# Patient Record
Sex: MALE | Race: WHITE | Employment: OTHER | URBAN - METROPOLITAN AREA
[De-identification: names, ages, dates, MRNs, and addresses within clinical notes are randomized per-mention and may not be internally consistent; named-entity substitution may affect disease eponyms.]

---

## 2017-05-12 ENCOUNTER — HOSPITAL ENCOUNTER (OUTPATIENT)
Age: 72
Setting detail: OUTPATIENT SURGERY
Discharge: HOME | End: 2017-05-12
Attending: INTERNAL MEDICINE | Admitting: INTERNAL MEDICINE
Payer: MEDICARE

## 2017-05-12 ENCOUNTER — ANESTHESIA EVENT (OUTPATIENT)
Dept: ENDOSCOPY | Age: 72
End: 2017-05-12
Payer: MEDICARE

## 2017-05-12 ENCOUNTER — ANESTHESIA (OUTPATIENT)
Dept: ENDOSCOPY | Age: 72
End: 2017-05-12
Payer: MEDICARE

## 2017-05-12 ENCOUNTER — OFFICE VISIT (OUTPATIENT)
Dept: GASTROENTEROLOGY | Age: 72
End: 2017-05-12

## 2017-05-12 VITALS
BODY MASS INDEX: 26.05 KG/M2 | RESPIRATION RATE: 16 BRPM | SYSTOLIC BLOOD PRESSURE: 119 MMHG | WEIGHT: 182 LBS | DIASTOLIC BLOOD PRESSURE: 59 MMHG | TEMPERATURE: 97.9 F | OXYGEN SATURATION: 95 % | HEIGHT: 70 IN | HEART RATE: 79 BPM

## 2017-05-12 VITALS
SYSTOLIC BLOOD PRESSURE: 147 MMHG | OXYGEN SATURATION: 85 % | DIASTOLIC BLOOD PRESSURE: 74 MMHG | RESPIRATION RATE: 27 BRPM

## 2017-05-12 DIAGNOSIS — Z79.899 ENCOUNTER FOR LONG-TERM (CURRENT) USE OF MEDICATIONS: ICD-10-CM

## 2017-05-12 DIAGNOSIS — Z86.010 PERSONAL HISTORY OF COLONIC POLYPS: Primary | ICD-10-CM

## 2017-05-12 DIAGNOSIS — Z79.82 ENCOUNTER FOR LONG-TERM (CURRENT) USE OF ASPIRIN: ICD-10-CM

## 2017-05-12 PROCEDURE — 1036F TOBACCO NON-USER: CPT | Performed by: INTERNAL MEDICINE

## 2017-05-12 PROCEDURE — PREOPEXAM PRE-OP EXAM: Performed by: INTERNAL MEDICINE

## 2017-05-12 PROCEDURE — 1123F ACP DISCUSS/DSCN MKR DOCD: CPT | Performed by: INTERNAL MEDICINE

## 2017-05-12 PROCEDURE — G8420 CALC BMI NORM PARAMETERS: HCPCS | Performed by: INTERNAL MEDICINE

## 2017-05-12 PROCEDURE — 2580000003 HC RX 258: Performed by: STUDENT IN AN ORGANIZED HEALTH CARE EDUCATION/TRAINING PROGRAM

## 2017-05-12 PROCEDURE — 3017F COLORECTAL CA SCREEN DOC REV: CPT | Performed by: INTERNAL MEDICINE

## 2017-05-12 PROCEDURE — 6360000002 HC RX W HCPCS: Performed by: NURSE ANESTHETIST, CERTIFIED REGISTERED

## 2017-05-12 PROCEDURE — 3700000001 HC ADD 15 MINUTES (ANESTHESIA): Performed by: INTERNAL MEDICINE

## 2017-05-12 PROCEDURE — G8428 CUR MEDS NOT DOCUMENT: HCPCS | Performed by: INTERNAL MEDICINE

## 2017-05-12 PROCEDURE — 3609027000 HC COLONOSCOPY: Performed by: INTERNAL MEDICINE

## 2017-05-12 PROCEDURE — 3700000000 HC ANESTHESIA ATTENDED CARE: Performed by: INTERNAL MEDICINE

## 2017-05-12 PROCEDURE — G8598 ASA/ANTIPLAT THER USED: HCPCS | Performed by: INTERNAL MEDICINE

## 2017-05-12 PROCEDURE — 88305 TISSUE EXAM BY PATHOLOGIST: CPT

## 2017-05-12 PROCEDURE — 2500000003 HC RX 250 WO HCPCS: Performed by: NURSE ANESTHETIST, CERTIFIED REGISTERED

## 2017-05-12 PROCEDURE — 4040F PNEUMOC VAC/ADMIN/RCVD: CPT | Performed by: INTERNAL MEDICINE

## 2017-05-12 PROCEDURE — 7100000010 HC PHASE II RECOVERY - FIRST 15 MIN: Performed by: INTERNAL MEDICINE

## 2017-05-12 RX ORDER — PROPOFOL 10 MG/ML
INJECTION, EMULSION INTRAVENOUS PRN
Status: DISCONTINUED | OUTPATIENT
Start: 2017-05-12 | End: 2017-05-12 | Stop reason: SDUPTHER

## 2017-05-12 RX ORDER — SODIUM CHLORIDE 9 MG/ML
INJECTION, SOLUTION INTRAVENOUS CONTINUOUS
Status: DISCONTINUED | OUTPATIENT
Start: 2017-05-12 | End: 2017-05-12 | Stop reason: HOSPADM

## 2017-05-12 RX ORDER — ONDANSETRON 2 MG/ML
4 INJECTION INTRAMUSCULAR; INTRAVENOUS
Status: DISCONTINUED | OUTPATIENT
Start: 2017-05-12 | End: 2017-05-12 | Stop reason: HOSPADM

## 2017-05-12 RX ORDER — LIDOCAINE HYDROCHLORIDE 20 MG/ML
INJECTION, SOLUTION EPIDURAL; INFILTRATION; INTRACAUDAL; PERINEURAL PRN
Status: DISCONTINUED | OUTPATIENT
Start: 2017-05-12 | End: 2017-05-12 | Stop reason: SDUPTHER

## 2017-05-12 RX ADMIN — SODIUM CHLORIDE: 9 INJECTION, SOLUTION INTRAVENOUS at 09:20

## 2017-05-12 RX ADMIN — PROPOFOL 20 MG: 10 INJECTION, EMULSION INTRAVENOUS at 09:44

## 2017-05-12 RX ADMIN — PROPOFOL 40 MG: 10 INJECTION, EMULSION INTRAVENOUS at 09:41

## 2017-05-12 RX ADMIN — PROPOFOL 20 MG: 10 INJECTION, EMULSION INTRAVENOUS at 09:50

## 2017-05-12 RX ADMIN — LIDOCAINE HYDROCHLORIDE 20 MG: 20 INJECTION, SOLUTION EPIDURAL; INFILTRATION; INTRACAUDAL; PERINEURAL at 09:39

## 2017-05-12 RX ADMIN — PROPOFOL 20 MG: 10 INJECTION, EMULSION INTRAVENOUS at 09:48

## 2017-05-12 RX ADMIN — PROPOFOL 20 MG: 10 INJECTION, EMULSION INTRAVENOUS at 09:43

## 2017-05-12 RX ADMIN — PROPOFOL 20 MG: 10 INJECTION, EMULSION INTRAVENOUS at 09:46

## 2017-05-12 RX ADMIN — PROPOFOL 80 MG: 10 INJECTION, EMULSION INTRAVENOUS at 09:39

## 2017-05-12 ASSESSMENT — PAIN - FUNCTIONAL ASSESSMENT: PAIN_FUNCTIONAL_ASSESSMENT: 0-10

## 2017-05-16 VITALS
WEIGHT: 182 LBS | SYSTOLIC BLOOD PRESSURE: 150 MMHG | HEART RATE: 78 BPM | BODY MASS INDEX: 26.49 KG/M2 | DIASTOLIC BLOOD PRESSURE: 85 MMHG

## 2017-09-27 ENCOUNTER — OFFICE VISIT (OUTPATIENT)
Dept: FAMILY MEDICINE CLINIC | Age: 72
End: 2017-09-27

## 2017-09-27 VITALS
RESPIRATION RATE: 16 BRPM | HEIGHT: 69 IN | SYSTOLIC BLOOD PRESSURE: 136 MMHG | HEART RATE: 79 BPM | DIASTOLIC BLOOD PRESSURE: 84 MMHG | WEIGHT: 189.5 LBS | OXYGEN SATURATION: 98 % | BODY MASS INDEX: 28.07 KG/M2 | TEMPERATURE: 98.2 F

## 2017-09-27 DIAGNOSIS — I10 ESSENTIAL HYPERTENSION: ICD-10-CM

## 2017-09-27 DIAGNOSIS — E78.2 MIXED HYPERLIPIDEMIA: Primary | ICD-10-CM

## 2017-09-27 PROCEDURE — G8427 DOCREV CUR MEDS BY ELIG CLIN: HCPCS | Performed by: FAMILY MEDICINE

## 2017-09-27 PROCEDURE — 4040F PNEUMOC VAC/ADMIN/RCVD: CPT | Performed by: FAMILY MEDICINE

## 2017-09-27 PROCEDURE — 1036F TOBACCO NON-USER: CPT | Performed by: FAMILY MEDICINE

## 2017-09-27 PROCEDURE — 1123F ACP DISCUSS/DSCN MKR DOCD: CPT | Performed by: FAMILY MEDICINE

## 2017-09-27 PROCEDURE — G8419 CALC BMI OUT NRM PARAM NOF/U: HCPCS | Performed by: FAMILY MEDICINE

## 2017-09-27 PROCEDURE — 3017F COLORECTAL CA SCREEN DOC REV: CPT | Performed by: FAMILY MEDICINE

## 2017-09-27 PROCEDURE — 99213 OFFICE O/P EST LOW 20 MIN: CPT | Performed by: FAMILY MEDICINE

## 2017-09-27 PROCEDURE — G8598 ASA/ANTIPLAT THER USED: HCPCS | Performed by: FAMILY MEDICINE

## 2017-09-27 ASSESSMENT — ENCOUNTER SYMPTOMS: SHORTNESS OF BREATH: 0

## 2018-08-29 ENCOUNTER — TELEPHONE (OUTPATIENT)
Dept: ENDOCRINOLOGY | Age: 73
End: 2018-08-29

## 2018-09-07 ENCOUNTER — OFFICE VISIT (OUTPATIENT)
Dept: FAMILY MEDICINE CLINIC | Age: 73
End: 2018-09-07
Payer: MEDICARE

## 2018-09-07 VITALS
HEART RATE: 76 BPM | SYSTOLIC BLOOD PRESSURE: 134 MMHG | OXYGEN SATURATION: 98 % | HEIGHT: 69 IN | TEMPERATURE: 97.2 F | WEIGHT: 200.7 LBS | RESPIRATION RATE: 14 BRPM | BODY MASS INDEX: 29.73 KG/M2 | DIASTOLIC BLOOD PRESSURE: 88 MMHG

## 2018-09-07 DIAGNOSIS — Z00.00 ROUTINE GENERAL MEDICAL EXAMINATION AT A HEALTH CARE FACILITY: Primary | ICD-10-CM

## 2018-09-07 DIAGNOSIS — N52.8 OTHER MALE ERECTILE DYSFUNCTION: ICD-10-CM

## 2018-09-07 DIAGNOSIS — I10 ESSENTIAL HYPERTENSION: ICD-10-CM

## 2018-09-07 DIAGNOSIS — E78.2 MIXED HYPERLIPIDEMIA: ICD-10-CM

## 2018-09-07 PROCEDURE — G8598 ASA/ANTIPLAT THER USED: HCPCS | Performed by: FAMILY MEDICINE

## 2018-09-07 PROCEDURE — 4040F PNEUMOC VAC/ADMIN/RCVD: CPT | Performed by: FAMILY MEDICINE

## 2018-09-07 PROCEDURE — G0438 PPPS, INITIAL VISIT: HCPCS | Performed by: FAMILY MEDICINE

## 2018-09-07 ASSESSMENT — ANXIETY QUESTIONNAIRES: GAD7 TOTAL SCORE: 0

## 2018-09-07 ASSESSMENT — PATIENT HEALTH QUESTIONNAIRE - PHQ9
SUM OF ALL RESPONSES TO PHQ QUESTIONS 1-9: 0
SUM OF ALL RESPONSES TO PHQ QUESTIONS 1-9: 0

## 2018-09-07 ASSESSMENT — LIFESTYLE VARIABLES: HOW OFTEN DO YOU HAVE A DRINK CONTAINING ALCOHOL: 0

## 2018-09-07 NOTE — PROGRESS NOTES
Medicare Annual Wellness Visit  Name: Vilma Landa Date: 2018   MRN: [id number] Sex: Male   Age: 67 y.o. Ethnicity: Non-/Non    : 1945 Race: White      Albert Carr is here for Annual Exam (Medicare Annual Well Visit ) and Discuss Medications (pt is on Diovan HCT through his heart doctor )    Screenings for behavioral, psychosocial and functional/safety risks, and cognitive dysfunction are all negative except as indicated below. These results, as well as other patient data from the [address] form, are documented in Flowsheets linked to this Encounter. No Known Allergies  Prior to Visit Medications    Medication Sig Taking? Authorizing Provider   sildenafil (VIAGRA) 100 MG tablet Take 1 tablet by mouth as needed for Erectile Dysfunction Yes Sathish Hays MD   valsartan-hydrochlorothiazide (DIOVAN-HCT) 320-25 MG per tablet Take 1 tab po QD Yes Historical Provider, MD   atorvastatin (LIPITOR) 40 MG tablet Take 40 mg by mouth daily. Yes Historical Provider, MD   metoprolol (LOPRESSOR) 25 MG tablet Take 25 mg by mouth 2 times daily. Yes Historical Provider, MD   NITROSTAT 0.4 MG SL tablet Place 0.4 mg under the tongue every 5 minutes as needed. Yes Historical Provider, MD   aspirin 81 MG tablet Take 81 mg by mouth daily.  Yes Historical Provider, MD     Past Medical History:   Diagnosis Date    BPH (benign prostatic hypertrophy)     Erectile dysfunction     Heart palpitations     History of heart attack     Hx of blood clots     Hyperlipidemia     Hypertension     PSA elevation     TOTAL-6.6, FREE 1.9, PERCENT FREE 29     Past Surgical History:   Procedure Laterality Date    CARDIAC CATHETERIZATION  2013    stents x2    COLONOSCOPY  , RECTAL BLEED    HIATAL HERNIA,ESOPHAGEAL RING,GASTRITIS    COLONOSCOPY      NORMAL    [place] COLON CA SCRN NOT  [address] IND N/A 2017    COL[facility] at [facility]    TOOTH EXTRACTION      TRANSTHORACIC ECHOCARDIOGRAM  6/7/2013    UPPER GASTROINTESTINAL ENDOSCOPY  2001    hiatal hernia     Family History   Problem Relation Age of Onset    Heart Disease Mother     Heart Disease Sister        CareTeam (Including outside providers/suppliers regularly involved in providing care):   Patient Care Team:  Lindsey Coughlin MD as PCP - General (Family Medicine)  Lindsey Coughlin MD as PCP - [facility] Attributed Provider    Wt Readings from Last 3 Encounters:   09/07/18 200 lb 11.2 oz (91 kg)   09/27/17 189 lb 8 oz (86 kg)   05/12/17 182 lb (82.6 kg)     Vitals:    09/07/18 1120   BP: 134/88   Pulse: 76   Resp: 14   Temp: 97.2 °F (36.2 °C)   SpO2: 98%   Weight: 200 lb 11.2 oz (91 kg)   Height: 5' 9\" (1.753 m)     Body mass index is 29.64 kg/m². General Appearance: well-developed and well nourished, in no acute distress and alert  Skin: warm and dry, no rash or erythema  Head: normocephalic and atraumatic  Eyes: pupils equal, round, and reactive to light, extraocular eye movements intact, conjunctivae normal  ENT: tympanic membrane, external ear and ear canal normal bilaterally, oropharynx clear and moist with normal mucous membranes  Neck: neck supple and non tender without mass, no thyromegaly or thyroid nodules, no cervical lymphadenopathy   Pulmonary/Chest: clear to auscultation bilaterally- no wheezes, rales or rhonchi, normal air movement, no respiratory distress  Cardiovascular: normal rate, normal S1 and S2, no gallops, intact distal pulses and no carotid bruits  Abdomen: soft, non-tender, non-distended, normal bowel sounds, no masses or organomegaly  Extremities: no cyanosis, clubbing or edema  Neurologic: speech normal  Declined anna  Patient's complete Health Risk Assessment and screening values have been reviewed and are found in Flowsheets.  The following problems were reviewed today and where indicated follow up appointments were made and/or referrals ordered. Positive Risk Factor Screenings with Interventions:     Safety:  Safety  Do you have working smoke detectors?: Yes  Have all throw rugs been removed or fastened?: (!) No  Do you have non-slip mats in all bathtubs?: (!) No  Do all of your stairways have a railing or banister?: Yes  Are your doorways, halls and stairs free of clutter?: Yes  Do you always fasten your seatbelt when you are in a car?: Yes  Safety Interventions:  · Patient declines any further evaluation/treatment for this issue    Personalized Preventive Plan   Current Health Maintenance Status  Immunization History   Administered Date(s) Administered    Influenza Whole 10/01/2008    Pneumococcal 13-valent Conjugate ([id number]) 12/08/2016    Pneumococcal Conjugate 7-valent 10/01/2000    Pneumococcal Polysaccharide ([id number]) 10/10/2011    Tetanus 10/04/2006      Health Maintenance   Topic Date Due    AAA screen  1945    Hepatitis C screen  1945    Shingles Vaccine (1 of 2 - 2 Dose Series) 10/13/1995    DTaP/Tdap/Td vaccine (1 - Tdap) 10/05/2006    Potassium monitoring  05/29/2014    Creatinine monitoring  05/29/2014    Flu vaccine (1) 09/01/2018    Lipid screen  11/13/2018    Colon cancer screen colonoscopy  05/12/2022    Pneumococcal low/med risk  Completed     Recommendations for Preventive Services Due: see orders and patient instructions/AVS.  .   Recommended screening schedule for the next 5-10 years is provided to the patient in written form: see Patient Instructions/AVS.          Continue current medications   F/u with specialities as already planned f/u in a year or pr

## 2018-09-07 NOTE — PATIENT INSTRUCTIONS
Personalized Preventive Plan for Yolanda Vale - 9/7/2018  Medicare offers a range of preventive health benefits. Some of the tests and screenings are paid in full while other may be subject to a deductible, co-insurance, and/or copay. Some of these benefits include a comprehensive review of your medical history including lifestyle, illnesses that may run in your family, and various assessments and screenings as appropriate. After reviewing your medical record and screening and assessments performed today your provider may have ordered immunizations, labs, imaging, and/or referrals for you. A list of these orders (if applicable) as well as your Preventive Care list are included within your After Visit Summary for your review. Other Preventive Recommendations:    · A preventive eye exam performed by an eye specialist is recommended every 1-2 years to screen for glaucoma; cataracts, macular degeneration, and other eye disorders. · A preventive dental visit is recommended every 6 months. · Try to get at least 150 minutes of exercise per week or 10,000 steps per day on a pedometer . · Order or download the FREE \"Exercise & Physical Activity: Your Everyday Guide\" from [employer]. Call 9-112.309.2507 or search [employer] online. · You need 9781-6863 mg of calcium and 2790-2788 IU of vitamin D per day. It is possible to meet your calcium requirement with diet alone, but a vitamin D supplement is usually necessary to meet this goal.  · When exposed to the sun, use a sunscreen that protects against both UVA and UVB radiation with an SPF of 30 or greater. Reapply every 2 to 3 hours or after sweating, drying off with a towel, or swimming. · Always wear a seat belt when traveling in a car. Always wear a helmet when riding a bicycle or motorcycle.

## 2019-05-31 ENCOUNTER — OFFICE VISIT (OUTPATIENT)
Dept: FAMILY MEDICINE CLINIC | Age: 74
End: 2019-05-31
Payer: MEDICARE

## 2019-05-31 VITALS
BODY MASS INDEX: 28.32 KG/M2 | SYSTOLIC BLOOD PRESSURE: 132 MMHG | WEIGHT: 197.8 LBS | HEIGHT: 70 IN | DIASTOLIC BLOOD PRESSURE: 64 MMHG | TEMPERATURE: 97.3 F | HEART RATE: 75 BPM | OXYGEN SATURATION: 98 % | RESPIRATION RATE: 10 BRPM

## 2019-05-31 DIAGNOSIS — R73.9 HYPERGLYCEMIA: Primary | ICD-10-CM

## 2019-05-31 DIAGNOSIS — B37.42 CANDIDAL BALANITIS: ICD-10-CM

## 2019-05-31 PROCEDURE — 99213 OFFICE O/P EST LOW 20 MIN: CPT | Performed by: FAMILY MEDICINE

## 2019-05-31 PROCEDURE — 82948 REAGENT STRIP/BLOOD GLUCOSE: CPT | Performed by: FAMILY MEDICINE

## 2019-05-31 PROCEDURE — 3017F COLORECTAL CA SCREEN DOC REV: CPT | Performed by: FAMILY MEDICINE

## 2019-05-31 PROCEDURE — 1123F ACP DISCUSS/DSCN MKR DOCD: CPT | Performed by: FAMILY MEDICINE

## 2019-05-31 PROCEDURE — G8419 CALC BMI OUT NRM PARAM NOF/U: HCPCS | Performed by: FAMILY MEDICINE

## 2019-05-31 PROCEDURE — G8427 DOCREV CUR MEDS BY ELIG CLIN: HCPCS | Performed by: FAMILY MEDICINE

## 2019-05-31 PROCEDURE — 4040F PNEUMOC VAC/ADMIN/RCVD: CPT | Performed by: FAMILY MEDICINE

## 2019-05-31 PROCEDURE — G8598 ASA/ANTIPLAT THER USED: HCPCS | Performed by: FAMILY MEDICINE

## 2019-05-31 PROCEDURE — 1036F TOBACCO NON-USER: CPT | Performed by: FAMILY MEDICINE

## 2019-05-31 RX ORDER — METOPROLOL TARTRATE 50 MG/1
TABLET, FILM COATED ORAL
COMMUNITY
Start: 2019-04-24

## 2019-05-31 RX ORDER — KETOCONAZOLE 20 MG/G
CREAM TOPICAL
Qty: 30 G | Refills: 1 | Status: SHIPPED | OUTPATIENT
Start: 2019-05-31

## 2019-05-31 ASSESSMENT — PATIENT HEALTH QUESTIONNAIRE - PHQ9
SUM OF ALL RESPONSES TO PHQ QUESTIONS 1-9: 0
SUM OF ALL RESPONSES TO PHQ9 QUESTIONS 1 & 2: 0
1. LITTLE INTEREST OR PLEASURE IN DOING THINGS: 0
SUM OF ALL RESPONSES TO PHQ QUESTIONS 1-9: 0
2. FEELING DOWN, DEPRESSED OR HOPELESS: 0

## 2019-05-31 ASSESSMENT — ENCOUNTER SYMPTOMS
COLOR CHANGE: 1
ABDOMINAL PAIN: 0

## 2019-05-31 NOTE — PROGRESS NOTES
Subjective:      Patient ID: Keyana Turner is a 68 y.o. male    HPI  Here with few days of intermittent swelling and cracking of skin along penis. No penile discharge. No fever. No urinary flow changes recently    Review of Systems   Constitutional: Negative for chills. Gastrointestinal: Negative for abdominal pain. Genitourinary: Negative for difficulty urinating. Skin: Positive for color change. Neurological: Negative for weakness.      Reviewed allergy, medical, social, surgical, family and med list changes and updated   Files     Social History     Socioeconomic History    Marital status:      Spouse name: None    Number of children: None    Years of education: None    Highest education level: None   Occupational History    None   Social Needs    Financial resource strain: None    Food insecurity:     Worry: None     Inability: None    Transportation needs:     Medical: None     Non-medical: None   Tobacco Use    Smoking status: Former Smoker     Packs/day: 1.50     Years: 11.00     Pack years: 16.50     Last attempt to quit: 1976     Years since quittin.0    Smokeless tobacco: Never Used   Substance and Sexual Activity    Alcohol use: No    Drug use: No    Sexual activity: Yes     Partners: Female   Lifestyle    Physical activity:     Days per week: None     Minutes per session: None    Stress: None   Relationships    Social connections:     Talks on phone: None     Gets together: None     Attends Congregation service: None     Active member of club or organization: None     Attends meetings of clubs or organizations: None     Relationship status: None    Intimate partner violence:     Fear of current or ex partner: None     Emotionally abused: None     Physically abused: None     Forced sexual activity: None   Other Topics Concern    None   Social History Narrative    None     Current Outpatient Medications   Medication Sig Dispense Refill    metoprolol tartrate (LOPRESSOR) 50 MG tablet       sildenafil (VIAGRA) 100 MG tablet Take 1 tablet by mouth as needed for Erectile Dysfunction 20 tablet 5    valsartan-hydrochlorothiazide (DIOVAN-HCT) 320-25 MG per tablet Take 1 tab po QD      atorvastatin (LIPITOR) 40 MG tablet Take 40 mg by mouth daily.  NITROSTAT 0.4 MG SL tablet Place 0.4 mg under the tongue every 5 minutes as needed.  aspirin 81 MG tablet Take 81 mg by mouth daily. No current facility-administered medications for this visit. Family History   Problem Relation Age of Onset    Heart Disease Mother     Heart Disease Sister      Past Medical History:   Diagnosis Date    BPH (benign prostatic hypertrophy)     Erectile dysfunction     Heart palpitations     History of heart attack     Hx of blood clots     Hyperlipidemia     Hypertension     PSA elevation 2011    TOTAL-6.6, FREE 1.9, PERCENT FREE 29     Objective:   /64   Pulse 75   Temp 97.3 °F (36.3 °C) (Tympanic)   Resp 10   Ht 5' 9.5\" (1.765 m)   Wt 197 lb 12.8 oz (89.7 kg)   SpO2 98%   BMI 28.79 kg/m²     Physical Exam  Genitals:Testicles down bilat, without testicular mass                  No inguinal hernia or inguinal adenopathy                   Foreskin redundant with mild distal foreskin swelling and some fissured areas along foreskin with   Amount of white exudate along glans   Lungs:  Clear and equal breath sounds without wheezes or rales  Heart:    Rate reg. 1/6 syst murmur across precordium   Gen; No acute distress  Assessment:       Diagnosis Orders   1. Candidal balanitis  Wound Culture    POC Glucose Fingerstick   2.  Hyperglycemia           Plan:      Orders Placed This Encounter   Medications    ketoconazole (NIZORAL) 2 % cream     Sig: Apply topically daily x 2 weeks     Dispense:  30 g     Refill:  1    hydrocortisone 2.5 % cream     Sig: Apply topically 2 times daily x 2 weeks     Dispense:  1 Tube     Refill:  0     Orders Placed This Encounter Procedures    Wound Culture     Standing Status:   Future     Standing Expiration Date:   5/31/2020    Lipid Panel     Standing Status:   Future     Standing Expiration Date:   5/31/2020     Order Specific Question:   Is Patient Fasting?/# of Hours     Answer:   9    Comprehensive Metabolic Panel     Standing Status:   Future     Standing Expiration Date:   5/31/2020    CBC Auto Differential     Standing Status:   Future     Standing Expiration Date:   5/31/2020    Hemoglobin A1C     Standing Status:   Future     Standing Expiration Date:   5/31/2020    POC Glucose Fingerstick   f/u after blood work

## 2019-06-02 LAB
GRAM STAIN RESULT: ABNORMAL
ORGANISM: ABNORMAL
ORGANISM: ABNORMAL
WOUND/ABSCESS: ABNORMAL

## 2019-06-04 DIAGNOSIS — R73.9 HYPERGLYCEMIA: ICD-10-CM

## 2019-06-04 LAB
ALBUMIN SERPL-MCNC: 4.2 G/DL (ref 3.5–4.6)
ALP BLD-CCNC: 100 U/L (ref 35–104)
ALT SERPL-CCNC: 16 U/L (ref 0–41)
ANION GAP SERPL CALCULATED.3IONS-SCNC: 16 MEQ/L (ref 9–15)
AST SERPL-CCNC: 22 U/L (ref 0–40)
BASOPHILS ABSOLUTE: 0.1 K/UL (ref 0–0.2)
BASOPHILS RELATIVE PERCENT: 1 %
BILIRUB SERPL-MCNC: 0.7 MG/DL (ref 0.2–0.7)
BUN BLDV-MCNC: 24 MG/DL (ref 8–23)
CALCIUM SERPL-MCNC: 9.2 MG/DL (ref 8.5–9.9)
CHLORIDE BLD-SCNC: 97 MEQ/L (ref 95–107)
CHOLESTEROL, TOTAL: 128 MG/DL (ref 0–199)
CO2: 24 MEQ/L (ref 20–31)
CREAT SERPL-MCNC: 1.55 MG/DL (ref 0.7–1.2)
EOSINOPHILS ABSOLUTE: 0.3 K/UL (ref 0–0.7)
EOSINOPHILS RELATIVE PERCENT: 3.4 %
GFR AFRICAN AMERICAN: 53.4
GFR NON-AFRICAN AMERICAN: 44.1
GLOBULIN: 3.7 G/DL (ref 2.3–3.5)
GLUCOSE BLD-MCNC: 205 MG/DL (ref 70–99)
HBA1C MFR BLD: 8.4 % (ref 4.8–5.9)
HCT VFR BLD CALC: 38.3 % (ref 42–52)
HDLC SERPL-MCNC: 26 MG/DL (ref 40–59)
HEMOGLOBIN: 13.8 G/DL (ref 14–18)
LDL CHOLESTEROL CALCULATED: 49 MG/DL (ref 0–129)
LYMPHOCYTES ABSOLUTE: 1.9 K/UL (ref 1–4.8)
LYMPHOCYTES RELATIVE PERCENT: 20.1 %
MCH RBC QN AUTO: 29.7 PG (ref 27–31.3)
MCHC RBC AUTO-ENTMCNC: 35.9 % (ref 33–37)
MCV RBC AUTO: 82.6 FL (ref 80–100)
MONOCYTES ABSOLUTE: 0.6 K/UL (ref 0.2–0.8)
MONOCYTES RELATIVE PERCENT: 6.4 %
NEUTROPHILS ABSOLUTE: 6.5 K/UL (ref 1.4–6.5)
NEUTROPHILS RELATIVE PERCENT: 69.1 %
PDW BLD-RTO: 14.2 % (ref 11.5–14.5)
PLATELET # BLD: 255 K/UL (ref 130–400)
POTASSIUM SERPL-SCNC: 3.9 MEQ/L (ref 3.4–4.9)
RBC # BLD: 4.64 M/UL (ref 4.7–6.1)
SODIUM BLD-SCNC: 137 MEQ/L (ref 135–144)
TOTAL PROTEIN: 7.9 G/DL (ref 6.3–8)
TRIGL SERPL-MCNC: 264 MG/DL (ref 0–150)
WBC # BLD: 9.5 K/UL (ref 4.8–10.8)

## 2019-06-11 ENCOUNTER — TELEPHONE (OUTPATIENT)
Dept: FAMILY MEDICINE CLINIC | Age: 74
End: 2019-06-11

## 2019-06-11 ENCOUNTER — OFFICE VISIT (OUTPATIENT)
Dept: FAMILY MEDICINE CLINIC | Age: 74
End: 2019-06-11
Payer: MEDICARE

## 2019-06-11 VITALS
BODY MASS INDEX: 29.3 KG/M2 | HEIGHT: 69 IN | DIASTOLIC BLOOD PRESSURE: 80 MMHG | TEMPERATURE: 96.9 F | SYSTOLIC BLOOD PRESSURE: 148 MMHG | WEIGHT: 197.8 LBS | OXYGEN SATURATION: 98 % | HEART RATE: 60 BPM | RESPIRATION RATE: 10 BRPM

## 2019-06-11 DIAGNOSIS — B37.42 CANDIDAL BALANITIS: Primary | ICD-10-CM

## 2019-06-11 DIAGNOSIS — R79.89 ELEVATED SERUM CREATININE: ICD-10-CM

## 2019-06-11 DIAGNOSIS — E11.9 NEW ONSET TYPE 2 DIABETES MELLITUS (HCC): ICD-10-CM

## 2019-06-11 DIAGNOSIS — E11.9 NEW ONSET TYPE 2 DIABETES MELLITUS (HCC): Primary | ICD-10-CM

## 2019-06-11 PROCEDURE — 3045F PR MOST RECENT HEMOGLOBIN A1C LEVEL 7.0-9.0%: CPT | Performed by: FAMILY MEDICINE

## 2019-06-11 PROCEDURE — 2022F DILAT RTA XM EVC RTNOPTHY: CPT | Performed by: FAMILY MEDICINE

## 2019-06-11 PROCEDURE — G8419 CALC BMI OUT NRM PARAM NOF/U: HCPCS | Performed by: FAMILY MEDICINE

## 2019-06-11 PROCEDURE — 1036F TOBACCO NON-USER: CPT | Performed by: FAMILY MEDICINE

## 2019-06-11 PROCEDURE — G8598 ASA/ANTIPLAT THER USED: HCPCS | Performed by: FAMILY MEDICINE

## 2019-06-11 PROCEDURE — 4040F PNEUMOC VAC/ADMIN/RCVD: CPT | Performed by: FAMILY MEDICINE

## 2019-06-11 PROCEDURE — 1123F ACP DISCUSS/DSCN MKR DOCD: CPT | Performed by: FAMILY MEDICINE

## 2019-06-11 PROCEDURE — 99213 OFFICE O/P EST LOW 20 MIN: CPT | Performed by: FAMILY MEDICINE

## 2019-06-11 PROCEDURE — G8427 DOCREV CUR MEDS BY ELIG CLIN: HCPCS | Performed by: FAMILY MEDICINE

## 2019-06-11 PROCEDURE — 3017F COLORECTAL CA SCREEN DOC REV: CPT | Performed by: FAMILY MEDICINE

## 2019-06-11 RX ORDER — GLUCOSAMINE HCL/CHONDROITIN SU 500-400 MG
CAPSULE ORAL
Qty: 100 STRIP | Refills: 6 | Status: SHIPPED | OUTPATIENT
Start: 2019-06-11 | End: 2019-07-19 | Stop reason: SDUPTHER

## 2019-06-11 RX ORDER — LANCETS 23 GAUGE
EACH MISCELLANEOUS
Qty: 100 EACH | Refills: 6 | Status: SHIPPED | OUTPATIENT
Start: 2019-06-11 | End: 2019-06-19 | Stop reason: ALTCHOICE

## 2019-06-11 ASSESSMENT — ENCOUNTER SYMPTOMS: COLOR CHANGE: 1

## 2019-06-11 NOTE — TELEPHONE ENCOUNTER
Pharmacy called for lancets to be sent over to [facility] in [place] as well.

## 2019-06-11 NOTE — PROGRESS NOTES
Subjective:      Patient ID: Naty Villalobos is a 68 y.o. male    HPI  Here in follow up for balanitis and hyperglycemia. Area along penis looking much better. Did not get amox scrip filled. Review of Systems   Constitutional: Negative for fever. Cardiovascular: Negative for leg swelling. Skin: Positive for color change. Neurological: Negative for numbness.      Reviewed allergy, medical, social, surgical, family and med list changes and updated   Files--reviewed blood work with slightly elevated creatinine and new onset diabetes      Social History     Socioeconomic History    Marital status:      Spouse name: None    Number of children: None    Years of education: None    Highest education level: None   Occupational History    None   Social Needs    Financial resource strain: None    Food insecurity:     Worry: None     Inability: None    Transportation needs:     Medical: None     Non-medical: None   Tobacco Use    Smoking status: Former Smoker     Packs/day: 1.50     Years: 11.00     Pack years: 16.50     Last attempt to quit: 1976     Years since quittin.1    Smokeless tobacco: Never Used   Substance and Sexual Activity    Alcohol use: No    Drug use: No    Sexual activity: Yes     Partners: Female   Lifestyle    Physical activity:     Days per week: None     Minutes per session: None    Stress: None   Relationships    Social connections:     Talks on phone: None     Gets together: None     Attends Taoism service: None     Active member of club or organization: None     Attends meetings of clubs or organizations: None     Relationship status: None    Intimate partner violence:     Fear of current or ex partner: None     Emotionally abused: None     Physically abused: None     Forced sexual activity: None   Other Topics Concern    None   Social History Narrative    None     Current Outpatient Medications   Medication Sig Dispense Refill    metoprolol tartrate (LOPRESSOR) 50 MG tablet       ketoconazole (NIZORAL) 2 % cream Apply topically daily x 2 weeks 30 g 1    hydrocortisone 2.5 % cream Apply topically 2 times daily x 2 weeks 1 Tube 0    sildenafil (VIAGRA) 100 MG tablet Take 1 tablet by mouth as needed for Erectile Dysfunction 20 tablet 5    valsartan-hydrochlorothiazide (DIOVAN-HCT) 320-25 MG per tablet Take 1 tab po QD      atorvastatin (LIPITOR) 40 MG tablet Take 40 mg by mouth daily.  NITROSTAT 0.4 MG SL tablet Place 0.4 mg under the tongue every 5 minutes as needed.  aspirin 81 MG tablet Take 81 mg by mouth daily. No current facility-administered medications for this visit. Family History   Problem Relation Age of Onset    Heart Disease Mother     Heart Disease Sister      Past Medical History:   Diagnosis Date    BPH (benign prostatic hypertrophy)     Erectile dysfunction     Heart palpitations     History of heart attack     Hx of blood clots     Hyperlipidemia     Hypertension     PSA elevation 2011    TOTAL-6.6, FREE 1.9, PERCENT FREE 29     Objective:   BP (!) 148/80   Pulse 60   Temp 96.9 °F (36.1 °C) (Tympanic)   Resp 10   Ht 5' 9\" (1.753 m)   Wt 197 lb 12.8 oz (89.7 kg)   SpO2 98%   BMI 29.21 kg/m²     Physical Exam    Genitals:Testicles down bilat, without testicular mass                  No inguinal hernia or inguinal adenopathy                   Foreskin and glans without edema with minimal erythema along penile meatus only  Gen; No acute distress    Dorsalis pedis and posterior tibial pulses are symmetric. No fissures between the toes. No open sores on the feet. Tactile sensation intact   Assessment:       Diagnosis Orders   1. Candidal balanitis     2.  New onset type 2 diabetes mellitus (HCC)     3. Elevated serum creatinine           Plan:        Orders Placed This Encounter   Medications    SITagliptin (JANUVIA) 50 MG tablet     Sig: Take 1 tablet by mouth daily     Dispense:  30 tablet

## 2019-06-11 NOTE — PROGRESS NOTES
Subjective:      Patient ID: Johnny Urban is a 68 y.o. male. HPI  Treatment Adherence:   Medication compliance:  {Desc; compliance:5303::\"compliant most of the time\"}  Diet compliance:  {Desc; compliance:5303::\"compliant most of the time\"}  Weight trend: {INCREASING/DECREASING/STABLE:35150}  Current exercise: {EXERCISE WEI}  Barriers: {Barriers to success:05197}    Hypertension:  Home blood pressure monitoring: {NO/YES:5913665496::\"No\"}. He {is/is not:9024} adherent to a low sodium diet. Patient {denies/complains:61288} {Symptoms of Hypertension, Denies:01821}. Antihypertensive medication side effects: {Hypertension med side effects:5728::\"no medication side effects noted\"}. Use of agents associated with hypertension: {bp agents assoc with hypertension:511::\"none\"}. Sodium (mEq/L)   Date Value   2019 137    BUN (mg/dL)   Date Value   2019 24 (H)    Glucose (mg/dL)   Date Value   2019 205 (H)   2011 95      Potassium (mEq/L)   Date Value   2019 3.9    CREATININE (mg/dL)   Date Value   2019 1.55 (H)         Hyperlipidemia:  No new myalgias or GI upset on {RP HYPERLIPIDEMIA MEDS:58201}.      Lab Results   Component Value Date    CHOL 128 2019    TRIG 264 (H) 2019    HDL 26 (L) 2019    LDLCALC 49 2019     Lab Results   Component Value Date    ALT 16 2019    AST 22 2019        Review of Systems    Objective:   Physical Exam    Assessment:      ***      Plan:      ***        May Amaro MA

## 2019-06-19 ENCOUNTER — TELEPHONE (OUTPATIENT)
Dept: FAMILY MEDICINE CLINIC | Age: 74
End: 2019-06-19

## 2019-06-19 RX ORDER — LANCETS 30 GAUGE
EACH MISCELLANEOUS
Qty: 100 EACH | Refills: 1 | Status: SHIPPED | OUTPATIENT
Start: 2019-06-19 | End: 2019-06-27 | Stop reason: SDUPTHER

## 2019-06-19 NOTE — TELEPHONE ENCOUNTER
I have the fax and I am the one that faxed it back indicating this yesterday so is not scanned in yet. And YES his name was on it or I would not have faxed it back and how would they know who was it on to call about it if his name was not on it! Lancets sent!

## 2019-06-19 NOTE — TELEPHONE ENCOUNTER
Pharmacy called asking if the 30 or 33 zhang lancets can be filled instead of 28 zhang. Pharmacist stated that they do not come in a 28 zhang. She also mentioned that they faxed over a request and it was faxed back stating that either was fine, but no name was on paper. I do not see that fax in media, so I could not verify. Please advise.

## 2019-06-24 DIAGNOSIS — E11.9 NEW ONSET TYPE 2 DIABETES MELLITUS (HCC): ICD-10-CM

## 2019-06-24 DIAGNOSIS — R79.89 ELEVATED SERUM CREATININE: ICD-10-CM

## 2019-06-24 LAB
ANION GAP SERPL CALCULATED.3IONS-SCNC: 16 MEQ/L (ref 9–15)
BUN BLDV-MCNC: 29 MG/DL (ref 8–23)
CALCIUM SERPL-MCNC: 9.7 MG/DL (ref 8.5–9.9)
CHLORIDE BLD-SCNC: 102 MEQ/L (ref 95–107)
CO2: 21 MEQ/L (ref 20–31)
CREAT SERPL-MCNC: 1.66 MG/DL (ref 0.7–1.2)
GFR AFRICAN AMERICAN: 49.3
GFR NON-AFRICAN AMERICAN: 40.7
GLUCOSE BLD-MCNC: 123 MG/DL (ref 70–99)
POTASSIUM SERPL-SCNC: 3.8 MEQ/L (ref 3.4–4.9)
SODIUM BLD-SCNC: 139 MEQ/L (ref 135–144)

## 2019-06-27 ENCOUNTER — OFFICE VISIT (OUTPATIENT)
Dept: FAMILY MEDICINE CLINIC | Age: 74
End: 2019-06-27
Payer: MEDICARE

## 2019-06-27 VITALS
RESPIRATION RATE: 10 BRPM | WEIGHT: 193.2 LBS | HEART RATE: 53 BPM | TEMPERATURE: 96.6 F | HEIGHT: 69 IN | BODY MASS INDEX: 28.61 KG/M2 | OXYGEN SATURATION: 99 % | DIASTOLIC BLOOD PRESSURE: 70 MMHG | SYSTOLIC BLOOD PRESSURE: 132 MMHG

## 2019-06-27 DIAGNOSIS — E11.9 NEW ONSET TYPE 2 DIABETES MELLITUS (HCC): Primary | ICD-10-CM

## 2019-06-27 DIAGNOSIS — R79.89 ELEVATED SERUM CREATININE: ICD-10-CM

## 2019-06-27 PROCEDURE — 99213 OFFICE O/P EST LOW 20 MIN: CPT | Performed by: FAMILY MEDICINE

## 2019-06-27 PROCEDURE — 3017F COLORECTAL CA SCREEN DOC REV: CPT | Performed by: FAMILY MEDICINE

## 2019-06-27 PROCEDURE — 4040F PNEUMOC VAC/ADMIN/RCVD: CPT | Performed by: FAMILY MEDICINE

## 2019-06-27 PROCEDURE — G8598 ASA/ANTIPLAT THER USED: HCPCS | Performed by: FAMILY MEDICINE

## 2019-06-27 PROCEDURE — 3045F PR MOST RECENT HEMOGLOBIN A1C LEVEL 7.0-9.0%: CPT | Performed by: FAMILY MEDICINE

## 2019-06-27 PROCEDURE — 1036F TOBACCO NON-USER: CPT | Performed by: FAMILY MEDICINE

## 2019-06-27 PROCEDURE — 2022F DILAT RTA XM EVC RTNOPTHY: CPT | Performed by: FAMILY MEDICINE

## 2019-06-27 PROCEDURE — 1123F ACP DISCUSS/DSCN MKR DOCD: CPT | Performed by: FAMILY MEDICINE

## 2019-06-27 PROCEDURE — G8419 CALC BMI OUT NRM PARAM NOF/U: HCPCS | Performed by: FAMILY MEDICINE

## 2019-06-27 PROCEDURE — G8427 DOCREV CUR MEDS BY ELIG CLIN: HCPCS | Performed by: FAMILY MEDICINE

## 2019-06-27 RX ORDER — LANCETS 30 GAUGE
EACH MISCELLANEOUS
Qty: 100 EACH | Refills: 1 | Status: SHIPPED | OUTPATIENT
Start: 2019-06-27 | End: 2019-09-09 | Stop reason: SDUPTHER

## 2019-06-27 NOTE — PROGRESS NOTES
Subjective:      Patient ID: Michelel Bragg is a 68 y.o. male    HPI  Here in follow up for diabetes. Seems to be tolerating [place] and [place] well since last time. Fasting blood sugars over the last few days running around 140. Has lost some weight from last time. Review of Systems   Constitutional: Positive for appetite change. Skin: Negative for rash. Neurological: Negative for dizziness.      Reviewed allergy, medical, social, surgical, family and med list changes and updated   Files-reviewed blood work with rising creatinine      Social History     Socioeconomic History    Marital status:      Spouse name: None    Number of children: None    Years of education: None    Highest education level: None   Occupational History    None   Social Needs    Financial resource strain: None    Food insecurity:     Worry: None     Inability: None    Transportation needs:     Medical: None     Non-medical: None   Tobacco Use    Smoking status: Former Smoker     Packs/day: 1.50     Years: 11.00     Pack years: 16.50     Last attempt to quit: 1976     Years since quittin.1    Smokeless tobacco: Never Used   Substance and Sexual Activity    Alcohol use: No    Drug use: No    Sexual activity: Yes     Partners: Female   Lifestyle    Physical activity:     Days per week: None     Minutes per session: None    Stress: None   Relationships    Social connections:     Talks on phone: None     Gets together: None     Attends [employer] service: None     Active member of club or organization: None     Attends meetings of clubs or organizations: None     Relationship status: None    Intimate partner violence:     Fear of current or ex partner: None     Emotionally abused: None     Physically abused: None     Forced sexual activity: None   Other Topics Concern    None   Social History Narrative    None     Current Outpatient Medications   Medication Sig Dispense Refill    Lancets MISC Test 2 times a day & as needed for symptoms of irregular blood glucose. , 100 each 1    SITagliptin (JANUVIA) 50 MG tablet Take 1 tablet by mouth daily 30 tablet 0    blood glucose monitor kit and supplies Test 2 times a day & as needed for symptoms of irregular blood glucose. 1 kit 0    blood glucose monitor strips Test 2 times a day & as needed for symptoms of irregular blood glucose. 100 strip 6    metoprolol tartrate (LOPRESSOR) 50 MG tablet       ketoconazole (NIZORAL) 2 % cream Apply topically daily x 2 weeks 30 g 1    hydrocortisone 2.5 % cream Apply topically 2 times daily x 2 weeks 1 Tube 0    sildenafil (VIAGRA) 100 MG tablet Take 1 tablet by mouth as needed for Erectile Dysfunction 20 tablet 5    valsartan-hydrochlorothiazide (DIOVAN-HCT) 320-25 MG per tablet Take 1 tab po QD      atorvastatin (LIPITOR) 40 MG tablet Take 40 mg by mouth daily.  NITROSTAT 0.4 MG SL tablet Place 0.4 mg under the tongue every 5 minutes as needed.  aspirin 81 MG tablet Take 81 mg by mouth daily. No current facility-administered medications for this visit. Family History   Problem Relation Age of Onset    Heart Disease Mother     Heart Disease Sister      Past Medical History:   Diagnosis Date    BPH (benign prostatic hypertrophy)     Erectile dysfunction     Heart palpitations     History of heart attack     Hx of blood clots     Hyperlipidemia     Hypertension     PSA elevation 2011    TOTAL-6.6, FREE 1.9, PERCENT FREE 29     Objective:   /70   Pulse 53   Temp 96.6 °F (35.9 °C) (Tympanic)   Resp 10   Ht 5' 9\" (1.753 m)   Wt 193 lb 3.2 oz (87.6 kg)   SpO2 99%   BMI 28.53 kg/m²     Physical Exam  No exam done-reviewed vitals   Assessment:       Diagnosis Orders   1.  New onset type 2 diabetes mellitus (HCC)     2. Elevated serum creatinine           Plan:     Orders Placed This Encounter   Procedures    Basic Metabolic Panel     Standing Status:   Future     Standing Expiration Date:   6/27/2020

## 2019-07-12 DIAGNOSIS — R79.89 ELEVATED SERUM CREATININE: ICD-10-CM

## 2019-07-12 DIAGNOSIS — E11.9 NEW ONSET TYPE 2 DIABETES MELLITUS (HCC): ICD-10-CM

## 2019-07-19 ENCOUNTER — OFFICE VISIT (OUTPATIENT)
Dept: FAMILY MEDICINE CLINIC | Age: 74
End: 2019-07-19
Payer: MEDICARE

## 2019-07-19 VITALS
DIASTOLIC BLOOD PRESSURE: 68 MMHG | TEMPERATURE: 96.2 F | HEIGHT: 69 IN | OXYGEN SATURATION: 99 % | SYSTOLIC BLOOD PRESSURE: 118 MMHG | BODY MASS INDEX: 28.17 KG/M2 | RESPIRATION RATE: 10 BRPM | WEIGHT: 190.2 LBS | HEART RATE: 55 BPM

## 2019-07-19 DIAGNOSIS — R79.89 ELEVATED SERUM CREATININE: ICD-10-CM

## 2019-07-19 DIAGNOSIS — R79.89 ELEVATED SERUM CREATININE: Primary | ICD-10-CM

## 2019-07-19 DIAGNOSIS — E11.9 NEW ONSET TYPE 2 DIABETES MELLITUS (HCC): Primary | ICD-10-CM

## 2019-07-19 DIAGNOSIS — E11.9 NEW ONSET TYPE 2 DIABETES MELLITUS (HCC): ICD-10-CM

## 2019-07-19 LAB
ANION GAP SERPL CALCULATED.3IONS-SCNC: 13 MEQ/L (ref 9–15)
BUN BLDV-MCNC: 25 MG/DL (ref 8–23)
CALCIUM SERPL-MCNC: 9.9 MG/DL (ref 8.5–9.9)
CHLORIDE BLD-SCNC: 100 MEQ/L (ref 95–107)
CO2: 27 MEQ/L (ref 20–31)
CREAT SERPL-MCNC: 1.56 MG/DL (ref 0.7–1.2)
CREATININE URINE: 247.4 MG/DL
GFR AFRICAN AMERICAN: 52.9
GFR NON-AFRICAN AMERICAN: 43.8
GLUCOSE BLD-MCNC: 107 MG/DL (ref 70–99)
MICROALBUMIN UR-MCNC: 2.9 MG/DL
MICROALBUMIN/CREAT UR-RTO: 11.7 MG/G (ref 0–30)
POTASSIUM SERPL-SCNC: 4 MEQ/L (ref 3.4–4.9)
SODIUM BLD-SCNC: 140 MEQ/L (ref 135–144)

## 2019-07-19 PROCEDURE — 1036F TOBACCO NON-USER: CPT | Performed by: FAMILY MEDICINE

## 2019-07-19 PROCEDURE — 2022F DILAT RTA XM EVC RTNOPTHY: CPT | Performed by: FAMILY MEDICINE

## 2019-07-19 PROCEDURE — G8598 ASA/ANTIPLAT THER USED: HCPCS | Performed by: FAMILY MEDICINE

## 2019-07-19 PROCEDURE — 99213 OFFICE O/P EST LOW 20 MIN: CPT | Performed by: FAMILY MEDICINE

## 2019-07-19 PROCEDURE — 4040F PNEUMOC VAC/ADMIN/RCVD: CPT | Performed by: FAMILY MEDICINE

## 2019-07-19 PROCEDURE — 3017F COLORECTAL CA SCREEN DOC REV: CPT | Performed by: FAMILY MEDICINE

## 2019-07-19 PROCEDURE — 1123F ACP DISCUSS/DSCN MKR DOCD: CPT | Performed by: FAMILY MEDICINE

## 2019-07-19 PROCEDURE — G8419 CALC BMI OUT NRM PARAM NOF/U: HCPCS | Performed by: FAMILY MEDICINE

## 2019-07-19 PROCEDURE — 3045F PR MOST RECENT HEMOGLOBIN A1C LEVEL 7.0-9.0%: CPT | Performed by: FAMILY MEDICINE

## 2019-07-19 PROCEDURE — G8427 DOCREV CUR MEDS BY ELIG CLIN: HCPCS | Performed by: FAMILY MEDICINE

## 2019-07-19 RX ORDER — GLUCOSAMINE HCL/CHONDROITIN SU 500-400 MG
CAPSULE ORAL
Qty: 100 STRIP | Refills: 6 | Status: SHIPPED | OUTPATIENT
Start: 2019-07-19 | End: 2019-11-12 | Stop reason: SDUPTHER

## 2019-07-19 RX ORDER — BLOOD-GLUCOSE METER
EACH MISCELLANEOUS
Refills: 0 | COMMUNITY
Start: 2019-06-11

## 2019-07-19 NOTE — PROGRESS NOTES
Subjective:      Patient ID: Scott Strong is a 68 y.o. male    HPI  Here in follow up for diabetes. Fasting blood sugars running around 125 and pm sugars around 160. Has been drinking more fluids and watching diet more closely since last time. Review of Systems   Constitutional: Positive for appetite change. Skin: Negative for rash. Neurological: Negative for dizziness and weakness.      Reviewed allergy, medical, social, surgical, family and med list changes and updated   Files     Social History     Socioeconomic History    Marital status:      Spouse name: None    Number of children: None    Years of education: None    Highest education level: None   Occupational History    None   Social Needs    Financial resource strain: None    Food insecurity:     Worry: None     Inability: None    Transportation needs:     Medical: None     Non-medical: None   Tobacco Use    Smoking status: Former Smoker     Packs/day: 1.50     Years: 11.00     Pack years: 16.50     Last attempt to quit: 1976     Years since quittin.2    Smokeless tobacco: Never Used   Substance and Sexual Activity    Alcohol use: No    Drug use: No    Sexual activity: Yes     Partners: Female   Lifestyle    Physical activity:     Days per week: None     Minutes per session: None    Stress: None   Relationships    Social connections:     Talks on phone: None     Gets together: None     Attends Mandaen service: None     Active member of club or organization: None     Attends meetings of clubs or organizations: None     Relationship status: None    Intimate partner violence:     Fear of current or ex partner: None     Emotionally abused: None     Physically abused: None     Forced sexual activity: None   Other Topics Concern    None   Social History Narrative    None     Current Outpatient Medications   Medication Sig Dispense Refill    Lancets MISC Test 2 times a day & as needed for symptoms of irregular blood glucose. , 100 each 1    SITagliptin (JANUVIA) 50 MG tablet Take 1 tablet by mouth daily 30 tablet 0    blood glucose monitor kit and supplies Test 2 times a day & as needed for symptoms of irregular blood glucose. 1 kit 0    blood glucose monitor strips Test 2 times a day & as needed for symptoms of irregular blood glucose. 100 strip 6    metoprolol tartrate (LOPRESSOR) 50 MG tablet       ketoconazole (NIZORAL) 2 % cream Apply topically daily x 2 weeks 30 g 1    hydrocortisone 2.5 % cream Apply topically 2 times daily x 2 weeks 1 Tube 0    sildenafil (VIAGRA) 100 MG tablet Take 1 tablet by mouth as needed for Erectile Dysfunction 20 tablet 5    valsartan-hydrochlorothiazide (DIOVAN-HCT) 320-25 MG per tablet Take 1 tab po QD      atorvastatin (LIPITOR) 40 MG tablet Take 40 mg by mouth daily.  NITROSTAT 0.4 MG SL tablet Place 0.4 mg under the tongue every 5 minutes as needed.  aspirin 81 MG tablet Take 81 mg by mouth daily.  Blood Glucose Monitoring Suppl (ONE TOUCH ULTRA 2) w/Device KIT TEST 2 TIMES A DAY  0     No current facility-administered medications for this visit. Family History   Problem Relation Age of Onset    Heart Disease Mother     Heart Disease Sister      Past Medical History:   Diagnosis Date    BPH (benign prostatic hypertrophy)     Erectile dysfunction     Heart palpitations     History of heart attack     Hx of blood clots     Hyperlipidemia     Hypertension     PSA elevation 2011    TOTAL-6.6, FREE 1.9, PERCENT FREE 29     Objective:   /68   Pulse 55   Temp 96.2 °F (35.7 °C) (Tympanic)   Resp 10   Ht 5' 9\" (1.753 m)   Wt 190 lb 3.2 oz (86.3 kg)   SpO2 99%   BMI 28.09 kg/m²     Physical Exam  No exam done-reviewed vitals   Assessment:       Diagnosis Orders   1.  New onset type 2 diabetes mellitus (HCC)     2. Elevated serum creatinine           Plan:     Orders Placed This Encounter   Procedures    Microalbumin / Creatinine Urine Ratio

## 2019-07-22 LAB
ANION GAP SERPL CALCULATED.3IONS-SCNC: 14 MEQ/L (ref 9–15)
BUN BLDV-MCNC: 24 MG/DL (ref 8–23)
CALCIUM SERPL-MCNC: 9.8 MG/DL (ref 8.5–9.9)
CHLORIDE BLD-SCNC: 96 MEQ/L (ref 95–107)
CO2: 26 MEQ/L (ref 20–31)
CREAT SERPL-MCNC: 1.67 MG/DL (ref 0.7–1.2)
GFR AFRICAN AMERICAN: 48.9
GFR NON-AFRICAN AMERICAN: 40.4
GLUCOSE BLD-MCNC: 161 MG/DL (ref 70–99)
POTASSIUM SERPL-SCNC: 3.9 MEQ/L (ref 3.4–4.9)
SODIUM BLD-SCNC: 136 MEQ/L (ref 135–144)

## 2019-09-03 DIAGNOSIS — E11.9 NEW ONSET TYPE 2 DIABETES MELLITUS (HCC): ICD-10-CM

## 2019-09-03 LAB — HBA1C MFR BLD: 5.7 % (ref 4.8–5.9)

## 2019-09-09 ENCOUNTER — TELEPHONE (OUTPATIENT)
Dept: FAMILY MEDICINE CLINIC | Age: 74
End: 2019-09-09

## 2019-09-09 RX ORDER — LANCETS 30 GAUGE
EACH MISCELLANEOUS
Qty: 100 EACH | Refills: 1 | Status: SHIPPED | OUTPATIENT
Start: 2019-09-09 | End: 2019-09-10 | Stop reason: SDUPTHER

## 2019-09-09 NOTE — TELEPHONE ENCOUNTER
Pharmacy calling to get a new rx for lancets for patient's [employer] [facility]. Please sent to pharmacy.

## 2019-09-10 ENCOUNTER — OFFICE VISIT (OUTPATIENT)
Dept: FAMILY MEDICINE CLINIC | Age: 74
End: 2019-09-10
Payer: MEDICARE

## 2019-09-10 ENCOUNTER — TELEPHONE (OUTPATIENT)
Dept: FAMILY MEDICINE CLINIC | Age: 74
End: 2019-09-10

## 2019-09-10 VITALS
DIASTOLIC BLOOD PRESSURE: 68 MMHG | RESPIRATION RATE: 16 BRPM | SYSTOLIC BLOOD PRESSURE: 122 MMHG | OXYGEN SATURATION: 99 % | WEIGHT: 188 LBS | HEIGHT: 69 IN | HEART RATE: 58 BPM | TEMPERATURE: 96.6 F | BODY MASS INDEX: 27.85 KG/M2

## 2019-09-10 DIAGNOSIS — E11.9 NEW ONSET TYPE 2 DIABETES MELLITUS (HCC): Primary | ICD-10-CM

## 2019-09-10 PROCEDURE — 1123F ACP DISCUSS/DSCN MKR DOCD: CPT | Performed by: FAMILY MEDICINE

## 2019-09-10 PROCEDURE — 3288F FALL RISK ASSESSMENT DOCD: CPT | Performed by: FAMILY MEDICINE

## 2019-09-10 PROCEDURE — 1036F TOBACCO NON-USER: CPT | Performed by: FAMILY MEDICINE

## 2019-09-10 PROCEDURE — G8510 SCR DEP NEG, NO PLAN REQD: HCPCS | Performed by: FAMILY MEDICINE

## 2019-09-10 PROCEDURE — G8427 DOCREV CUR MEDS BY ELIG CLIN: HCPCS | Performed by: FAMILY MEDICINE

## 2019-09-10 PROCEDURE — G8419 CALC BMI OUT NRM PARAM NOF/U: HCPCS | Performed by: FAMILY MEDICINE

## 2019-09-10 PROCEDURE — 3017F COLORECTAL CA SCREEN DOC REV: CPT | Performed by: FAMILY MEDICINE

## 2019-09-10 PROCEDURE — G8598 ASA/ANTIPLAT THER USED: HCPCS | Performed by: FAMILY MEDICINE

## 2019-09-10 PROCEDURE — 3044F HG A1C LEVEL LT 7.0%: CPT | Performed by: FAMILY MEDICINE

## 2019-09-10 PROCEDURE — 99213 OFFICE O/P EST LOW 20 MIN: CPT | Performed by: FAMILY MEDICINE

## 2019-09-10 PROCEDURE — 4040F PNEUMOC VAC/ADMIN/RCVD: CPT | Performed by: FAMILY MEDICINE

## 2019-09-10 PROCEDURE — 2022F DILAT RTA XM EVC RTNOPTHY: CPT | Performed by: FAMILY MEDICINE

## 2019-09-10 RX ORDER — LANCETS 30 GAUGE
EACH MISCELLANEOUS
Qty: 300 EACH | Refills: 1 | Status: SHIPPED | OUTPATIENT
Start: 2019-09-10 | End: 2021-08-31 | Stop reason: SDUPTHER

## 2019-09-10 ASSESSMENT — ENCOUNTER SYMPTOMS: ABDOMINAL PAIN: 0

## 2019-11-01 DIAGNOSIS — E11.9 NEW ONSET TYPE 2 DIABETES MELLITUS (HCC): ICD-10-CM

## 2019-11-01 LAB — HBA1C MFR BLD: 5.2 % (ref 4.8–5.9)

## 2019-11-12 ENCOUNTER — OFFICE VISIT (OUTPATIENT)
Dept: FAMILY MEDICINE CLINIC | Age: 74
End: 2019-11-12
Payer: MEDICARE

## 2019-11-12 VITALS
HEIGHT: 69 IN | WEIGHT: 185.7 LBS | DIASTOLIC BLOOD PRESSURE: 70 MMHG | SYSTOLIC BLOOD PRESSURE: 120 MMHG | HEART RATE: 68 BPM | RESPIRATION RATE: 16 BRPM | TEMPERATURE: 97.9 F | OXYGEN SATURATION: 96 % | BODY MASS INDEX: 27.5 KG/M2

## 2019-11-12 DIAGNOSIS — E11.9 NEW ONSET TYPE 2 DIABETES MELLITUS (HCC): Primary | ICD-10-CM

## 2019-11-12 PROCEDURE — 3044F HG A1C LEVEL LT 7.0%: CPT | Performed by: FAMILY MEDICINE

## 2019-11-12 PROCEDURE — G8427 DOCREV CUR MEDS BY ELIG CLIN: HCPCS | Performed by: FAMILY MEDICINE

## 2019-11-12 PROCEDURE — 2022F DILAT RTA XM EVC RTNOPTHY: CPT | Performed by: FAMILY MEDICINE

## 2019-11-12 PROCEDURE — 1123F ACP DISCUSS/DSCN MKR DOCD: CPT | Performed by: FAMILY MEDICINE

## 2019-11-12 PROCEDURE — 99213 OFFICE O/P EST LOW 20 MIN: CPT | Performed by: FAMILY MEDICINE

## 2019-11-12 PROCEDURE — G8598 ASA/ANTIPLAT THER USED: HCPCS | Performed by: FAMILY MEDICINE

## 2019-11-12 PROCEDURE — 1036F TOBACCO NON-USER: CPT | Performed by: FAMILY MEDICINE

## 2019-11-12 PROCEDURE — 3017F COLORECTAL CA SCREEN DOC REV: CPT | Performed by: FAMILY MEDICINE

## 2019-11-12 PROCEDURE — G8484 FLU IMMUNIZE NO ADMIN: HCPCS | Performed by: FAMILY MEDICINE

## 2019-11-12 PROCEDURE — 4040F PNEUMOC VAC/ADMIN/RCVD: CPT | Performed by: FAMILY MEDICINE

## 2019-11-12 PROCEDURE — G8417 CALC BMI ABV UP PARAM F/U: HCPCS | Performed by: FAMILY MEDICINE

## 2019-11-12 RX ORDER — GLUCOSAMINE HCL/CHONDROITIN SU 500-400 MG
CAPSULE ORAL
Qty: 50 STRIP | Refills: 3 | Status: SHIPPED | OUTPATIENT
Start: 2019-11-12 | End: 2019-11-18 | Stop reason: SDUPTHER

## 2019-11-18 RX ORDER — GLUCOSAMINE HCL/CHONDROITIN SU 500-400 MG
CAPSULE ORAL
Qty: 50 STRIP | Refills: 3 | Status: SHIPPED | OUTPATIENT
Start: 2019-11-18 | End: 2020-05-18 | Stop reason: SDUPTHER

## 2020-01-17 ENCOUNTER — OFFICE VISIT (OUTPATIENT)
Dept: FAMILY MEDICINE CLINIC | Age: 75
End: 2020-01-17
Payer: MEDICARE

## 2020-01-17 VITALS
TEMPERATURE: 98.2 F | BODY MASS INDEX: 27.11 KG/M2 | RESPIRATION RATE: 16 BRPM | DIASTOLIC BLOOD PRESSURE: 68 MMHG | HEIGHT: 69 IN | HEART RATE: 75 BPM | OXYGEN SATURATION: 98 % | WEIGHT: 183 LBS | SYSTOLIC BLOOD PRESSURE: 126 MMHG

## 2020-01-17 PROCEDURE — 1036F TOBACCO NON-USER: CPT | Performed by: PHYSICIAN ASSISTANT

## 2020-01-17 PROCEDURE — G8417 CALC BMI ABV UP PARAM F/U: HCPCS | Performed by: PHYSICIAN ASSISTANT

## 2020-01-17 PROCEDURE — G8484 FLU IMMUNIZE NO ADMIN: HCPCS | Performed by: PHYSICIAN ASSISTANT

## 2020-01-17 PROCEDURE — 4040F PNEUMOC VAC/ADMIN/RCVD: CPT | Performed by: PHYSICIAN ASSISTANT

## 2020-01-17 PROCEDURE — 1123F ACP DISCUSS/DSCN MKR DOCD: CPT | Performed by: PHYSICIAN ASSISTANT

## 2020-01-17 PROCEDURE — 3017F COLORECTAL CA SCREEN DOC REV: CPT | Performed by: PHYSICIAN ASSISTANT

## 2020-01-17 PROCEDURE — 99213 OFFICE O/P EST LOW 20 MIN: CPT | Performed by: PHYSICIAN ASSISTANT

## 2020-01-17 PROCEDURE — G8427 DOCREV CUR MEDS BY ELIG CLIN: HCPCS | Performed by: PHYSICIAN ASSISTANT

## 2020-01-17 RX ORDER — AMOXICILLIN 500 MG/1
500 CAPSULE ORAL 2 TIMES DAILY
Qty: 20 CAPSULE | Refills: 0 | Status: SHIPPED | OUTPATIENT
Start: 2020-01-17 | End: 2020-01-27

## 2020-01-17 RX ORDER — GREEN TEA/HOODIA GORDONII 315-12.5MG
1 CAPSULE ORAL 2 TIMES DAILY
Qty: 60 TABLET | Refills: 0 | Status: SHIPPED | OUTPATIENT
Start: 2020-01-17 | End: 2020-02-16

## 2020-01-17 ASSESSMENT — ENCOUNTER SYMPTOMS
RHINORRHEA: 1
CONSTIPATION: 0
ALLERGIC/IMMUNOLOGIC NEGATIVE: 1
WHEEZING: 0
SHORTNESS OF BREATH: 0
NAUSEA: 0
SORE THROAT: 0
STRIDOR: 0
BACK PAIN: 0
TROUBLE SWALLOWING: 0
SINUS PAIN: 0
COUGH: 1
DIARRHEA: 1
EYES NEGATIVE: 1
VOMITING: 0
SINUS PRESSURE: 0
VOICE CHANGE: 0

## 2020-01-17 ASSESSMENT — PATIENT HEALTH QUESTIONNAIRE - PHQ9
SUM OF ALL RESPONSES TO PHQ QUESTIONS 1-9: 0
SUM OF ALL RESPONSES TO PHQ9 QUESTIONS 1 & 2: 0
SUM OF ALL RESPONSES TO PHQ QUESTIONS 1-9: 0
1. LITTLE INTEREST OR PLEASURE IN DOING THINGS: 0
2. FEELING DOWN, DEPRESSED OR HOPELESS: 0

## 2020-01-17 NOTE — PROGRESS NOTES
Subjective  Albertlyndsey Ashton, 76 y.o. male presents today with:  Chief Complaint   Patient presents with    Cough     Patient c/o cough and congestion x2 days. Patient states his nose is runny too    Health Maintenance     Declines         Treatment Adherence:   Medication compliance:  compliant all of the time  Diet compliance:  compliant most of the time  Weight trend: decreasing  Current exercise: no regular exercise      Diabetes Mellitus Type 2: Current symptoms/problems include none. Home blood sugar records:  fasting range: 120  Any episodes of hypoglycemia? no  Eye exam current (within one year): yes  Tobacco history: He  reports that he quit smoking about 43 years ago. He has a 16.50 pack-year smoking history. He has never used smokeless tobacco.   Daily Aspirin? Yes  Known diabetic complications: none        Lab Results   Component Value Date    LABA1C 5.2 11/01/2019    LABA1C 5.7 09/03/2019    LABA1C 8.4 (H) 06/04/2019     Lab Results   Component Value Date    LABMICR 2.90 (H) 07/19/2019    CREATININE 1.56 (H) 07/19/2019     Lab Results   Component Value Date    ALT 16 06/04/2019    AST 22 06/04/2019     Lab Results   Component Value Date    CHOL 128 06/04/2019    TRIG 264 (H) 06/04/2019    HDL 26 (L) 06/04/2019    LDLCALC 49 06/04/2019          HPI  She is here with complaint of cough productive of green-brownish mucus the past 2 days is well as nasal drainage also green for the past 2 days denies fevers chills shortness of breath  States he did have some diarrhea but none today  He does have a history of coronary artery disease PTCA and stenting  States his Januvia was discontinued in July 2019 by his PCP-doing well with dietary control of his diabetes  Review of Systems   Constitutional: Positive for fatigue. Negative for chills and fever. HENT: Positive for congestion and rhinorrhea.  Negative for ear pain, postnasal drip, sinus pressure, sinus pain, sore throat, trouble swallowing and voice Never Used   Substance and Sexual Activity    Alcohol use: No    Drug use: No    Sexual activity: Yes     Partners: Female   Lifestyle    Physical activity:     Days per week: Not on file     Minutes per session: Not on file    Stress: Not on file   Relationships    Social connections:     Talks on phone: Not on file     Gets together: Not on file     Attends Quaker service: Not on file     Active member of club or organization: Not on file     Attends meetings of clubs or organizations: Not on file     Relationship status: Not on file    Intimate partner violence:     Fear of current or ex partner: Not on file     Emotionally abused: Not on file     Physically abused: Not on file     Forced sexual activity: Not on file   Other Topics Concern    Not on file   Social History Narrative    Not on file     Family History   Problem Relation Age of Onset    Heart Disease Mother     Heart Disease Sister      No Known Allergies     Current Outpatient Medications   Medication Sig Dispense Refill    amoxicillin (AMOXIL) 500 MG capsule Take 1 capsule by mouth 2 times daily for 10 days 20 capsule 0    Lactobacillus (PROBIOTIC ACIDOPHILUS) TABS Take 1 tablet by mouth 2 times daily 60 tablet 0    blood glucose monitor strips Test once daily. NIDDM--ICD-10 E11.9 50 strip 3    Lancets MISC Test 2 times a day & as needed for symptoms of irregular blood glucose. , 300 each 1    Blood Glucose Monitoring Suppl (ONE TOUCH ULTRA 2) w/Device KIT TEST 2 TIMES A DAY  0    SITagliptin (JANUVIA) 50 MG tablet Take 1 tablet by mouth daily 90 tablet 1    blood glucose monitor kit and supplies Test 2 times a day & as needed for symptoms of irregular blood glucose.  1 kit 0    metoprolol tartrate (LOPRESSOR) 50 MG tablet       ketoconazole (NIZORAL) 2 % cream Apply topically daily x 2 weeks 30 g 1    hydrocortisone 2.5 % cream Apply topically 2 times daily x 2 weeks 1 Tube 0    sildenafil (VIAGRA) 100 MG tablet Take 1 tablet

## 2020-02-28 PROBLEM — E11.9 DIABETES MELLITUS (HCC): Status: ACTIVE | Noted: 2020-02-28

## 2020-03-02 ENCOUNTER — TELEPHONE (OUTPATIENT)
Dept: FAMILY MEDICINE CLINIC | Age: 75
End: 2020-03-02

## 2020-05-18 RX ORDER — GLUCOSAMINE HCL/CHONDROITIN SU 500-400 MG
CAPSULE ORAL
Qty: 50 STRIP | Refills: 3 | Status: SHIPPED | OUTPATIENT
Start: 2020-05-18 | End: 2020-11-25

## 2020-05-26 ENCOUNTER — VIRTUAL VISIT (OUTPATIENT)
Dept: FAMILY MEDICINE CLINIC | Age: 75
End: 2020-05-26
Payer: MEDICARE

## 2020-05-26 VITALS — BODY MASS INDEX: 28.19 KG/M2 | HEIGHT: 68 IN | WEIGHT: 186 LBS

## 2020-05-26 PROCEDURE — 4040F PNEUMOC VAC/ADMIN/RCVD: CPT | Performed by: NURSE PRACTITIONER

## 2020-05-26 PROCEDURE — G0439 PPPS, SUBSEQ VISIT: HCPCS | Performed by: NURSE PRACTITIONER

## 2020-05-26 PROCEDURE — 3017F COLORECTAL CA SCREEN DOC REV: CPT | Performed by: NURSE PRACTITIONER

## 2020-05-26 PROCEDURE — 1123F ACP DISCUSS/DSCN MKR DOCD: CPT | Performed by: NURSE PRACTITIONER

## 2020-05-26 ASSESSMENT — LIFESTYLE VARIABLES: HOW OFTEN DO YOU HAVE A DRINK CONTAINING ALCOHOL: 0

## 2020-05-26 ASSESSMENT — PATIENT HEALTH QUESTIONNAIRE - PHQ9
SUM OF ALL RESPONSES TO PHQ QUESTIONS 1-9: 0
SUM OF ALL RESPONSES TO PHQ QUESTIONS 1-9: 0

## 2020-05-26 NOTE — PROGRESS NOTES
Medicare Annual Wellness Visit  Are Name: Ga Gagnon Date: 2020   MRN: [id number] Sex: Male   Age: 76 y.o. Ethnicity: Non-/Non    : 1945 Race: Nupur Tierney is here for Medicare AWV    Screenings for behavioral, psychosocial and functional/safety risks, and cognitive dysfunction are all negative except as indicated below. These results, as well as other patient data from the [address] form, are documented in Flowsheets linked to this Encounter. No Known Allergies      Prior to Visit Medications    Medication Sig Taking? Authorizing Provider   blood glucose monitor strips Test once daily. NIDDM--ICD-10 E11.9  Danita Smith MD   Lancets MISC Test 2 times a day & as needed for symptoms of irregular blood glucose.,  Danita Smith MD   Blood Glucose Monitoring Suppl (ONE TOUCH ULTRA 2) w/Device KIT TEST 2 TIMES A DAY  Historical Provider, MD   SITagliptin (JANUVIA) 50 MG tablet Take 1 tablet by mouth daily  Danita Smith MD   blood glucose monitor kit and supplies Test 2 times a day & as needed for symptoms of irregular blood glucose. Danita Smith MD   metoprolol tartrate (LOPRESSOR) 50 MG tablet   Historical Provider, MD   ketoconazole (NIZORAL) 2 % cream Apply topically daily x 2 weeks  Danita Smith MD   hydrocortisone 2.5 % cream Apply topically 2 times daily x 2 weeks  Danita Smith MD   sildenafil (VIAGRA) 100 MG tablet Take 1 tablet by mouth as needed for Erectile Dysfunction  Danita Smith MD   valsartan-hydrochlorothiazide (DIOVAN-HCT) 320-25 MG per tablet Take 1 tab po QD  Historical Provider, MD   atorvastatin (LIPITOR) 40 MG tablet Take 40 mg by mouth daily. Historical Provider, MD   NITROSTAT 0.4 MG SL tablet Place 0.4 mg under the tongue every 5 minutes as needed. Historical Provider, MD   aspirin 81 MG tablet Take 81 mg by mouth daily.   Historical Provider, MD         Past Medical History:   Diagnosis Date

## 2020-10-15 ENCOUNTER — VIRTUAL VISIT (OUTPATIENT)
Dept: FAMILY MEDICINE CLINIC | Age: 75
End: 2020-10-15
Payer: MEDICARE

## 2020-10-15 DIAGNOSIS — E11.9 NEW ONSET TYPE 2 DIABETES MELLITUS (HCC): ICD-10-CM

## 2020-10-15 LAB
ANION GAP SERPL CALCULATED.3IONS-SCNC: 12 MEQ/L (ref 9–15)
BUN BLDV-MCNC: 20 MG/DL (ref 8–23)
CALCIUM SERPL-MCNC: 9 MG/DL (ref 8.5–9.9)
CHLORIDE BLD-SCNC: 99 MEQ/L (ref 95–107)
CO2: 26 MEQ/L (ref 20–31)
CREAT SERPL-MCNC: 1.56 MG/DL (ref 0.7–1.2)
CREATININE URINE: 166 MG/DL
GFR AFRICAN AMERICAN: 52.8
GFR NON-AFRICAN AMERICAN: 43.6
GLUCOSE BLD-MCNC: 208 MG/DL (ref 70–99)
HBA1C MFR BLD: 6.9 % (ref 4.8–5.9)
MICROALBUMIN UR-MCNC: 2.4 MG/DL
MICROALBUMIN/CREAT UR-RTO: 14.5 MG/G (ref 0–30)
POTASSIUM SERPL-SCNC: 4.1 MEQ/L (ref 3.4–4.9)
SODIUM BLD-SCNC: 137 MEQ/L (ref 135–144)

## 2020-10-15 PROCEDURE — 99442 PR PHYS/QHP TELEPHONE EVALUATION 11-20 MIN: CPT | Performed by: FAMILY MEDICINE

## 2020-10-15 ASSESSMENT — ENCOUNTER SYMPTOMS
VOMITING: 0
COUGH: 0
ABDOMINAL PAIN: 0
DIARRHEA: 0

## 2020-10-15 ASSESSMENT — PATIENT HEALTH QUESTIONNAIRE - PHQ9
2. FEELING DOWN, DEPRESSED OR HOPELESS: 0
SUM OF ALL RESPONSES TO PHQ9 QUESTIONS 1 & 2: 0
SUM OF ALL RESPONSES TO PHQ QUESTIONS 1-9: 0
1. LITTLE INTEREST OR PLEASURE IN DOING THINGS: 0
SUM OF ALL RESPONSES TO PHQ QUESTIONS 1-9: 0
SUM OF ALL RESPONSES TO PHQ QUESTIONS 1-9: 0

## 2020-10-15 NOTE — PROGRESS NOTES
activity: None   Other Topics Concern    None   Social History Narrative    None     Current Outpatient Medications   Medication Sig Dispense Refill    blood glucose monitor strips Test once daily. NIDDM--ICD-10 E11.9 50 strip 3    Lancets MISC Test 2 times a day & as needed for symptoms of irregular blood glucose. , 300 each 1    Blood Glucose Monitoring Suppl (ONE TOUCH ULTRA 2) w/Device KIT TEST 2 TIMES A DAY  0    SITagliptin (JANUVIA) 50 MG tablet Take 1 tablet by mouth daily 90 tablet 1    blood glucose monitor kit and supplies Test 2 times a day & as needed for symptoms of irregular blood glucose. 1 kit 0    metoprolol tartrate (LOPRESSOR) 50 MG tablet       ketoconazole (NIZORAL) 2 % cream Apply topically daily x 2 weeks 30 g 1    hydrocortisone 2.5 % cream Apply topically 2 times daily x 2 weeks 1 Tube 0    sildenafil (VIAGRA) 100 MG tablet Take 1 tablet by mouth as needed for Erectile Dysfunction 20 tablet 5    valsartan-hydrochlorothiazide (DIOVAN-HCT) 320-25 MG per tablet Take 1 tab po QD      atorvastatin (LIPITOR) 40 MG tablet Take 40 mg by mouth daily.  NITROSTAT 0.4 MG SL tablet Place 0.4 mg under the tongue every 5 minutes as needed.  aspirin 81 MG tablet Take 81 mg by mouth daily. No current facility-administered medications for this visit. Family History   Problem Relation Age of Onset    Heart Disease Mother     Heart Disease Sister      Past Medical History:   Diagnosis Date    BPH (benign prostatic hypertrophy)     Erectile dysfunction     Heart palpitations     History of heart attack     Hx of blood clots     Hyperlipidemia     Hypertension     PSA elevation 2011    TOTAL-6.6, FREE 1.9, PERCENT FREE 29   Albert Mcgraw is a 76 y.o. male evaluated via telephone on 10/15/2020.       Consent:  He and/or health care decision maker is aware that that he may receive a bill for this telephone service, depending on his insurance coverage, and has provided

## 2020-10-22 ENCOUNTER — VIRTUAL VISIT (OUTPATIENT)
Dept: FAMILY MEDICINE CLINIC | Age: 75
End: 2020-10-22
Payer: MEDICARE

## 2020-10-22 PROCEDURE — 99441 PR PHYS/QHP TELEPHONE EVALUATION 5-10 MIN: CPT | Performed by: FAMILY MEDICINE

## 2020-10-22 ASSESSMENT — ENCOUNTER SYMPTOMS
NAUSEA: 0
SHORTNESS OF BREATH: 0
VOMITING: 0

## 2020-10-22 NOTE — PROGRESS NOTES
Subjective:      Patient ID: Adrianna Mosley is a 76 y.o. male    HPI  Here in follow up from recent labs and diabetes    Review of Systems   Constitutional: Negative for unexpected weight change. Respiratory: Negative for shortness of breath. Cardiovascular: Negative for leg swelling. Gastrointestinal: Negative for nausea and vomiting. Reviewed allergy, medical, social, surgical, family and med list changes and updated   Files--reviewed blood work      Social History     Socioeconomic History    Marital status:      Spouse name: None    Number of children: None    Years of education: None    Highest education level: None   Occupational History    None   Social Needs    Financial resource strain: None    Food insecurity     Worry: None     Inability: None    Transportation needs     Medical: None     Non-medical: None   Tobacco Use    Smoking status: Former Smoker     Packs/day: 1.50     Years: 11.00     Pack years: 16.50     Last attempt to quit: 1976     Years since quittin.4    Smokeless tobacco: Never Used   Substance and Sexual Activity    Alcohol use: No    Drug use: No    Sexual activity: Yes     Partners: Female   Lifestyle    Physical activity     Days per week: None     Minutes per session: None    Stress: None   Relationships    Social connections     Talks on phone: None     Gets together: None     Attends Nondenominational service: None     Active member of club or organization: None     Attends meetings of clubs or organizations: None     Relationship status: None    Intimate partner violence     Fear of current or ex partner: None     Emotionally abused: None     Physically abused: None     Forced sexual activity: None   Other Topics Concern    None   Social History Narrative    None     Current Outpatient Medications   Medication Sig Dispense Refill    blood glucose monitor strips Test once daily.  NIDDM--ICD-10 E11.9 50 strip 3    Lancets MISC Test 2 times a day & as needed for symptoms of irregular blood glucose. , 300 each 1    Blood Glucose Monitoring Suppl (ONE TOUCH ULTRA 2) w/Device KIT TEST 2 TIMES A DAY  0    SITagliptin (JANUVIA) 50 MG tablet Take 1 tablet by mouth daily 90 tablet 1    blood glucose monitor kit and supplies Test 2 times a day & as needed for symptoms of irregular blood glucose. 1 kit 0    metoprolol tartrate (LOPRESSOR) 50 MG tablet       ketoconazole (NIZORAL) 2 % cream Apply topically daily x 2 weeks 30 g 1    hydrocortisone 2.5 % cream Apply topically 2 times daily x 2 weeks 1 Tube 0    sildenafil (VIAGRA) 100 MG tablet Take 1 tablet by mouth as needed for Erectile Dysfunction 20 tablet 5    valsartan-hydrochlorothiazide (DIOVAN-HCT) 320-25 MG per tablet Take 1 tab po QD      atorvastatin (LIPITOR) 40 MG tablet Take 40 mg by mouth daily.  NITROSTAT 0.4 MG SL tablet Place 0.4 mg under the tongue every 5 minutes as needed.  aspirin 81 MG tablet Take 81 mg by mouth daily. No current facility-administered medications for this visit. Family History   Problem Relation Age of Onset    Heart Disease Mother     Heart Disease Sister      Past Medical History:   Diagnosis Date    BPH (benign prostatic hypertrophy)     Erectile dysfunction     Heart palpitations     History of heart attack     Hx of blood clots     Hyperlipidemia     Hypertension     PSA elevation 2011    TOTAL-6.6, FREE 1.9, PERCENT FREE 29   Albert Granados is a 76 y.o. male evaluated via telephone on 10/22/2020.       Consent:  He and/or health care decision maker is aware that that he may receive a bill for this telephone service, depending on his insurance coverage, and has provided verbal consent to proceed: Yes  Patient accepts tele health phone visit and associated charges   Visit about 10 min    Documentation:  I communicated with the patient and/or health care decision maker about    Details of this discussion including any medical advice provided: This visit was a telephone encounter. Patient was located at their home. Provider was located at their __x_ home or        ____ office. I do not  affirm this is a Patient Initiated Episode with an Established Patient who has not had a related appointment within my department in the past 7 days or scheduled within the next 24 hours. Total Time: minutes: 5-10 minutes    Note: not billable if this call serves to triage the patient into an appointment for the relevant concern      Brook Bluford   Objective: There were no vitals taken for this visit. Physical Exam  No exam done   Assessment:       Diagnosis Orders   1.  New onset type 2 diabetes mellitus (HCC)  Hemoglobin A1C   2. Stage 3b chronic kidney disease           Plan:      Continue current tx for now as will be leaving for [place] later this month and wont return until April--increase activity and lose weight and watch diet   Will blood work done in 3 months and f/u after done as virtual visit   Orders Placed This Encounter   Procedures    Hemoglobin A1C     Standing Status:   Future     Standing Expiration Date:   10/22/2021   f/u after non fasting blood work in 3 months

## 2020-11-25 RX ORDER — BLOOD SUGAR DIAGNOSTIC
STRIP MISCELLANEOUS
Qty: 100 EACH | Refills: 1 | Status: SHIPPED | OUTPATIENT
Start: 2020-11-25 | End: 2021-06-24

## 2021-01-28 ENCOUNTER — TELEPHONE (OUTPATIENT)
Dept: FAMILY MEDICINE CLINIC | Age: 76
End: 2021-01-28

## 2021-02-02 ENCOUNTER — VIRTUAL VISIT (OUTPATIENT)
Dept: FAMILY MEDICINE CLINIC | Age: 76
End: 2021-02-02
Payer: MEDICARE

## 2021-02-02 DIAGNOSIS — E11.9 NEW ONSET TYPE 2 DIABETES MELLITUS (HCC): ICD-10-CM

## 2021-02-02 PROCEDURE — 99442 PR PHYS/QHP TELEPHONE EVALUATION 11-20 MIN: CPT | Performed by: FAMILY MEDICINE

## 2021-02-02 ASSESSMENT — ENCOUNTER SYMPTOMS
DIARRHEA: 0
VOMITING: 0

## 2021-02-02 ASSESSMENT — PATIENT HEALTH QUESTIONNAIRE - PHQ9
SUM OF ALL RESPONSES TO PHQ QUESTIONS 1-9: 0
1. LITTLE INTEREST OR PLEASURE IN DOING THINGS: 0

## 2021-02-02 NOTE — PROGRESS NOTES
Subjective:      Patient ID: Irma Turner is a 76 y.o. male    HPI  Here in follow up for diabetes. Average reading in am running around 150. No changes with weight. No changes with diet. Activity level is low recently. Stopped taking [place] and [place] end of last year as discussed. Now only diet controlled  Did not go to [place] secondary to pandemic   Review of Systems   Constitutional: Negative for chills and fever. Cardiovascular: Negative for chest pain. Gastrointestinal: Negative for diarrhea and vomiting. Irma Turner is a 76 y.o. male evaluated via telephone on 2/2/2021. Consent:  He and/or health care decision maker is aware that that he may receive a bill for this telephone service, depending on his insurance coverage, and has provided verbal consent to proceed: Yes      Documentation:  I communicated with the patient and/or health care decision maker about . Details of this discussion including any medical advice provided: This visit was a telephone encounter. Patient was located at their home. Provider was located at their __x_ home or        ____ office. I affirm this is a Patient Initiated Episode with an Established Patient who has not had a related appointment within my department in the past 7 days or scheduled within the next 24 hours.     Total Time: minutes: 11-20 minutes    Note: not billable if this call serves to triage the patient into an appointment for the relevant concern      Alberta Bedoya   Reviewed allergy, medical, social, surgical, family and med list changes and updated   Files     Social History     Socioeconomic History    Marital status:      Spouse name: Not on file    Number of children: Not on file    Years of education: Not on file    Highest education level: Not on file   Occupational History    Not on file   Social Needs    Financial resource strain: Not on file    Food insecurity     Worry: Not on file Inability: Not on file    Transportation needs     Medical: Not on file     Non-medical: Not on file   Tobacco Use    Smoking status: Former Smoker     Packs/day: 1.50     Years: 11.00     Pack years: 16.50     Quit date: 1976     Years since quittin.7    Smokeless tobacco: Never Used   Substance and Sexual Activity    Alcohol use: No    Drug use: No    Sexual activity: Yes     Partners: Female   Lifestyle    Physical activity     Days per week: Not on file     Minutes per session: Not on file    Stress: Not on file   Relationships    Social connections     Talks on phone: Not on file     Gets together: Not on file     Attends Confucianism service: Not on file     Active member of club or organization: Not on file     Attends meetings of clubs or organizations: Not on file     Relationship status: Not on file    Intimate partner violence     Fear of current or ex partner: Not on file     Emotionally abused: Not on file     Physically abused: Not on file     Forced sexual activity: Not on file   Other Topics Concern    Not on file   Social History Narrative    Not on file     Current Outpatient Medications   Medication Sig Dispense Refill    blood glucose test strips (ONETOUCH ULTRA) strip TEST ONCE DAILY. 100 each 1    Lancets MISC Test 2 times a day & as needed for symptoms of irregular blood glucose. , 300 each 1    Blood Glucose Monitoring Suppl (ONE TOUCH ULTRA 2) w/Device KIT TEST 2 TIMES A DAY  0    SITagliptin (JANUVIA) 50 MG tablet Take 1 tablet by mouth daily 90 tablet 1    blood glucose monitor kit and supplies Test 2 times a day & as needed for symptoms of irregular blood glucose.  1 kit 0    metoprolol tartrate (LOPRESSOR) 50 MG tablet       ketoconazole (NIZORAL) 2 % cream Apply topically daily x 2 weeks 30 g 1    hydrocortisone 2.5 % cream Apply topically 2 times daily x 2 weeks 1 Tube 0  sildenafil (VIAGRA) 100 MG tablet Take 1 tablet by mouth as needed for Erectile Dysfunction 20 tablet 5    valsartan-hydrochlorothiazide (DIOVAN-HCT) 320-25 MG per tablet Take 1 tab po QD      atorvastatin (LIPITOR) 40 MG tablet Take 40 mg by mouth daily.  NITROSTAT 0.4 MG SL tablet Place 0.4 mg under the tongue every 5 minutes as needed.  aspirin 81 MG tablet Take 81 mg by mouth daily. No current facility-administered medications for this visit. Family History   Problem Relation Age of Onset    Heart Disease Mother     Heart Disease Sister      Past Medical History:   Diagnosis Date    BPH (benign prostatic hypertrophy)     Erectile dysfunction     Heart palpitations     History of heart attack     Hx of blood clots     Hyperlipidemia     Hypertension     PSA elevation 2011    TOTAL-6.6, FREE 1.9, PERCENT FREE 29     Objective: There were no vitals taken for this visit. Physical Exam  No exam done   Assessment:       Diagnosis Orders   1.  New onset type 2 diabetes mellitus ([facility] [address]           Plan:    diabetes -continue current tx for now and   Get a1c level done   May need to restart [place] and [place] pending a1c-f/u dependent on above

## 2021-02-03 DIAGNOSIS — E11.9 NEW ONSET TYPE 2 DIABETES MELLITUS (HCC): ICD-10-CM

## 2021-02-03 LAB — HBA1C MFR BLD: 8 % (ref 4.8–5.9)

## 2021-04-06 ENCOUNTER — TELEPHONE (OUTPATIENT)
Dept: FAMILY MEDICINE CLINIC | Age: 76
End: 2021-04-06

## 2021-04-06 NOTE — TELEPHONE ENCOUNTER
Reshma Franklin is calling regarding the Januvia medication. Patient states that the medication is helping but not helping enough to bring his levels down to a good spot. Please advise.      Date:  1st 205 2nd 209  3rd 234  4th 224  5th 295  6th 266

## 2021-04-16 ENCOUNTER — OFFICE VISIT (OUTPATIENT)
Dept: FAMILY MEDICINE CLINIC | Age: 76
End: 2021-04-16
Payer: MEDICARE

## 2021-04-16 VITALS
SYSTOLIC BLOOD PRESSURE: 130 MMHG | TEMPERATURE: 97.1 F | WEIGHT: 192.6 LBS | DIASTOLIC BLOOD PRESSURE: 72 MMHG | HEIGHT: 68 IN | HEART RATE: 69 BPM | BODY MASS INDEX: 29.19 KG/M2 | OXYGEN SATURATION: 98 %

## 2021-04-16 DIAGNOSIS — Z12.5 SPECIAL SCREENING FOR MALIGNANT NEOPLASM OF PROSTATE: ICD-10-CM

## 2021-04-16 DIAGNOSIS — E78.2 MIXED HYPERLIPIDEMIA: ICD-10-CM

## 2021-04-16 DIAGNOSIS — E11.9 NEW ONSET TYPE 2 DIABETES MELLITUS (HCC): Primary | ICD-10-CM

## 2021-04-16 DIAGNOSIS — L98.9 SKIN LESION: ICD-10-CM

## 2021-04-16 PROCEDURE — 3017F COLORECTAL CA SCREEN DOC REV: CPT | Performed by: FAMILY MEDICINE

## 2021-04-16 PROCEDURE — 99213 OFFICE O/P EST LOW 20 MIN: CPT | Performed by: FAMILY MEDICINE

## 2021-04-16 PROCEDURE — G8427 DOCREV CUR MEDS BY ELIG CLIN: HCPCS | Performed by: FAMILY MEDICINE

## 2021-04-16 PROCEDURE — 3052F HG A1C>EQUAL 8.0%<EQUAL 9.0%: CPT | Performed by: FAMILY MEDICINE

## 2021-04-16 PROCEDURE — 1036F TOBACCO NON-USER: CPT | Performed by: FAMILY MEDICINE

## 2021-04-16 PROCEDURE — 2022F DILAT RTA XM EVC RTNOPTHY: CPT | Performed by: FAMILY MEDICINE

## 2021-04-16 PROCEDURE — 4040F PNEUMOC VAC/ADMIN/RCVD: CPT | Performed by: FAMILY MEDICINE

## 2021-04-16 PROCEDURE — G8417 CALC BMI ABV UP PARAM F/U: HCPCS | Performed by: FAMILY MEDICINE

## 2021-04-16 PROCEDURE — 1123F ACP DISCUSS/DSCN MKR DOCD: CPT | Performed by: FAMILY MEDICINE

## 2021-04-16 ASSESSMENT — ENCOUNTER SYMPTOMS
NAUSEA: 0
VOMITING: 0

## 2021-04-16 NOTE — PROGRESS NOTES
Subjective:      Patient ID: Eugenia Krabbe is a 76 y.o. male    HPI  Here in follow up for diabetes. Fasting blood sugars around 200 over the last week or so with [place] and [place]. No missed doses of medication. Has made some dietary changes over the last week or so. Weight still elevated above baseline. Will be starting to walk more as activity level has been low. Review of Systems   Constitutional: Positive for appetite change. Gastrointestinal: Negative for nausea and vomiting. Skin: Negative for rash. Neurological: Negative for weakness.      Reviewed allergy, medical, social, surgical, family and med list changes and updated   Files     Social History     Socioeconomic History    Marital status:      Spouse name: None    Number of children: None    Years of education: None    Highest education level: None   Occupational History    None   Social Needs    Financial resource strain: None    Food insecurity     Worry: None     Inability: None    Transportation needs     Medical: None     Non-medical: None   Tobacco Use    Smoking status: Former Smoker     Packs/day: 1.50     Years: 11.00     Pack years: 16.50     Quit date: 1976     Years since quittin.9    Smokeless tobacco: Never Used   Substance and Sexual Activity    Alcohol use: No    Drug use: No    Sexual activity: Yes     Partners: Female   Lifestyle    Physical activity     Days per week: None     Minutes per session: None    Stress: None   Relationships    Social connections     Talks on phone: None     Gets together: None     Attends Orthodoxy service: None     Active member of club or organization: None     Attends meetings of clubs or organizations: None     Relationship status: None    Intimate partner violence     Fear of current or ex partner: None     Emotionally abused: None     Physically abused: None     Forced sexual activity: None   Other Topics Concern    None   Social History Narrative    None Differential    Hemoglobin A1C   2. Mixed hyperlipidemia  Lipid Panel    Comprehensive Metabolic Panel    CBC Auto Differential   3. Skin lesion  RYNE Riojas MD, Dermatology, On license of [facility]   4.  Special screening for malignant neoplasm of prostate           Plan:     Orders Placed This Encounter   Procedures    Lipid Panel     Standing Status:   Future     Standing Expiration Date:   4/16/2022     Order Specific Question:   Is Patient Fasting?/# of Hours     Answer:   9    Comprehensive Metabolic Panel     Standing Status:   Future     Standing Expiration Date:   4/16/2022    CBC Auto Differential     Standing Status:   Future     Standing Expiration Date:   4/16/2022    Hemoglobin A1C     Standing Status:   Future     Standing Expiration Date:   4/16/2022    PSA Screening     Standing Status:   Future     Standing Expiration Date:   4/16/2022    RYNE Riojas MD, Dermatology, On license of [facility]     Referral Priority:   Routine     Referral Type:   Eval and Treat     Referral Reason:   Specialty Services Required     Referred to Provider:   Paul Blackwell MD     Requested Specialty:   Dermatology     Number of Visits Requested:   1   continue current meds  Patient would like to continue to work on diet and exercise   Fasting blood work in may and f/u after above

## 2021-04-16 NOTE — PROGRESS NOTES
Subjective:      Patient ID: Ingrid Flynn is a 76 y.o. male. HPI  Treatment Adherence:   Medication compliance:  {Desc; compliance:5303::\"compliant most of the time\"}  Diet compliance:  {Desc; compliance:5303::\"compliant most of the time\"}  Weight trend: {INCREASING/DECREASING/STABLE:98183}  Current exercise: {EXERCISE XXFJ:731549381}  What might prevent you from meeting your goal?: {Barriers to success:14789}  Patient plan for overcoming barriers: {COMMENT/NA:058175849}     Patient Confidence: {NUMBERS 1-10:69397}/10      Diabetes Mellitus Type 2: Current symptoms/problems include {Symptoms; diabetes:89475::\"none\"}. Home blood sugar records:  {diabetes glucometry results:10256}  Any episodes of hypoglycemia? {yes***/no:69439}  Eye exam current (within one year): {yes/no/unknown:74}  Tobacco history: He  reports that he quit smoking about 44 years ago. He has a 16.50 pack-year smoking history. He has never used smokeless tobacco.   Daily Aspirin? {yes no:473973::\"Yes\"}  Known diabetic complications: {diabetes complications:1215}    Hypertension:  Home blood pressure monitoring: {NO/YES:7434687002::\"No\"}. He {is/is not:9024} adherent to a low sodium diet. Patient {denies/complains:80209} {Symptoms of Hypertension, Denies:66388}. Antihypertensive medication side effects: {Hypertension med side effects:5728::\"no medication side effects noted\"}. Use of agents associated with hypertension: {bp agents assoc with hypertension:511::\"none\"}. Hyperlipidemia:  No new myalgias or GI upset on {RP HYPERLIPIDEMIA MEDS:59665}.        Lab Results   Component Value Date    LABA1C 8.0 (H) 02/03/2021    LABA1C 6.9 (H) 10/15/2020    LABA1C 5.2 11/01/2019     Lab Results   Component Value Date    LABMICR 2.40 (H) 10/15/2020    CREATININE 1.56 (H) 10/15/2020     Lab Results   Component Value Date    ALT 16 06/04/2019    AST 22 06/04/2019     Lab Results   Component Value Date    CHOL 128 06/04/2019    TRIG 264 (H) 06/04/2019    HDL 26 (L) 06/04/2019    LDLCALC 49 06/04/2019          Review of Systems    Objective:   Physical Exam    Assessment:      ***      Plan:      ***        GENARO OCASIO

## 2021-05-12 DIAGNOSIS — E11.9 NEW ONSET TYPE 2 DIABETES MELLITUS (HCC): ICD-10-CM

## 2021-05-12 DIAGNOSIS — E78.2 MIXED HYPERLIPIDEMIA: ICD-10-CM

## 2021-05-12 DIAGNOSIS — Z12.5 SPECIAL SCREENING FOR MALIGNANT NEOPLASM OF PROSTATE: ICD-10-CM

## 2021-05-12 LAB
ALBUMIN SERPL-MCNC: 4.2 G/DL (ref 3.5–4.6)
ALP BLD-CCNC: 103 U/L (ref 35–104)
ALT SERPL-CCNC: 26 U/L (ref 0–41)
ANION GAP SERPL CALCULATED.3IONS-SCNC: 14 MEQ/L (ref 9–15)
AST SERPL-CCNC: 30 U/L (ref 0–40)
BASOPHILS ABSOLUTE: 0.1 K/UL (ref 0–0.2)
BASOPHILS RELATIVE PERCENT: 1 %
BILIRUB SERPL-MCNC: 0.6 MG/DL (ref 0.2–0.7)
BUN BLDV-MCNC: 23 MG/DL (ref 8–23)
CALCIUM SERPL-MCNC: 9.9 MG/DL (ref 8.5–9.9)
CHLORIDE BLD-SCNC: 103 MEQ/L (ref 95–107)
CHOLESTEROL, TOTAL: 126 MG/DL (ref 0–199)
CO2: 24 MEQ/L (ref 20–31)
CREAT SERPL-MCNC: 1.61 MG/DL (ref 0.7–1.2)
EOSINOPHILS ABSOLUTE: 0.2 K/UL (ref 0–0.7)
EOSINOPHILS RELATIVE PERCENT: 2.5 %
GFR AFRICAN AMERICAN: 50.8
GFR NON-AFRICAN AMERICAN: 42
GLOBULIN: 3.5 G/DL (ref 2.3–3.5)
GLUCOSE BLD-MCNC: 157 MG/DL (ref 70–99)
HBA1C MFR BLD: 7.2 % (ref 4.8–5.9)
HCT VFR BLD CALC: 39.7 % (ref 42–52)
HDLC SERPL-MCNC: 35 MG/DL (ref 40–59)
HEMOGLOBIN: 13.5 G/DL (ref 14–18)
LDL CHOLESTEROL CALCULATED: 63 MG/DL (ref 0–129)
LYMPHOCYTES ABSOLUTE: 1.1 K/UL (ref 1–4.8)
LYMPHOCYTES RELATIVE PERCENT: 10.8 %
MCH RBC QN AUTO: 28.2 PG (ref 27–31.3)
MCHC RBC AUTO-ENTMCNC: 34 % (ref 33–37)
MCV RBC AUTO: 83 FL (ref 80–100)
MONOCYTES ABSOLUTE: 0.8 K/UL (ref 0.2–0.8)
MONOCYTES RELATIVE PERCENT: 7.5 %
NEUTROPHILS ABSOLUTE: 7.9 K/UL (ref 1.4–6.5)
NEUTROPHILS RELATIVE PERCENT: 78.2 %
PDW BLD-RTO: 14.5 % (ref 11.5–14.5)
PLATELET # BLD: 268 K/UL (ref 130–400)
POTASSIUM SERPL-SCNC: 3.8 MEQ/L (ref 3.4–4.9)
PROSTATE SPECIFIC ANTIGEN: 18.38 NG/ML (ref 0–6.22)
RBC # BLD: 4.78 M/UL (ref 4.7–6.1)
SODIUM BLD-SCNC: 141 MEQ/L (ref 135–144)
TOTAL PROTEIN: 7.7 G/DL (ref 6.3–8)
TRIGL SERPL-MCNC: 140 MG/DL (ref 0–150)
WBC # BLD: 10.1 K/UL (ref 4.8–10.8)

## 2021-05-17 ENCOUNTER — OFFICE VISIT (OUTPATIENT)
Dept: FAMILY MEDICINE CLINIC | Age: 76
End: 2021-05-17
Payer: MEDICARE

## 2021-05-17 VITALS
DIASTOLIC BLOOD PRESSURE: 82 MMHG | RESPIRATION RATE: 14 BRPM | TEMPERATURE: 97.9 F | HEART RATE: 69 BPM | OXYGEN SATURATION: 98 % | SYSTOLIC BLOOD PRESSURE: 120 MMHG | HEIGHT: 69 IN | WEIGHT: 190 LBS | BODY MASS INDEX: 28.14 KG/M2

## 2021-05-17 DIAGNOSIS — E11.9 NEW ONSET TYPE 2 DIABETES MELLITUS (HCC): Primary | ICD-10-CM

## 2021-05-17 DIAGNOSIS — R97.20 ELEVATED PSA: ICD-10-CM

## 2021-05-17 DIAGNOSIS — I10 ESSENTIAL HYPERTENSION: ICD-10-CM

## 2021-05-17 DIAGNOSIS — N18.32 STAGE 3B CHRONIC KIDNEY DISEASE (HCC): ICD-10-CM

## 2021-05-17 DIAGNOSIS — E78.2 MIXED HYPERLIPIDEMIA: ICD-10-CM

## 2021-05-17 DIAGNOSIS — D64.9 ANEMIA, UNSPECIFIED TYPE: ICD-10-CM

## 2021-05-17 PROCEDURE — 3051F HG A1C>EQUAL 7.0%<8.0%: CPT | Performed by: FAMILY MEDICINE

## 2021-05-17 PROCEDURE — 99214 OFFICE O/P EST MOD 30 MIN: CPT | Performed by: FAMILY MEDICINE

## 2021-05-17 PROCEDURE — 2022F DILAT RTA XM EVC RTNOPTHY: CPT | Performed by: FAMILY MEDICINE

## 2021-05-17 PROCEDURE — 3017F COLORECTAL CA SCREEN DOC REV: CPT | Performed by: FAMILY MEDICINE

## 2021-05-17 PROCEDURE — 1123F ACP DISCUSS/DSCN MKR DOCD: CPT | Performed by: FAMILY MEDICINE

## 2021-05-17 PROCEDURE — 4040F PNEUMOC VAC/ADMIN/RCVD: CPT | Performed by: FAMILY MEDICINE

## 2021-05-17 PROCEDURE — G8417 CALC BMI ABV UP PARAM F/U: HCPCS | Performed by: FAMILY MEDICINE

## 2021-05-17 PROCEDURE — G8427 DOCREV CUR MEDS BY ELIG CLIN: HCPCS | Performed by: FAMILY MEDICINE

## 2021-05-17 PROCEDURE — 1036F TOBACCO NON-USER: CPT | Performed by: FAMILY MEDICINE

## 2021-05-17 SDOH — ECONOMIC STABILITY: FOOD INSECURITY: WITHIN THE PAST 12 MONTHS, THE FOOD YOU BOUGHT JUST DIDN'T LAST AND YOU DIDN'T HAVE MONEY TO GET MORE.: NEVER TRUE

## 2021-05-17 ASSESSMENT — ENCOUNTER SYMPTOMS
VOMITING: 0
DIARRHEA: 0
COUGH: 0

## 2021-05-17 NOTE — PROGRESS NOTES
Transportation (Non-Medical):    Physical Activity:     Days of Exercise per Week:     Minutes of Exercise per Session:    Stress:     Feeling of Stress :    Social Connections:     Frequency of Communication with Friends and Family:     Frequency of Social Gatherings with Friends and Family:     Attends Orthodoxy Services:     Active Member of Clubs or Organizations:     Attends Club or Organization Meetings:     Marital Status:    Intimate Partner Violence:     Fear of Current or Ex-Partner:     Emotionally Abused:     Physically Abused:     Sexually Abused:      Current Outpatient Medications   Medication Sig Dispense Refill    SITagliptin (JANUVIA) 50 MG tablet Take 1 tablet by mouth daily 90 tablet 0    blood glucose test strips (ONETOUCH ULTRA) strip TEST ONCE DAILY. 100 each 1    Lancets MISC Test 2 times a day & as needed for symptoms of irregular blood glucose. , 300 each 1    Blood Glucose Monitoring Suppl (ONE TOUCH ULTRA 2) w/Device KIT TEST 2 TIMES A DAY  0    blood glucose monitor kit and supplies Test 2 times a day & as needed for symptoms of irregular blood glucose. 1 kit 0    metoprolol tartrate (LOPRESSOR) 50 MG tablet       ketoconazole (NIZORAL) 2 % cream Apply topically daily x 2 weeks 30 g 1    hydrocortisone 2.5 % cream Apply topically 2 times daily x 2 weeks 1 Tube 0    sildenafil (VIAGRA) 100 MG tablet Take 1 tablet by mouth as needed for Erectile Dysfunction 20 tablet 5    valsartan-hydrochlorothiazide (DIOVAN-HCT) 320-25 MG per tablet Take 1 tab po QD      atorvastatin (LIPITOR) 40 MG tablet Take 40 mg by mouth daily.  NITROSTAT 0.4 MG SL tablet Place 0.4 mg under the tongue every 5 minutes as needed.  aspirin 81 MG tablet Take 81 mg by mouth daily. No current facility-administered medications for this visit.      Family History   Problem Relation Age of Onset    Heart Disease Mother     Heart Disease Sister      Past Medical History:   Diagnosis Date    BPH (benign prostatic hypertrophy)     Erectile dysfunction     Heart palpitations     History of heart attack     Hx of blood clots     Hyperlipidemia     Hypertension     PSA elevation 2011    TOTAL-6.6, FREE 1.9, PERCENT FREE 29     Objective:   /82   Pulse 69   Temp 97.9 °F (36.6 °C)   Resp 14   Ht 5' 9\" (1.753 m)   Wt 190 lb (86.2 kg)   SpO2 98%   BMI 28.06 kg/m²     Physical Exam  Neck:no carotid bruits. No masses. No adenopathy. No thyroid asymmetry. Lungs:clear and equal breath sounds. No wheezes or rales. Heart:rate reg. No murmur. No gallops   Pulses:Radials 2+ equal               Poster tib 1+ equal  Extremities:no edema in either leg  Gen: In no acute distress  Abdomen; B.S present. Soft  Non tender. No hepatosplenomegaly. No masses         Dorsalis pedis and posterior tibial pulses are symmetric. No fissures between the toes. No open sores on the feet. Tactile sensation intact   Assessment:       Diagnosis Orders   1. New onset type 2 diabetes mellitus ([facility] [address]     2. Mixed hyperlipidemia     3. Essential hypertension     4. Stage 3b chronic kidney disease     5. Elevated PSA     6.  Anemia, unspecified type           Plan:    diabetes improved with tx-continue current meds -follow labs   Does have appt with urologist later this week     Orders Placed This Encounter   Procedures    Hemoglobin A1C     Standing Status:   Future     Standing Expiration Date:   5/17/2022    CBC Auto Differential     Standing Status:   Future     Standing Expiration Date:   5/17/2022    External Referral to Urology     Referral Priority:   Routine     Referral Type:   Eval and Treat     Referral Reason:   Specialty Services Required     Requested Specialty:   Urology     Number of Visits Requested:   1   non fasting blood work in 3 months and f/u after done   Continue current meds -continue to work on diet and activity   Non fasting blood work in 3 months and f/u after done

## 2021-05-17 NOTE — PROGRESS NOTES
Subjective:      Patient ID: Margarito Gomez is a 76 y.o. male. HPI    Review of Systems  Treatment Adherence:   Medication compliance:  {Desc; compliance:5303::\"compliant most of the time\"}  Diet compliance:  {Desc; compliance:5303::\"compliant most of the time\"}  Weight trend: {INCREASING/DECREASING/STABLE:47066}  Current exercise: {EXERCISE KVTP:294077568}  What might prevent you from meeting your goal?: {Barriers to success:06322}  Patient plan for overcoming barriers: {COMMENT/NA:454397666}     Patient Confidence: {NUMBERS 1-10:99442}/10      Diabetes Mellitus Type 2: Current symptoms/problems include {Symptoms; diabetes:83882::\"none\"}. Home blood sugar records:  {diabetes glucometry results:37857}  Any episodes of hypoglycemia? {yes***/no:25492}  Eye exam current (within one year): {yes/no/unknown:74}  Tobacco history: He  reports that he quit smoking about 45 years ago. He has a 16.50 pack-year smoking history. He has never used smokeless tobacco.   Daily Aspirin? {yes no:881143::\"Yes\"}  Known diabetic complications: {diabetes complications:1215}    Hypertension:  Home blood pressure monitoring: {NO/YES:1181486946::\"No\"}. He {is/is not:9024} adherent to a low sodium diet. Patient {denies/complains:60080} {Symptoms of Hypertension, Denies:60907}. Antihypertensive medication side effects: {Hypertension med side effects:5728::\"no medication side effects noted\"}. Use of agents associated with hypertension: {bp agents assoc with hypertension:511::\"none\"}. Hyperlipidemia:  No new myalgias or GI upset on {RP HYPERLIPIDEMIA MEDS:51899}.        Lab Results   Component Value Date    LABA1C 7.2 (H) 05/12/2021    LABA1C 8.0 (H) 02/03/2021    LABA1C 6.9 (H) 10/15/2020     Lab Results   Component Value Date    LABMICR 2.40 (H) 10/15/2020    CREATININE 1.61 (H) 05/12/2021     Lab Results   Component Value Date    ALT 26 05/12/2021    AST 30 05/12/2021     Lab Results   Component Value Date    CHOL 126 05/12/2021 TRIG 140 05/12/2021    HDL 35 (L) 05/12/2021    LDLCALC 63 05/12/2021        Objective:   Physical Exam    Assessment / Plan:

## 2021-06-24 RX ORDER — BLOOD SUGAR DIAGNOSTIC
STRIP MISCELLANEOUS
Qty: 100 EACH | Refills: 0 | Status: SHIPPED | OUTPATIENT
Start: 2021-06-24 | End: 2021-10-20

## 2021-07-12 NOTE — TELEPHONE ENCOUNTER
Pt calling in to check on this script stating that the pharmacy is in need of a code? Pt went to  his blood strips for One Touch Saturday and was informed that the code needs sent over before this can be filled.   ([place])        Pt also needing    Januvia 50 MG tablet     Call into [facility]      LOV:   5/17/21

## 2021-07-13 NOTE — TELEPHONE ENCOUNTER
Rx requested:  Requested Prescriptions     Pending Prescriptions Disp Refills    SITagliptin (JANUVIA) 50 MG tablet 90 tablet 1     Sig: Take 1 tablet by mouth daily       Last Office Visit:   5/17/2021        Next Visit Date:  Future Appointments   Date Time Provider Debra Pickens   8/31/2021  8:30 AM Norbert Cho  [place], [place] [address]

## 2021-08-31 ENCOUNTER — OFFICE VISIT (OUTPATIENT)
Dept: FAMILY MEDICINE CLINIC | Age: 76
End: 2021-08-31
Payer: MEDICARE

## 2021-08-31 VITALS
SYSTOLIC BLOOD PRESSURE: 130 MMHG | WEIGHT: 188 LBS | HEART RATE: 65 BPM | OXYGEN SATURATION: 95 % | BODY MASS INDEX: 28.49 KG/M2 | DIASTOLIC BLOOD PRESSURE: 80 MMHG | TEMPERATURE: 98.2 F | RESPIRATION RATE: 14 BRPM | HEIGHT: 68 IN

## 2021-08-31 DIAGNOSIS — N18.32 STAGE 3B CHRONIC KIDNEY DISEASE (HCC): ICD-10-CM

## 2021-08-31 DIAGNOSIS — I10 ESSENTIAL HYPERTENSION: ICD-10-CM

## 2021-08-31 DIAGNOSIS — E78.2 MIXED HYPERLIPIDEMIA: ICD-10-CM

## 2021-08-31 DIAGNOSIS — D64.9 ANEMIA, UNSPECIFIED TYPE: ICD-10-CM

## 2021-08-31 DIAGNOSIS — E11.9 NEW ONSET TYPE 2 DIABETES MELLITUS (HCC): Primary | ICD-10-CM

## 2021-08-31 PROCEDURE — 2022F DILAT RTA XM EVC RTNOPTHY: CPT | Performed by: FAMILY MEDICINE

## 2021-08-31 PROCEDURE — 3017F COLORECTAL CA SCREEN DOC REV: CPT | Performed by: FAMILY MEDICINE

## 2021-08-31 PROCEDURE — 4040F PNEUMOC VAC/ADMIN/RCVD: CPT | Performed by: FAMILY MEDICINE

## 2021-08-31 PROCEDURE — 1123F ACP DISCUSS/DSCN MKR DOCD: CPT | Performed by: FAMILY MEDICINE

## 2021-08-31 PROCEDURE — 99214 OFFICE O/P EST MOD 30 MIN: CPT | Performed by: FAMILY MEDICINE

## 2021-08-31 PROCEDURE — 3051F HG A1C>EQUAL 7.0%<8.0%: CPT | Performed by: FAMILY MEDICINE

## 2021-08-31 PROCEDURE — G8417 CALC BMI ABV UP PARAM F/U: HCPCS | Performed by: FAMILY MEDICINE

## 2021-08-31 PROCEDURE — G8427 DOCREV CUR MEDS BY ELIG CLIN: HCPCS | Performed by: FAMILY MEDICINE

## 2021-08-31 PROCEDURE — 1036F TOBACCO NON-USER: CPT | Performed by: FAMILY MEDICINE

## 2021-08-31 RX ORDER — LANCETS 30 GAUGE
EACH MISCELLANEOUS
Qty: 300 EACH | Refills: 5 | Status: SHIPPED | OUTPATIENT
Start: 2021-08-31

## 2021-08-31 ASSESSMENT — ENCOUNTER SYMPTOMS
DIARRHEA: 0
VOMITING: 0
COUGH: 0

## 2021-08-31 NOTE — PROGRESS NOTES
Subjective:      Patient ID: Sarah Tavarez is a 76 y.o. male. HPI    Review of Systems  Treatment Adherence:   Medication compliance:  {Desc; compliance:5303::\"compliant most of the time\"}  Diet compliance:  {Desc; compliance:5303::\"compliant most of the time\"}  Weight trend: {INCREASING/DECREASING/STABLE:41443}  Current exercise: {EXERCISE UMQX:872441923}  What might prevent you from meeting your goal?: {Barriers to success:32367}  Patient plan for overcoming barriers: {COMMENT/NA:011114022}     Patient Confidence: {NUMBERS 1-10:79644}/10      Diabetes Mellitus Type 2: Current symptoms/problems include {Symptoms; diabetes:80863::\"none\"}. Home blood sugar records:  {diabetes glucometry results:73353}  Any episodes of hypoglycemia? {yes***/no:40885}  Eye exam current (within one year): {yes/no/unknown:74}  Tobacco history: He  reports that he quit smoking about 45 years ago. He has a 16.50 pack-year smoking history. He has never used smokeless tobacco.   Daily Aspirin? {yes no:091569::\"Yes\"}  Known diabetic complications: {diabetes complications:1215}    Hypertension:  Home blood pressure monitoring: {NO/YES:9189500776::\"No\"}. He {is/is not:9024} adherent to a low sodium diet. Patient {denies/complains:65819} {Symptoms of Hypertension, Denies:16584}. Antihypertensive medication side effects: {Hypertension med side effects:5728::\"no medication side effects noted\"}. Use of agents associated with hypertension: {bp agents assoc with hypertension:511::\"none\"}. Hyperlipidemia:  No new myalgias or GI upset on {RP HYPERLIPIDEMIA MEDS:37103}.        Lab Results   Component Value Date    LABA1C 7.2 (H) 05/12/2021    LABA1C 8.0 (H) 02/03/2021    LABA1C 6.9 (H) 10/15/2020     Lab Results   Component Value Date    LABMICR 2.40 (H) 10/15/2020    CREATININE 1.61 (H) 05/12/2021     Lab Results   Component Value Date    ALT 26 05/12/2021    AST 30 05/12/2021     Lab Results   Component Value Date    CHOL 126 05/12/2021 TRIG 140 05/12/2021    HDL 35 (L) 05/12/2021    LDLCALC 63 05/12/2021        Objective:   Physical Exam    Assessment / Plan:

## 2021-08-31 NOTE — PROGRESS NOTES
Subjective:      Patient ID: Daryle Piper is a 76 y.o. male    Diabetes  He presents for his follow-up diabetic visit. He has type 2 diabetes mellitus. Symptoms are stable. Current diabetic treatment includes oral agent (monotherapy). Hypertension  This is a chronic problem. The current episode started more than 1 year ago. Past treatments include diuretics, angiotensin blockers and beta blockers. The current treatment provides moderate improvement. Hypertensive end-organ damage includes kidney disease and CAD/MI. Hyperlipidemia  This is a chronic problem. The current episode started more than 1 year ago. Current antihyperlipidemic treatment includes statins. hre in follow up for diabetes and htn and lipids and borderline anemia . Fasting glucose in am around 150 or so. No low blood sugar reactions. Weight down few pounds since last time    Review of Systems   Constitutional: Negative for chills and fever. Respiratory: Negative for cough. Gastrointestinal: Negative for diarrhea and vomiting. Skin: Negative for rash.      Reviewed allergy, medical, social, surgical, family and med list changes and updated   Files     Social History     Socioeconomic History    Marital status:      Spouse name: None    Number of children: None    Years of education: None    Highest education level: None   Occupational History    None   Tobacco Use    Smoking status: Former Smoker     Packs/day: 1.50     Years: 11.00     Pack years: 16.50     Quit date: 1976     Years since quittin.3    Smokeless tobacco: Never Used   Vaping Use    Vaping Use: Never used   Substance and Sexual Activity    Alcohol use: No    Drug use: No    Sexual activity: Yes     Partners: Female   Other Topics Concern    None   Social History Narrative    None     Social Determinants of Health     Financial Resource Strain: Low Risk     Difficulty of Paying Living Expenses: Not hard at all   Food Insecurity: No Food Insecurity    Worried About Running Out of Food in the Last Year: Never true    Z[place]han of Food in the Last Year: Never true   Transportation Needs:     Lack of Transportation (Medical):  Lack of Transportation (Non-Medical):    Physical Activity:     Days of Exercise per Week:     Minutes of Exercise per Session:    Stress:     Feeling of Stress :    Social Connections:     Frequency of Communication with Friends and Family:     Frequency of Social Gatherings with Friends and Family:     Attends Protestant Services:     Active Member of Clubs or Organizations:     Attends Club or Organization Meetings:     Marital Status:    Intimate Partner Violence:     Fear of Current or Ex-Partner:     Emotionally Abused:     Physically Abused:     Sexually Abused:      Current Outpatient Medications   Medication Sig Dispense Refill    SITagliptin (JANUVIA) 50 MG tablet Take 1 tablet by mouth daily 90 tablet 1    blood glucose test strips (ONETOUCH ULTRA) strip USE TO TEST BLOOD GLUCOSE LEVELS DAILY 100 each 0    Lancets MISC Test 2 times a day & as needed for symptoms of irregular blood glucose. , 300 each 1    Blood Glucose Monitoring Suppl (ONE TOUCH ULTRA 2) w/Device KIT TEST 2 TIMES A DAY  0    blood glucose monitor kit and supplies Test 2 times a day & as needed for symptoms of irregular blood glucose. 1 kit 0    metoprolol tartrate (LOPRESSOR) 50 MG tablet       ketoconazole (NIZORAL) 2 % cream Apply topically daily x 2 weeks 30 g 1    hydrocortisone 2.5 % cream Apply topically 2 times daily x 2 weeks 1 Tube 0    sildenafil (VIAGRA) 100 MG tablet Take 1 tablet by mouth as needed for Erectile Dysfunction 20 tablet 5    valsartan-hydrochlorothiazide (DIOVAN-HCT) 320-25 MG per tablet Take 1 tab po QD      atorvastatin (LIPITOR) 40 MG tablet Take 40 mg by mouth daily.  NITROSTAT 0.4 MG SL tablet Place 0.4 mg under the tongue every 5 minutes as needed.       aspirin 81 MG tablet Take 81 mg by mouth daily. No current facility-administered medications for this visit. Family History   Problem Relation Age of Onset    Heart Disease Mother     Heart Disease Sister      Past Medical History:   Diagnosis Date    BPH (benign prostatic hypertrophy)     Erectile dysfunction     Heart palpitations     History of heart attack     Hx of blood clots     Hyperlipidemia     Hypertension     PSA elevation 2011    TOTAL-6.6, FREE 1.9, PERCENT FREE 29     Objective:   /80   Pulse 65   Temp 98.2 °F (36.8 °C)   Resp 14   Ht 5' 8\" (1.727 m)   Wt 188 lb (85.3 kg)   SpO2 95%   BMI 28.59 kg/m²     Physical Exam  Neck:no carotid bruits. No masses. No adenopathy. No thyroid asymmetry. Lungs:clear and equal breath sounds. No wheezes or rales. Heart:rate reg. No murmur. No gallops   Pulses:Radials 2+ equal               Poster tib 1+ equal  Extremities:no edema in either leg  Gen: In no acute distress  Abdomen; B.S present. Soft  Non tender. No hepatosplenomegaly. No masses         Dorsalis pedis and posterior tibial pulses are symmetric. No fissures between the toes. No open sores on the feet. Tactile sensation intact   Assessment:       Diagnosis Orders   1. New onset type 2 diabetes mellitus (HCC)  Microalbumin / Creatinine Urine Ratio   2. Mixed hyperlipidemia     3. Essential hypertension     4. Anemia, unspecified type     5.  Stage 3b chronic kidney disease ([address]           Plan:      Orders Placed This Encounter   Medications    Lancets MISC     Sig: Test 2 times a day & as needed for symptoms of irregular blood glucose.,     Dispense:  300 each     Refill:  5     Continue current meds   Non fasting blood work today- if blood work stable-f/u in spring

## 2021-10-20 RX ORDER — BLOOD SUGAR DIAGNOSTIC
STRIP MISCELLANEOUS
Qty: 100 EACH | Refills: 0 | Status: SHIPPED | OUTPATIENT
Start: 2021-10-20 | End: 2022-01-24

## 2022-01-12 ENCOUNTER — OFFICE VISIT (OUTPATIENT)
Dept: FAMILY MEDICINE CLINIC | Age: 77
End: 2022-01-12
Payer: MEDICARE

## 2022-01-12 VITALS
OXYGEN SATURATION: 98 % | RESPIRATION RATE: 16 BRPM | SYSTOLIC BLOOD PRESSURE: 132 MMHG | DIASTOLIC BLOOD PRESSURE: 80 MMHG | HEART RATE: 70 BPM | WEIGHT: 188 LBS | TEMPERATURE: 97.6 F | HEIGHT: 68 IN | BODY MASS INDEX: 28.49 KG/M2

## 2022-01-12 DIAGNOSIS — I25.9 CHEST PAIN DUE TO MYOCARDIAL ISCHEMIA, UNSPECIFIED ISCHEMIC CHEST PAIN TYPE: ICD-10-CM

## 2022-01-12 DIAGNOSIS — N18.32 STAGE 3B CHRONIC KIDNEY DISEASE (HCC): ICD-10-CM

## 2022-01-12 DIAGNOSIS — E11.9 NEW ONSET TYPE 2 DIABETES MELLITUS (HCC): ICD-10-CM

## 2022-01-12 DIAGNOSIS — Z09 HOSPITAL DISCHARGE FOLLOW-UP: Primary | ICD-10-CM

## 2022-01-12 LAB
ANION GAP SERPL CALCULATED.3IONS-SCNC: 12 MEQ/L (ref 9–15)
BUN BLDV-MCNC: 25 MG/DL (ref 8–23)
CALCIUM SERPL-MCNC: 9.2 MG/DL (ref 8.5–9.9)
CHLORIDE BLD-SCNC: 101 MEQ/L (ref 95–107)
CO2: 24 MEQ/L (ref 20–31)
CREAT SERPL-MCNC: 1.54 MG/DL (ref 0.7–1.2)
CREATININE URINE: 165.5 MG/DL
GFR AFRICAN AMERICAN: 53.4
GFR NON-AFRICAN AMERICAN: 44.1
GLUCOSE BLD-MCNC: 208 MG/DL (ref 70–99)
HBA1C MFR BLD: 8.8 % (ref 4.8–5.9)
MICROALBUMIN UR-MCNC: 2.6 MG/DL
MICROALBUMIN/CREAT UR-RTO: 15.7 MG/G (ref 0–30)
POTASSIUM SERPL-SCNC: 4.2 MEQ/L (ref 3.4–4.9)
SODIUM BLD-SCNC: 137 MEQ/L (ref 135–144)

## 2022-01-12 PROCEDURE — 1036F TOBACCO NON-USER: CPT | Performed by: FAMILY MEDICINE

## 2022-01-12 PROCEDURE — G8417 CALC BMI ABV UP PARAM F/U: HCPCS | Performed by: FAMILY MEDICINE

## 2022-01-12 PROCEDURE — G8428 CUR MEDS NOT DOCUMENT: HCPCS | Performed by: FAMILY MEDICINE

## 2022-01-12 PROCEDURE — 99214 OFFICE O/P EST MOD 30 MIN: CPT | Performed by: FAMILY MEDICINE

## 2022-01-12 PROCEDURE — 4040F PNEUMOC VAC/ADMIN/RCVD: CPT | Performed by: FAMILY MEDICINE

## 2022-01-12 PROCEDURE — 1111F DSCHRG MED/CURRENT MED MERGE: CPT | Performed by: FAMILY MEDICINE

## 2022-01-12 PROCEDURE — G8484 FLU IMMUNIZE NO ADMIN: HCPCS | Performed by: FAMILY MEDICINE

## 2022-01-12 PROCEDURE — 1123F ACP DISCUSS/DSCN MKR DOCD: CPT | Performed by: FAMILY MEDICINE

## 2022-01-12 ASSESSMENT — ENCOUNTER SYMPTOMS
DIARRHEA: 0
COUGH: 0
VOMITING: 0
SHORTNESS OF BREATH: 0

## 2022-01-12 NOTE — PROGRESS NOTES
Subjective:      Patient ID: Lori Bautista is a 68 y.o. male    Chest Pain   This is a new problem. The current episode started in the past 7 days. The onset quality is sudden. Pertinent negatives include no cough, fever, shortness of breath, vomiting or weakness. Diabetes  Associated symptoms include chest pain. Pertinent negatives for diabetes include no weakness. Here in follow up from recent hospital stat for chest pain. Known cad. Now on nitrate per results of cardiac cath. .   Does have follow up with cardiology next week. No further chest pain. d di  Fasting glucose 200. No changes with weight. Review of Systems   Constitutional: Negative for chills and fever. Respiratory: Negative for cough and shortness of breath. Cardiovascular: Positive for chest pain. Gastrointestinal: Negative for diarrhea and vomiting. Neurological: Negative for weakness.      Reviewed allergy, medical, social, surgical, family and med list changes and updated   Files     Social History     Socioeconomic History    Marital status:      Spouse name: None    Number of children: None    Years of education: None    Highest education level: None   Occupational History    None   Tobacco Use    Smoking status: Former Smoker     Packs/day: 1.50     Years: 11.00     Pack years: 16.50     Quit date: 1976     Years since quittin.7    Smokeless tobacco: Never Used   Vaping Use    Vaping Use: Never used   Substance and Sexual Activity    Alcohol use: No    Drug use: No    Sexual activity: Yes     Partners: Female   Other Topics Concern    None   Social History Narrative    None     Social Determinants of Health     Financial Resource Strain: Low Risk     Difficulty of Paying Living Expenses: Not hard at all   Food Insecurity: No Food Insecurity    Worried About Running Out of Food in the Last Year: Never true    [place] of Food in the Last Year: Never true   Transportation Needs:     Lack of Transportation (Medical): Not on file    Lack of Transportation (Non-Medical): Not on file   Physical Activity:     Days of Exercise per Week: Not on file    Minutes of Exercise per Session: Not on file   Stress:     Feeling of Stress : Not on file   Social Connections:     Frequency of Communication with Friends and Family: Not on file    Frequency of Social Gatherings with Friends and Family: Not on file    Attends Religion Services: Not on file    Active Member of [address] [place] [address] or Organizations: Not on file    Attends Club or Organization Meetings: Not on file    Marital Status: Not on file   Intimate Partner Violence:     Fear of Current or Ex-Partner: Not on file    Emotionally Abused: Not on file    Physically Abused: Not on file    Sexually Abused: Not on file   Housing Stability:     Unable to Pay for Housing in the Last Year: Not on file    Number of [place] in the Last Year: Not on file    Unstable Housing in the Last Year: Not on file     Current Outpatient Medications   Medication Sig Dispense Refill    ONETOUCH ULTRA strip USE TO TEST BLOOD GLUCOSE LEVELS DAILY 100 each 0    Lancets MISC Test 2 times a day & as needed for symptoms of irregular blood glucose. , 300 each 5    SITagliptin (JANUVIA) 50 MG tablet Take 1 tablet by mouth daily 90 tablet 1    Blood Glucose Monitoring Suppl (ONE TOUCH ULTRA 2) w/Device KIT TEST 2 TIMES A DAY  0    blood glucose monitor kit and supplies Test 2 times a day & as needed for symptoms of irregular blood glucose.  1 kit 0    metoprolol tartrate (LOPRESSOR) 50 MG tablet       ketoconazole (NIZORAL) 2 % cream Apply topically daily x 2 weeks 30 g 1    hydrocortisone 2.5 % cream Apply topically 2 times daily x 2 weeks 1 Tube 0    sildenafil (VIAGRA) 100 MG tablet Take 1 tablet by mouth as needed for Erectile Dysfunction 20 tablet 5    valsartan-hydrochlorothiazide (DIOVAN-HCT) 320-25 MG per tablet Take 1 tab po QD      atorvastatin (LIPITOR) 40 MG tablet Take 40 mg by mouth daily.  NITROSTAT 0.4 MG SL tablet Place 0.4 mg under the tongue every 5 minutes as needed.  aspirin 81 MG tablet Take 81 mg by mouth daily. No current facility-administered medications for this visit. Family History   Problem Relation Age of Onset    Heart Disease Mother     Heart Disease Sister      Past Medical History:   Diagnosis Date    BPH (benign prostatic hypertrophy)     Erectile dysfunction     Heart palpitations     History of heart attack     Hx of blood clots     Hyperlipidemia     Hypertension     PSA elevation 2011    TOTAL-6.6, FREE 1.9, PERCENT FREE 29     Objective:   /80   Pulse 70   Temp 97.6 °F (36.4 °C) (Tympanic)   Resp 16   Ht 5' 8\" (1.727 m)   Wt 188 lb (85.3 kg)   SpO2 98%   BMI 28.59 kg/m²     Physical Exam  Neck:no carotid bruits. No masses. No adenopathy. No thyroid asymmetry. Lungs:clear and equal breath sounds. No wheezes or rales. Heart:rate reg. 2/6 syst murmur along llsb  No gallops   Pulses:Radials 2+ equal               Poster tib 1+ equal  Extremities:no edema in either leg  Gen: In no acute distress  Abdomen; B.S present. Soft  Non tender. No hepatosplenomegaly. No masses         Dorsalis pedis and posterior tibial pulses are symmetric. No fissures between the toes. No open sores on the feet. Tactile sensation intact   Assessment:       Diagnosis Orders   1. Hospital discharge follow-up     2. Chest pain due to myocardial ischemia, unspecified ischemic chest pain type     3.  New onset type 2 diabetes mellitus ([address]     4. Stage 3b chronic kidney disease ([address]           Plan:      Orders Placed This Encounter   Procedures    Hemoglobin A1C     Standing Status:   Future     Standing Expiration Date:   1/12/2023    Basic Metabolic Panel     Standing Status:   Future     Standing Expiration Date:   1/12/2023     Continue current medications   F/u with cardiology as already planned

## 2022-01-21 ENCOUNTER — VIRTUAL VISIT (OUTPATIENT)
Dept: FAMILY MEDICINE CLINIC | Age: 77
End: 2022-01-21
Payer: MEDICARE

## 2022-01-21 DIAGNOSIS — E11.9 NEW ONSET TYPE 2 DIABETES MELLITUS (HCC): Primary | ICD-10-CM

## 2022-01-21 PROCEDURE — 99442 PR PHYS/QHP TELEPHONE EVALUATION 11-20 MIN: CPT | Performed by: FAMILY MEDICINE

## 2022-01-21 RX ORDER — GLIMEPIRIDE 1 MG/1
TABLET ORAL
Qty: 45 TABLET | Refills: 0 | Status: SHIPPED | OUTPATIENT
Start: 2022-01-21 | End: 2022-02-22 | Stop reason: SDUPTHER

## 2022-01-21 ASSESSMENT — ENCOUNTER SYMPTOMS
VOMITING: 0
NAUSEA: 0

## 2022-01-21 ASSESSMENT — PATIENT HEALTH QUESTIONNAIRE - PHQ9
SUM OF ALL RESPONSES TO PHQ9 QUESTIONS 1 & 2: 0
SUM OF ALL RESPONSES TO PHQ QUESTIONS 1-9: 0
SUM OF ALL RESPONSES TO PHQ QUESTIONS 1-9: 0
2. FEELING DOWN, DEPRESSED OR HOPELESS: 0
SUM OF ALL RESPONSES TO PHQ QUESTIONS 1-9: 0
1. LITTLE INTEREST OR PLEASURE IN DOING THINGS: 0
SUM OF ALL RESPONSES TO PHQ QUESTIONS 1-9: 0

## 2022-01-21 NOTE — PROGRESS NOTES
Subjective:      Patient ID: Sapna Up is a 68 y.o. male    HPI  Here in follow up for diabetes and blood work. No changes since last time    Review of Systems   Constitutional: Negative for chills and fever. Gastrointestinal: Negative for nausea and vomiting. Skin: Negative for rash. Sapna Up is a 68 y.o. male evaluated via telephone on 2022. Consent:  He and/or health care decision maker is aware that that he may receive a bill for this telephone service, depending on his insurance coverage, and has provided verbal consent to proceed: Yes      Documentation:  I communicated with the patient and/or health care decision maker about . Details of this discussion including any medical advice provided: This visit was a telephone encounter. Patient was located at their home. Provider was located at their ___ home or        ____ office. I affirm this is a Patient Initiated Episode with an Established Patient who has not had a related appointment within my department in the past 7 days or scheduled within the next 24 hours.     Total Time: minutes: 11-20 minutes    Note: not billable if this call serves to triage the patient into an appointment for the relevant concern      Naomi Segovia MD   Reviewed allergy, medical, social, surgical, family and med list changes and updated   Files--reviewed blood work with elevated a1c     Social History     Socioeconomic History    Marital status:      Spouse name: Not on file    Number of children: Not on file    Years of education: Not on file    Highest education level: Not on file   Occupational History    Not on file   Tobacco Use    Smoking status: Former Smoker     Packs/day: 1.50     Years: 11.00     Pack years: 16.50     Quit date: 1976     Years since quittin.7    Smokeless tobacco: Never Used   Vaping Use    Vaping Use: Never used   Substance and Sexual Activity    Alcohol use: No    Drug use: No    Sexual activity: Yes     Partners: Female   Other Topics Concern    Not on file   Social History Narrative    Not on file     Social Determinants of Health     Financial Resource Strain: Low Risk     Difficulty of Paying Living Expenses: Not hard at all   Food Insecurity: No Food Insecurity    Worried About Running Out of Food in the Last Year: Never true    [address] in the Last Year: Never true   Transportation Needs:     Lack of Transportation (Medical): Not on file    Lack of Transportation (Non-Medical): Not on file   Physical Activity:     Days of Exercise per Week: Not on file    Minutes of Exercise per Session: Not on file   Stress:     Feeling of Stress : Not on file   Social Connections:     Frequency of Communication with Friends and Family: Not on file    Frequency of Social Gatherings with Friends and Family: Not on file    Attends Evangelical Services: Not on file    Active Member of [address] [place] [address] [employer] or Organizations: Not on file    Attends Club or Organization Meetings: Not on file    Marital Status: Not on file   Intimate Partner Violence:     Fear of Current or Ex-Partner: Not on file    Emotionally Abused: Not on file    Physically Abused: Not on file    Sexually Abused: Not on file   Housing Stability:     Unable to Pay for Housing in the Last Year: Not on file    Number of [place] in the Last Year: Not on file    Unstable Housing in the Last Year: Not on file     Current Outpatient Medications   Medication Sig Dispense Refill    ONETOUCH ULTRA strip USE TO TEST BLOOD GLUCOSE LEVELS DAILY 100 each 0    Lancets MISC Test 2 times a day & as needed for symptoms of irregular blood glucose. , 300 each 5    SITagliptin (JANUVIA) 50 MG tablet Take 1 tablet by mouth daily 90 tablet 1    Blood Glucose Monitoring Suppl (ONE TOUCH ULTRA 2) w/Device KIT TEST 2 TIMES A DAY  0    blood glucose monitor kit and supplies Test 2 times a day & as needed for symptoms of irregular blood glucose. 1 kit 0    metoprolol tartrate (LOPRESSOR) 50 MG tablet       ketoconazole (NIZORAL) 2 % cream Apply topically daily x 2 weeks 30 g 1    hydrocortisone 2.5 % cream Apply topically 2 times daily x 2 weeks 1 Tube 0    sildenafil (VIAGRA) 100 MG tablet Take 1 tablet by mouth as needed for Erectile Dysfunction 20 tablet 5    valsartan-hydrochlorothiazide (DIOVAN-HCT) 320-25 MG per tablet Take 1 tab po QD      atorvastatin (LIPITOR) 40 MG tablet Take 40 mg by mouth daily.  NITROSTAT 0.4 MG SL tablet Place 0.4 mg under the tongue every 5 minutes as needed.  aspirin 81 MG tablet Take 81 mg by mouth daily. No current facility-administered medications for this visit. Family History   Problem Relation Age of Onset    Heart Disease Mother     Heart Disease Sister      Past Medical History:   Diagnosis Date    BPH (benign prostatic hypertrophy)     Erectile dysfunction     Heart palpitations     History of heart attack     Hx of blood clots     Hyperlipidemia     Hypertension     PSA elevation     TOTAL-6.6, FREE 1.9, PERCENT FREE 29     Objective: There were no vitals taken for this visit. Physical Exam  No exam done   Assessment:       Diagnosis Orders   1.  New onset type 2 diabetes mellitus ([facility]           Plan:    continue current medications   Orders Placed This Encounter   Medications    glimepiride (AMARYL) 1 MG tablet     Si/2 po q day     Dispense:  45 tablet     Refill:  0   f/u in 3 weeks

## 2022-01-24 RX ORDER — BLOOD SUGAR DIAGNOSTIC
STRIP MISCELLANEOUS
Qty: 50 STRIP | Refills: 1 | Status: SHIPPED | OUTPATIENT
Start: 2022-01-24 | End: 2022-01-26 | Stop reason: SDUPTHER

## 2022-01-24 NOTE — TELEPHONE ENCOUNTER
Pharmacy requesting medication refill. Please approve or deny this request.    Rx requested:  Requested Prescriptions     Pending Prescriptions Disp Refills    ONETOUCH ULTRA strip [Pharmacy Med Name: OneTouch Ultra In Vitro Strip]  0     Sig: USE TO TEST BLOOD GLUCOSE LEVELS DAILY         Last Office Visit:   1/21/2022      Next Visit Date:  No future appointments.

## 2022-01-26 ENCOUNTER — TELEPHONE (OUTPATIENT)
Dept: FAMILY MEDICINE CLINIC | Age: 77
End: 2022-01-26

## 2022-01-26 DIAGNOSIS — E11.9 NEW ONSET TYPE 2 DIABETES MELLITUS (HCC): Primary | ICD-10-CM

## 2022-01-26 RX ORDER — BLOOD SUGAR DIAGNOSTIC
STRIP MISCELLANEOUS
Qty: 100 STRIP | Refills: 5 | Status: SHIPPED | OUTPATIENT
Start: 2022-01-26

## 2022-01-26 RX ORDER — BLOOD SUGAR DIAGNOSTIC
STRIP MISCELLANEOUS
Qty: 50 STRIP | Refills: 1 | Status: SHIPPED | OUTPATIENT
Start: 2022-01-26 | End: 2022-01-26 | Stop reason: SDUPTHER

## 2022-02-22 RX ORDER — GLIMEPIRIDE 1 MG/1
TABLET ORAL
Qty: 45 TABLET | Refills: 0 | Status: SHIPPED | OUTPATIENT
Start: 2022-02-22 | End: 2022-02-24 | Stop reason: SDUPTHER

## 2022-02-22 NOTE — TELEPHONE ENCOUNTER
Rx request   Requested Prescriptions     Pending Prescriptions Disp Refills    glimepiride (AMARYL) 1 MG tablet 45 tablet 0     Si/2 po q day    SITagliptin (JANUVIA) 50 MG tablet 90 tablet 1     Sig: Take 1 tablet by mouth daily     LOV 2022  Next Visit Date:  No future appointments.

## 2022-02-24 RX ORDER — GLIMEPIRIDE 1 MG/1
TABLET ORAL
Qty: 135 TABLET | Refills: 0 | Status: SHIPPED | OUTPATIENT
Start: 2022-02-24 | End: 2022-05-08

## 2022-02-24 NOTE — TELEPHONE ENCOUNTER
Patient requesting medication refill. He states he requested these medications on  but they were sent to [facility] instead of [facility]. He also wants you to know that he has been taking 1 1/2 tabs instead of 1/2 tab of the Glimepiride for the past month. Rx requested:  Requested Prescriptions     Pending Prescriptions Disp Refills    SITagliptin (JANUVIA) 50 MG tablet 90 tablet 0     Sig: Take 1 tablet by mouth daily    glimepiride (AMARYL) 1 MG tablet 45 tablet 0     Si/2 po q day       Last Office Visit:   2022        Next Visit Date:  No future appointments.

## 2022-05-08 RX ORDER — GLIMEPIRIDE 1 MG/1
TABLET ORAL
Qty: 135 TABLET | Refills: 0 | Status: SHIPPED | OUTPATIENT
Start: 2022-05-08 | End: 2022-05-11 | Stop reason: SDUPTHER

## 2022-05-08 RX ORDER — SITAGLIPTIN 50 MG/1
TABLET, FILM COATED ORAL
Qty: 90 TABLET | Refills: 0 | Status: SHIPPED | OUTPATIENT
Start: 2022-05-08 | End: 2022-05-11 | Stop reason: SDUPTHER

## 2022-05-11 ENCOUNTER — OFFICE VISIT (OUTPATIENT)
Dept: FAMILY MEDICINE CLINIC | Age: 77
End: 2022-05-11
Payer: MEDICARE

## 2022-05-11 VITALS
HEIGHT: 68 IN | WEIGHT: 196.2 LBS | BODY MASS INDEX: 29.73 KG/M2 | RESPIRATION RATE: 14 BRPM | SYSTOLIC BLOOD PRESSURE: 120 MMHG | OXYGEN SATURATION: 95 % | TEMPERATURE: 97 F | DIASTOLIC BLOOD PRESSURE: 78 MMHG | HEART RATE: 69 BPM

## 2022-05-11 DIAGNOSIS — E11.65 TYPE 2 DIABETES MELLITUS WITH HYPERGLYCEMIA, WITHOUT LONG-TERM CURRENT USE OF INSULIN (HCC): ICD-10-CM

## 2022-05-11 DIAGNOSIS — E11.22 TYPE 2 DIABETES MELLITUS WITH CHRONIC KIDNEY DISEASE, WITHOUT LONG-TERM CURRENT USE OF INSULIN, UNSPECIFIED CKD STAGE (HCC): ICD-10-CM

## 2022-05-11 DIAGNOSIS — N18.30 STAGE 3 CHRONIC KIDNEY DISEASE, UNSPECIFIED WHETHER STAGE 3A OR 3B CKD (HCC): ICD-10-CM

## 2022-05-11 DIAGNOSIS — E11.9 NEW ONSET TYPE 2 DIABETES MELLITUS (HCC): ICD-10-CM

## 2022-05-11 DIAGNOSIS — E11.9 NEW ONSET TYPE 2 DIABETES MELLITUS (HCC): Primary | ICD-10-CM

## 2022-05-11 LAB — HBA1C MFR BLD: 7.5 % (ref 4.8–5.9)

## 2022-05-11 PROCEDURE — 3052F HG A1C>EQUAL 8.0%<EQUAL 9.0%: CPT | Performed by: FAMILY MEDICINE

## 2022-05-11 PROCEDURE — G8417 CALC BMI ABV UP PARAM F/U: HCPCS | Performed by: FAMILY MEDICINE

## 2022-05-11 PROCEDURE — 1036F TOBACCO NON-USER: CPT | Performed by: FAMILY MEDICINE

## 2022-05-11 PROCEDURE — 99213 OFFICE O/P EST LOW 20 MIN: CPT | Performed by: FAMILY MEDICINE

## 2022-05-11 PROCEDURE — G8427 DOCREV CUR MEDS BY ELIG CLIN: HCPCS | Performed by: FAMILY MEDICINE

## 2022-05-11 PROCEDURE — 4040F PNEUMOC VAC/ADMIN/RCVD: CPT | Performed by: FAMILY MEDICINE

## 2022-05-11 PROCEDURE — 1123F ACP DISCUSS/DSCN MKR DOCD: CPT | Performed by: FAMILY MEDICINE

## 2022-05-11 RX ORDER — VALSARTAN AND HYDROCHLOROTHIAZIDE 320; 25 MG/1; MG/1
1 TABLET, FILM COATED ORAL DAILY
Qty: 90 TABLET | Refills: 1 | Status: CANCELLED | OUTPATIENT
Start: 2022-05-11 | End: 2022-08-09

## 2022-05-11 RX ORDER — SILDENAFIL 100 MG/1
100 TABLET, FILM COATED ORAL PRN
Qty: 20 TABLET | Refills: 5 | Status: CANCELLED | OUTPATIENT
Start: 2022-05-11

## 2022-05-11 RX ORDER — GLIMEPIRIDE 1 MG/1
TABLET ORAL
Qty: 135 TABLET | Refills: 0 | Status: SHIPPED | OUTPATIENT
Start: 2022-05-11 | End: 2022-08-01

## 2022-05-11 RX ORDER — ATORVASTATIN CALCIUM 40 MG/1
40 TABLET, FILM COATED ORAL DAILY
Qty: 90 TABLET | Refills: 1 | Status: CANCELLED | OUTPATIENT
Start: 2022-05-11

## 2022-05-11 RX ORDER — METOPROLOL TARTRATE 50 MG/1
50 TABLET, FILM COATED ORAL DAILY
Qty: 90 TABLET | Refills: 1 | Status: CANCELLED | OUTPATIENT
Start: 2022-05-11

## 2022-05-11 ASSESSMENT — ENCOUNTER SYMPTOMS
COUGH: 0
DIARRHEA: 0
VOMITING: 0

## 2022-05-11 NOTE — PROGRESS NOTES
Subjective:      Patient ID: Gaurav Mac is a 68 y.o. male    HPI  Here in follow up for diabetes. Fasting glucose around 130. No low blood sugar reactions. Weight up 8 pounds since last time. Activity level low over winter. Review of Systems   Constitutional: Negative for chills and fever. Respiratory: Negative for cough. Gastrointestinal: Negative for diarrhea and vomiting. Skin: Negative for rash. Neurological: Negative for weakness. Reviewed allergy, medical, social, surgical, family and med list changes and updated   Files     Social History     Socioeconomic History    Marital status:      Spouse name: None    Number of children: None    Years of education: None    Highest education level: None   Occupational History    None   Tobacco Use    Smoking status: Former Smoker     Packs/day: 1.50     Years: 11.00     Pack years: 16.50     Quit date: 1976     Years since quittin.0    Smokeless tobacco: Never Used   Vaping Use    Vaping Use: Never used   Substance and Sexual Activity    Alcohol use: No    Drug use: No    Sexual activity: Yes     Partners: Female   Other Topics Concern    None   Social History Narrative    None     Social Determinants of Health     Financial Resource Strain: Low Risk     Difficulty of Paying Living Expenses: Not hard at all   Food Insecurity: No Food Insecurity    Worried About [address] in the Last Year: Never true    [address] in the Last Year: Never true   Transportation Needs:     Lack of Transportation (Medical): Not on file    Lack of Transportation (Non-Medical):  Not on file   Physical Activity:     Days of Exercise per Week: Not on file    Minutes of Exercise per Session: Not on file   Stress:     Feeling of Stress : Not on file   Social Connections:     Frequency of Communication with Friends and Family: Not on file    Frequency of Social Gatherings with Friends and Family: Not on file   Petrona Tomas Attends Catholic Services: Not on file    Active Member of Clubs or Organizations: Not on file    Attends Club or Organization Meetings: Not on file    Marital Status: Not on file   Intimate Partner Violence:     Fear of Current or Ex-Partner: Not on file    Emotionally Abused: Not on file    Physically Abused: Not on file    Sexually Abused: Not on file   Housing Stability:     Unable to Pay for Housing in the Last Year: Not on file    Number of [place] in the Last Year: Not on file    Unstable Housing in the Last Year: Not on file     Current Outpatient Medications   Medication Sig Dispense Refill    JANUVIA 50 MG tablet TAKE 1 TABLET DAILY. NEED BLOOD WORK AND FOLLOW UP PRIOR TO ANY MORE REFILL 90 tablet 0    glimepiride (AMARYL) 1 MG tablet TAKE ONE AND ONE-HALF TABLETS DAILY 135 tablet 0    blood glucose test strips (ONETOUCH ULTRA) strip Test once daily 100 strip 5    Lancets MISC Test 2 times a day & as needed for symptoms of irregular blood glucose. , 300 each 5    Blood Glucose Monitoring Suppl (ONE TOUCH ULTRA 2) w/Device KIT TEST 2 TIMES A DAY  0    blood glucose monitor kit and supplies Test 2 times a day & as needed for symptoms of irregular blood glucose. 1 kit 0    metoprolol tartrate (LOPRESSOR) 50 MG tablet       ketoconazole (NIZORAL) 2 % cream Apply topically daily x 2 weeks 30 g 1    hydrocortisone 2.5 % cream Apply topically 2 times daily x 2 weeks 1 Tube 0    sildenafil (VIAGRA) 100 MG tablet Take 1 tablet by mouth as needed for Erectile Dysfunction 20 tablet 5    valsartan-hydrochlorothiazide (DIOVAN-HCT) 320-25 MG per tablet Take 1 tab po QD      atorvastatin (LIPITOR) 40 MG tablet Take 40 mg by mouth daily.  NITROSTAT 0.4 MG SL tablet Place 0.4 mg under the tongue every 5 minutes as needed.  aspirin 81 MG tablet Take 81 mg by mouth daily. No current facility-administered medications for this visit.      Family History   Problem Relation Age of Onset  Heart Disease Mother     Heart Disease Sister      Past Medical History:   Diagnosis Date    BPH (benign prostatic hypertrophy)     Erectile dysfunction     Heart palpitations     History of heart attack     Hx of blood clots     Hyperlipidemia     Hypertension     PSA elevation 2011    TOTAL-6.6, FREE 1.9, PERCENT FREE 29     Objective:   /78   Pulse 69   Temp 97 °F (36.1 °C)   Resp 14   Ht 5' 8\" (1.727 m)   Wt 196 lb 3.2 oz (89 kg)   SpO2 95%   BMI 29.83 kg/m²     Physical Exam      Gen;   NAD  Dorsalis pedis and posterior tibial pulses are symmetric. No fissures between the toes. No open sores on the feet. Tactile sensation intact   Assessment:       Diagnosis Orders   1. [facility]     2. Type 2 diabetes mellitus with hyperglycemia, without long-term current use of insulin ([facility])     3.  Type 2 diabetes mellitus with chronic kidney disease, without long-term current use of insulin, unspecified CKD stage ([facility])     4. Stage 3 chronic kidney disease, unspecified whether stage 3a or 3b CKD ([facility]           Plan:    diabetes was unstable-repeating labs and adjusting meds  ckd-will recheck labs later this pring  Orders Placed This Encounter   Medications    SITagliptin (JANUVIA) 50 MG tablet     Sig: TAKE 1 TABLET DAILY     Dispense:  90 tablet     Refill:  1    glimepiride (AMARYL) 1 MG tablet     Sig: TAKE ONE AND ONE-HALF TABLETS DAILY     Dispense:  135 tablet     Refill:  0     Orders Placed This Encounter   Procedures    Hemoglobin A1C     Standing Status:   Future     Standing Expiration Date:   5/11/2023   f/u in 3 months after fasting blood work if above improved

## 2022-05-11 NOTE — PROGRESS NOTES
Subjective:      Patient ID: Cecilio Holly is a 68 y.o. male. HPI    Review of Systems  Treatment Adherence:   Medication compliance:  {Desc; compliance:5303::\"compliant most of the time\"}  Diet compliance:  {Desc; compliance:5303::\"compliant most of the time\"}  Weight trend: {INCREASING/DECREASING/STABLE:61288}  Current exercise: {EXERCISE GPVT:049155868}  What might prevent you from meeting your goal?: {Barriers to success:64889}  Patient plan for overcoming barriers: {COMMENT/NA:198334425}     Patient Confidence: {NUMBERS 1-10:61621}/10      Diabetes Mellitus Type 2: Current symptoms/problems include {Symptoms; diabetes:64703::\"none\"}. Home blood sugar records:  {diabetes glucometry results:38299}  Any episodes of hypoglycemia? {yes***/no:79959}  Eye exam current (within one year): {yes/no/unknown:74}  Tobacco history: He  reports that he quit smoking about 46 years ago. He has a 16.50 pack-year smoking history. He has never used smokeless tobacco.   Daily Aspirin? {yes no:266970::\"Yes\"}  Known diabetic complications: {diabetes complications:1215}    Hypertension:  Home blood pressure monitoring: {NO/YES:7317968920::\"No\"}. He {is/is not:9024} adherent to a low sodium diet. Patient {denies/complains:45271} {Symptoms of Hypertension, Denies:46832}. Antihypertensive medication side effects: {Hypertension med side effects:5728::\"no medication side effects noted\"}. Use of agents associated with hypertension: {bp agents assoc with hypertension:511::\"none\"}. Hyperlipidemia:  No new myalgias or GI upset on {RP HYPERLIPIDEMIA MEDS:33018}.        Lab Results   Component Value Date    LABA1C 8.8 (H) 01/12/2022    LABA1C 6.5 (H) 08/31/2021    LABA1C 7.2 (H) 05/12/2021     Lab Results   Component Value Date    LABMICR 2.60 (H) 01/12/2022    CREATININE 1.54 (H) 01/12/2022     Lab Results   Component Value Date    ALT 26 05/12/2021    AST 30 05/12/2021     Lab Results   Component Value Date    CHOL 126 05/12/2021 TRIG 140 05/12/2021    HDL 35 (L) 05/12/2021    LDLCALC 63 05/12/2021        Objective:   Physical Exam    Assessment / Plan:

## 2022-08-01 RX ORDER — GLIMEPIRIDE 1 MG/1
TABLET ORAL
Qty: 135 TABLET | Refills: 0 | Status: SHIPPED | OUTPATIENT
Start: 2022-08-01

## 2022-12-13 ENCOUNTER — OFFICE VISIT (OUTPATIENT)
Dept: FAMILY MEDICINE CLINIC | Age: 77
End: 2022-12-13
Payer: MEDICARE

## 2022-12-13 VITALS
BODY MASS INDEX: 27.92 KG/M2 | OXYGEN SATURATION: 97 % | WEIGHT: 184.2 LBS | TEMPERATURE: 96.7 F | DIASTOLIC BLOOD PRESSURE: 86 MMHG | HEART RATE: 79 BPM | SYSTOLIC BLOOD PRESSURE: 138 MMHG | HEIGHT: 68 IN

## 2022-12-13 DIAGNOSIS — E78.2 MIXED HYPERLIPIDEMIA: ICD-10-CM

## 2022-12-13 DIAGNOSIS — I10 ESSENTIAL HYPERTENSION: ICD-10-CM

## 2022-12-13 DIAGNOSIS — E11.65 TYPE 2 DIABETES MELLITUS WITH HYPERGLYCEMIA, WITHOUT LONG-TERM CURRENT USE OF INSULIN (HCC): ICD-10-CM

## 2022-12-13 DIAGNOSIS — N18.30 STAGE 3 CHRONIC KIDNEY DISEASE, UNSPECIFIED WHETHER STAGE 3A OR 3B CKD (HCC): ICD-10-CM

## 2022-12-13 DIAGNOSIS — R97.20 ELEVATED PSA: Primary | ICD-10-CM

## 2022-12-13 DIAGNOSIS — I44.2 COMPLETE HEART BLOCK (HCC): ICD-10-CM

## 2022-12-13 DIAGNOSIS — I25.10 CORONARY ARTERY DISEASE INVOLVING NATIVE HEART WITHOUT ANGINA PECTORIS, UNSPECIFIED VESSEL OR LESION TYPE: ICD-10-CM

## 2022-12-13 DIAGNOSIS — I21.9 MYOCARDIAL INFARCTION, UNSPECIFIED MI TYPE, UNSPECIFIED ARTERY (HCC): ICD-10-CM

## 2022-12-13 PROCEDURE — G8484 FLU IMMUNIZE NO ADMIN: HCPCS | Performed by: FAMILY MEDICINE

## 2022-12-13 PROCEDURE — 1036F TOBACCO NON-USER: CPT | Performed by: FAMILY MEDICINE

## 2022-12-13 PROCEDURE — 3051F HG A1C>EQUAL 7.0%<8.0%: CPT | Performed by: FAMILY MEDICINE

## 2022-12-13 PROCEDURE — G8427 DOCREV CUR MEDS BY ELIG CLIN: HCPCS | Performed by: FAMILY MEDICINE

## 2022-12-13 PROCEDURE — 99214 OFFICE O/P EST MOD 30 MIN: CPT | Performed by: FAMILY MEDICINE

## 2022-12-13 PROCEDURE — 3074F SYST BP LT 130 MM HG: CPT | Performed by: FAMILY MEDICINE

## 2022-12-13 PROCEDURE — 1123F ACP DISCUSS/DSCN MKR DOCD: CPT | Performed by: FAMILY MEDICINE

## 2022-12-13 PROCEDURE — G8417 CALC BMI ABV UP PARAM F/U: HCPCS | Performed by: FAMILY MEDICINE

## 2022-12-13 PROCEDURE — 3078F DIAST BP <80 MM HG: CPT | Performed by: FAMILY MEDICINE

## 2022-12-13 RX ORDER — VALSARTAN 160 MG/1
TABLET ORAL
COMMUNITY
Start: 2022-08-12

## 2022-12-13 RX ORDER — WARFARIN SODIUM 2 MG/1
TABLET ORAL
COMMUNITY
Start: 2022-11-14

## 2022-12-13 RX ORDER — CLOPIDOGREL BISULFATE 75 MG/1
TABLET ORAL
COMMUNITY
Start: 2022-12-08

## 2022-12-13 RX ORDER — ISOSORBIDE MONONITRATE 30 MG/1
TABLET, EXTENDED RELEASE ORAL
COMMUNITY
Start: 2022-08-12

## 2022-12-13 SDOH — ECONOMIC STABILITY: TRANSPORTATION INSECURITY
IN THE PAST 12 MONTHS, HAS LACK OF TRANSPORTATION KEPT YOU FROM MEETINGS, WORK, OR FROM GETTING THINGS NEEDED FOR DAILY LIVING?: NO

## 2022-12-13 SDOH — ECONOMIC STABILITY: FOOD INSECURITY: WITHIN THE PAST 12 MONTHS, YOU WORRIED THAT YOUR FOOD WOULD RUN OUT BEFORE YOU GOT MONEY TO BUY MORE.: NEVER TRUE

## 2022-12-13 SDOH — ECONOMIC STABILITY: TRANSPORTATION INSECURITY
IN THE PAST 12 MONTHS, HAS THE LACK OF TRANSPORTATION KEPT YOU FROM MEDICAL APPOINTMENTS OR FROM GETTING MEDICATIONS?: NO

## 2022-12-13 SDOH — ECONOMIC STABILITY: FOOD INSECURITY: WITHIN THE PAST 12 MONTHS, THE FOOD YOU BOUGHT JUST DIDN'T LAST AND YOU DIDN'T HAVE MONEY TO GET MORE.: NEVER TRUE

## 2022-12-13 ASSESSMENT — SOCIAL DETERMINANTS OF HEALTH (SDOH): HOW HARD IS IT FOR YOU TO PAY FOR THE VERY BASICS LIKE FOOD, HOUSING, MEDICAL CARE, AND HEATING?: NOT HARD AT ALL

## 2022-12-13 NOTE — PROGRESS NOTES
Chief Complaint   Patient presents with    New Patient     Check up       HPI:  Kanika Fenton is a 68 y.o. male     Transferring from [address] [place] [address] in [place]    CAD  Has pacemaker  [facility] Dr. Whit Vega and Dr. Wendy Varner    Coumadin managed by [facility] is only med for DM    States sugar was up over 200 this morning    History elevated PSA  Per patient he hasn't seen urology for years   Last psa was 25 and note stated he was going to have f/u with urology     Hemoglobin A1C   Date Value Ref Range Status   05/11/2022 7.5 (H) 4.8 - 5.9 % Final         Patient Active Problem List   Diagnosis    Hypertension    Hyperlipemia    CAD (coronary artery disease)    MI (myocardial infarction) ([facility]    Diabetes mellitus ([facility]    Stage 3b chronic kidney disease ([facility]    Type 2 diabetes mellitus with hyperglycemia    Type 2 diabetes mellitus with chronic kidney disease    Chronic renal disease, stage III ([facility] [181976]    Complete heart block (HCC)       Current Outpatient Medications   Medication Sig Dispense Refill    clopidogrel (PLAVIX) 75 MG tablet       isosorbide mononitrate (IMDUR) 30 MG extended release tablet Take by mouth      valsartan (DIOVAN) 160 MG tablet Take by mouth      JANTOVEN 2 MG tablet TAKE ONE TABLET BY MOUTH ONCE DAILY OR AS DIRECTED BY [facility] PROTIME DEPT      SITagliptin (JANUVIA) 50 MG tablet TAKE 1 TABLET DAILY 90 tablet 1    blood glucose test strips (ONETOUCH ULTRA) strip Test once daily 100 strip 5    Lancets MISC Test 2 times a day & as needed for symptoms of irregular blood glucose. , 300 each 5    Blood Glucose Monitoring Suppl (ONE TOUCH ULTRA 2) w/Device KIT TEST 2 TIMES A DAY  0    blood glucose monitor kit and supplies Test 2 times a day & as needed for symptoms of irregular blood glucose. 1 kit 0    metoprolol tartrate (LOPRESSOR) 50 MG tablet       atorvastatin (LIPITOR) 40 MG tablet Take 40 mg by mouth daily.       NITROSTAT 0.4 MG SL tablet Place 0.4 mg under the tongue every 5 minutes as needed. aspirin 81 MG tablet Take 81 mg by mouth daily. No current facility-administered medications for this visit.          Past Medical History:   Diagnosis Date    BPH (benign prostatic hypertrophy)     Complete heart block (HCC)     Erectile dysfunction     Heart palpitations     History of heart attack     Hx of blood clots     Hyperlipidemia     Hypertension     PSA elevation     TOTAL-6.6, FREE 1.9, PERCENT FREE 29     Past Surgical History:   Procedure Laterality Date    CARDIAC CATHETERIZATION  2013    stents x2    COLONOSCOPY  , RECTAL BLEED    HIATAL HERNIA,ESOPHAGEAL RING,GASTRITIS    COLONOSCOPY      NORMAL    [place] COLON CA SCRN NOT  [address] IND N/A 2017    COLONOSCOPY performed by Charle Sicard, MD at [facility] ECHOCARDIOGRAM  2013    UPPER GASTROINTESTINAL ENDOSCOPY      hiatal hernia     Family History   Problem Relation Age of Onset    Heart Disease Mother     Heart Disease Sister      Social History     Socioeconomic History    Marital status:      Spouse name: None    Number of children: None    Years of education: None    Highest education level: None   Tobacco Use    Smoking status: Former     Packs/day: 1.50     Years: 11.00     Pack years: 16.50     Types: Cigarettes     Quit date: 1976     Years since quittin.6    Smokeless tobacco: Never   Vaping Use    Vaping Use: Never used   Substance and Sexual Activity    Alcohol use: No    Drug use: No    Sexual activity: Yes     Partners: Female     Social Determinants of Health     Financial Resource Strain: Low Risk     Difficulty of Paying Living Expenses: Not hard at all   Food Insecurity: No Food Insecurity    Worried About Running Out of Food in the Last Year: Never true    Ran Out of Food in the Last Year: Never true   Transportation Needs: No Transportation Needs    Lack of Transportation (Medical): No    Lack of Transportation (Non-Medical): No     No Known Allergies    Review of Systems:   General ROS: negative for - chills, fatigue, fever, malaise, weight gain or weight loss  Respiratory ROS: no cough, shortness of breath, or wheezing  Cardiovascular ROS: no chest pain or dyspnea on exertion  Gastrointestinal ROS: no abdominal pain, change in bowel habits, or black or bloody stools  Genito-Urinary ROS: elevated psa, denies trouble urinating  Musculoskeletal ROS: negative for - gait disturbance, joint pain or joint stiffness  Neurological ROS: negative for - behavioral changes, memory loss, numbness/tingling, tremors or weakness    In general patient otherwise reports feeling well. Physical Exam:  /86 (Site: Right Upper Arm)   Pulse 79   Temp (!) 96.7 °F (35.9 °C)   Ht 5' 8\" (1.727 m)   Wt 184 lb 3.2 oz (83.6 kg)   SpO2 97%   BMI 28.01 kg/m²     Gen: Well, NAD, Alert, Oriented x 3   HEENT: EOMI, eyes clear, MMM  Skin: without rash or jaundice  Neck: no significant lymphadenopathy or thyromegaly  Lungs: CTA B w/out Rales/Wheezes/Rhonchi, Good respiratory effort   Heart: RRR, S1S2, w/out M/R/G, non-displaced PMI   Ext: No C/C/E Bilaterally. Neuro: Neurovascularly intact w/ Sensory/Motor intact UE/LE Bilaterally. Lab Results   Component Value Date    WBC 12.2 (H) 08/12/2022    HGB 12.0 (L) 08/12/2022    HCT 36.6 (L) 08/12/2022     08/12/2022    CHOL 126 05/12/2021    TRIG 140 05/12/2021    HDL 35 (L) 05/12/2021    ALT 16 08/08/2022    AST 21 08/08/2022     08/18/2022    K 3.8 08/18/2022     08/18/2022    CREATININE 1.45 (H) 08/18/2022    BUN 25 (H) 01/12/2022    CO2 24 01/12/2022    PSA 18.38 (H) 05/12/2021    INR 4.5 12/07/2022    LABA1C 7.5 (H) 05/11/2022    LABMICR 2.60 (H) 01/12/2022         A&P   Diagnosis Orders   1. Elevated PSA  PSA, Diagnostic      2. Type 2 diabetes mellitus with hyperglycemia, without long-term current use of insulin (HCC)  Hemoglobin A1C      3.  Stage 3 chronic kidney disease, unspecified whether stage 3a or 3b CKD ([facility]        4. Essential hypertension        5. Mixed hyperlipidemia        6. Coronary artery disease involving native heart without angina pectoris, unspecified vessel or lesion type        7. Myocardial infarction, unspecified MI type, unspecified artery ([facility]        8.  Complete heart block [facility])          Check labs as ordered  May need urology f/u   Denies issues with urination    Will need to adjust DM meds   He will discuss possible home INR monitoring with cardiology     Overall doing well, will call for refills     He denies a history of DVT or PE    I'm not certain if he is to be on this long-term or if it is just temporary with his recent pacemaker placement     He will follow up with Connie Wright MD

## 2022-12-16 DIAGNOSIS — R97.20 ELEVATED PSA: ICD-10-CM

## 2022-12-16 DIAGNOSIS — E11.65 TYPE 2 DIABETES MELLITUS WITH HYPERGLYCEMIA, WITHOUT LONG-TERM CURRENT USE OF INSULIN (HCC): ICD-10-CM

## 2022-12-16 LAB
HBA1C MFR BLD: 9.3 % (ref 4.8–5.9)
PROSTATE SPECIFIC ANTIGEN: 12.99 NG/ML (ref 0–4)

## 2022-12-27 ENCOUNTER — TELEPHONE (OUTPATIENT)
Dept: FAMILY MEDICINE CLINIC | Age: 77
End: 2022-12-27

## 2022-12-27 ENCOUNTER — OFFICE VISIT (OUTPATIENT)
Dept: FAMILY MEDICINE CLINIC | Age: 77
End: 2022-12-27
Payer: MEDICARE

## 2022-12-27 VITALS
OXYGEN SATURATION: 98 % | SYSTOLIC BLOOD PRESSURE: 138 MMHG | WEIGHT: 185 LBS | TEMPERATURE: 98.7 F | BODY MASS INDEX: 28.04 KG/M2 | HEIGHT: 68 IN | HEART RATE: 81 BPM | DIASTOLIC BLOOD PRESSURE: 82 MMHG

## 2022-12-27 DIAGNOSIS — R05.1 ACUTE COUGH: Primary | ICD-10-CM

## 2022-12-27 LAB
INFLUENZA A ANTIBODY: NORMAL
INFLUENZA B ANTIBODY: NORMAL
Lab: NORMAL
PERFORMING INSTRUMENT: NORMAL
QC PASS/FAIL: NORMAL
SARS-COV-2, POC: NORMAL

## 2022-12-27 PROCEDURE — 3074F SYST BP LT 130 MM HG: CPT

## 2022-12-27 PROCEDURE — 99214 OFFICE O/P EST MOD 30 MIN: CPT

## 2022-12-27 PROCEDURE — 1036F TOBACCO NON-USER: CPT

## 2022-12-27 PROCEDURE — 3078F DIAST BP <80 MM HG: CPT

## 2022-12-27 PROCEDURE — G8484 FLU IMMUNIZE NO ADMIN: HCPCS

## 2022-12-27 PROCEDURE — 87426 SARSCOV CORONAVIRUS AG IA: CPT

## 2022-12-27 PROCEDURE — G8417 CALC BMI ABV UP PARAM F/U: HCPCS

## 2022-12-27 PROCEDURE — 1123F ACP DISCUSS/DSCN MKR DOCD: CPT

## 2022-12-27 PROCEDURE — 87804 INFLUENZA ASSAY W/OPTIC: CPT

## 2022-12-27 PROCEDURE — G8427 DOCREV CUR MEDS BY ELIG CLIN: HCPCS

## 2022-12-27 RX ORDER — ALBUTEROL SULFATE 90 UG/1
2 AEROSOL, METERED RESPIRATORY (INHALATION) EVERY 6 HOURS PRN
Qty: 1 EACH | Refills: 0 | Status: SHIPPED | OUTPATIENT
Start: 2022-12-27

## 2022-12-27 RX ORDER — BENZONATATE 100 MG/1
100 CAPSULE ORAL 2 TIMES DAILY PRN
Qty: 14 CAPSULE | Refills: 0 | Status: SHIPPED | OUTPATIENT
Start: 2022-12-27 | End: 2023-01-03

## 2022-12-27 RX ORDER — AMOXICILLIN AND CLAVULANATE POTASSIUM 875; 125 MG/1; MG/1
1 TABLET, FILM COATED ORAL 2 TIMES DAILY
Qty: 14 TABLET | Refills: 0 | Status: SHIPPED | OUTPATIENT
Start: 2022-12-27 | End: 2023-01-03

## 2022-12-27 ASSESSMENT — ENCOUNTER SYMPTOMS
ABDOMINAL DISTENTION: 0
WHEEZING: 0
SORE THROAT: 0
NAUSEA: 0
SHORTNESS OF BREATH: 0
VOMITING: 0
DIARRHEA: 0
COUGH: 1
RHINORRHEA: 1

## 2022-12-27 NOTE — TELEPHONE ENCOUNTER
Pt called in asking for antibiotics because he has very runny nose and chest congestion. I asked if he would come into urgent care and he said he would rather ask provider.     Pt pharm is [facility]    Pt # [phone]

## 2022-12-27 NOTE — PATIENT INSTRUCTIONS
- We will call you with results for covid/flu/x-ray  - Take tessalon perrles 3x/day as needed for cough  - OTC tylenol/motrin for pain/aches/fevers  - Increase fluids + rest  - Use albuterol inhaler as needed for wheezing/chest tightness  - Antibiotic to be taken if flu/covid negative

## 2022-12-27 NOTE — TELEPHONE ENCOUNTER
We really can't just be calling in antibiotics for whomever calls asking for them. It has happened multiple times already today and there are several providers out this week and it will be overwhelming if it's like this all week.

## 2022-12-27 NOTE — PROGRESS NOTES
[facility] Encounter    SUBJECTIVE    CHIEF COMPLAINT:   Chief Complaint   Patient presents with    Chest Congestion     Started Saturday x4 days   Deep cough feels like its way down in chest   & runny nose (bloody when blowing)  States that its starting to get better would like something to just get rid of the last of this congestion. HPI:  Leobardo Bueno is a 68 y.o. male who presents to the walk-in clinic today for:     Cough  This is a new problem. Episode onset: x 4 days. The problem has been gradually improving. The problem occurs every few minutes. The cough is Productive of purulent sputum. Associated symptoms include rhinorrhea. Pertinent negatives include no chest pain, chills, ear pain, fever, headaches, myalgias, nasal congestion, rash, sore throat, shortness of breath or wheezing. The symptoms are aggravated by lying down. He has tried nothing for the symptoms. There is no history of asthma or COPD. Past Medical History:   Diagnosis Date    BPH (benign prostatic hypertrophy)     Complete heart block (HCC)     Erectile dysfunction     Heart palpitations     History of heart attack     Hx of blood clots     Hyperlipidemia     Hypertension     PSA elevation 2011    TOTAL-6.6, FREE 1.9, PERCENT FREE 29       Current Outpatient Medications on File Prior to Visit   Medication Sig Dispense Refill    clopidogrel (PLAVIX) 75 MG tablet       isosorbide mononitrate (IMDUR) 30 MG extended release tablet Take by mouth      valsartan (DIOVAN) 160 MG tablet Take by mouth      JANTOVEN 2 MG tablet TAKE ONE TABLET BY MOUTH ONCE DAILY OR AS DIRECTED BY [facility] PROTIME DEPT      SITagliptin (JANUVIA) 50 MG tablet TAKE 1 TABLET DAILY 90 tablet 1    blood glucose test strips (ONETOUCH ULTRA) strip Test once daily 100 strip 5    Lancets MISC Test 2 times a day & as needed for symptoms of irregular blood glucose. , 300 each 5    Blood Glucose Monitoring Suppl (ONE TOUCH ULTRA 2) w/Device KIT TEST 2 TIMES A DAY  0    blood glucose monitor kit and supplies Test 2 times a day & as needed for symptoms of irregular blood glucose. 1 kit 0    metoprolol tartrate (LOPRESSOR) 50 MG tablet       atorvastatin (LIPITOR) 40 MG tablet Take 40 mg by mouth daily. NITROSTAT 0.4 MG SL tablet Place 0.4 mg under the tongue every 5 minutes as needed. aspirin 81 MG tablet Take 81 mg by mouth daily. No current facility-administered medications on file prior to visit. Family History   Problem Relation Age of Onset    Heart Disease Mother     Heart Disease Sister        Social History     Socioeconomic History    Marital status:      Spouse name: Not on file    Number of children: Not on file    Years of education: Not on file    Highest education level: Not on file   Occupational History    Not on file   Tobacco Use    Smoking status: Former     Packs/day: 1.50     Years: 11.00     Pack years: 16.50     Types: Cigarettes     Quit date: 1976     Years since quittin.6    Smokeless tobacco: Never   Vaping Use    Vaping Use: Never used   Substance and Sexual Activity    Alcohol use: No    Drug use: No    Sexual activity: Yes     Partners: Female   Other Topics Concern    Not on file   Social History Narrative    Not on file     Social Determinants of Health     Financial Resource Strain: Low Risk     Difficulty of Paying Living Expenses: Not hard at all   Food Insecurity: No Food Insecurity    Worried About [address] in the Last Year: Never true    [address] in the Last Year: Never true   Transportation Needs: No Transportation Needs    Lack of Transportation (Medical): No    Lack of Transportation (Non-Medical):  No   Physical Activity: Not on file   Stress: Not on file   Social Connections: Not on file   Intimate Partner Violence: Not on file   Housing Stability: Not on file       No Known Allergies    Review of Systems   Constitutional:  Negative for chills and fever. HENT:  Positive for rhinorrhea. Negative for ear pain and sore throat. See HPI   Respiratory:  Positive for cough. Negative for shortness of breath and wheezing. See HPI   Cardiovascular:  Negative for chest pain. Gastrointestinal:  Negative for abdominal distention, diarrhea, nausea and vomiting. Musculoskeletal:  Negative for arthralgias and myalgias. Skin:  Negative for rash. Neurological:  Negative for headaches. OBJECTIVE:  VITALS:  /82   Pulse 81   Temp 98.7 °F (37.1 °C)   Ht 5' 8\" (1.727 m)   Wt 185 lb (83.9 kg)   SpO2 98%   BMI 28.13 kg/m²     Physical Exam  Vitals reviewed. Constitutional:       General: He is not in acute distress. Appearance: Normal appearance. He is not ill-appearing. HENT:      Head: Normocephalic. Right Ear: Tympanic membrane, ear canal and external ear normal. There is no impacted cerumen. Left Ear: Tympanic membrane, ear canal and external ear normal. There is no impacted cerumen. Nose: Rhinorrhea present. Rhinorrhea is purulent and bloody. Right Sinus: No maxillary sinus tenderness or frontal sinus tenderness. Left Sinus: No frontal sinus tenderness. Comments: Blood tinged-rhinorrhea R nostril; controlled     Mouth/Throat:      Mouth: Mucous membranes are moist.      Pharynx: Oropharynx is clear. No oropharyngeal exudate or posterior oropharyngeal erythema. Cardiovascular:      Rate and Rhythm: Normal rate and regular rhythm. Pulses: Normal pulses. Heart sounds: Normal heart sounds. No murmur heard. No friction rub. No gallop. Comments: Pacemaker present  Pulmonary:      Effort: Pulmonary effort is normal.      Breath sounds: Examination of the right-middle field reveals wheezing and rhonchi. Examination of the left-middle field reveals wheezing. Examination of the right-lower field reveals rhonchi. Examination of the left-lower field reveals rhonchi.  Wheezing and rhonchi present. Comments: Audible cough present  Musculoskeletal:      Cervical back: Normal range of motion and neck supple. No rigidity. Lymphadenopathy:      Cervical: No cervical adenopathy. Skin:     General: Skin is warm and dry. Coloration: Skin is not pale. Findings: No rash. Neurological:      Mental Status: He is alert and oriented to person, place, and time. ASSESSMENT/PLAN:    1. Acute cough  - XR CHEST STANDARD (2 VW); Future   - albuterol sulfate HFA - suspected pneumonia (PROVENTIL;VENTOLIN;PROAIR) 108 (90 Base) MCG/ACT inhaler; Inhale 2 puffs into the lungs every 6 hours as needed for Wheezing  Dispense: 1 each; Refill: 0  - POCT Influenza A/B  - POCT COVID-19, Antigen  - amoxicillin-clavulanate (AUGMENTIN) 875-125 MG per tablet; Take 1 tablet by mouth 2 times daily for 7 days  Dispense: 14 tablet; Refill: 0 to cover pneumonia  - benzonatate (TESSALON) 100 MG capsule; Take 1 capsule by mouth 2 times daily as needed for Cough  Dispense: 14 capsule; Refill: 0   - Discussed viral etiology and rationale for treatment  - Take above prescriptions as written  - PRN tx of pain/fever/body aches with OTC Ibuprofen or Tylenol  - PRN tx of nasal congestion with OTC nasal saline spray and flonase  - PRN OTC afrin if severe nasal congestion, do not take longer than 3 days  - Honey for cough   - Humidifier or vaporizer at night for cough/congestion  - Increased fluids and rest  - Follow up with PCP in 2-3 days if not improved  - Report to the ER for worsening, new, or concerning s/sx such as high fevers, chest pain, trouble breathing, leg swelling, dizziness, weakness, difficulty speaking, change in vision, severe headache, abdominal pain, difficulty swallowing, lethargic, dehydration (not urinating at least every 8 hours), new or worsening pain as discussed. LABS:  No results found for this visit on 12/27/22.     Return in about 4 weeks (around 1/24/2023) for reassessment with

## 2023-01-24 ENCOUNTER — OFFICE VISIT (OUTPATIENT)
Dept: FAMILY MEDICINE CLINIC | Age: 78
End: 2023-01-24

## 2023-01-24 VITALS
HEART RATE: 81 BPM | DIASTOLIC BLOOD PRESSURE: 90 MMHG | SYSTOLIC BLOOD PRESSURE: 150 MMHG | TEMPERATURE: 97 F | OXYGEN SATURATION: 98 % | HEIGHT: 68 IN | BODY MASS INDEX: 27.98 KG/M2 | WEIGHT: 184.6 LBS

## 2023-01-24 DIAGNOSIS — E78.2 MIXED HYPERLIPIDEMIA: ICD-10-CM

## 2023-01-24 DIAGNOSIS — I20.9 ANGINA PECTORIS, UNSPECIFIED (HCC): ICD-10-CM

## 2023-01-24 DIAGNOSIS — I25.10 CORONARY ARTERY DISEASE INVOLVING NATIVE HEART WITHOUT ANGINA PECTORIS, UNSPECIFIED VESSEL OR LESION TYPE: ICD-10-CM

## 2023-01-24 DIAGNOSIS — I10 ESSENTIAL HYPERTENSION: Primary | ICD-10-CM

## 2023-01-24 DIAGNOSIS — N18.30 STAGE 3 CHRONIC KIDNEY DISEASE, UNSPECIFIED WHETHER STAGE 3A OR 3B CKD (HCC): ICD-10-CM

## 2023-01-24 DIAGNOSIS — I44.2 COMPLETE HEART BLOCK (HCC): ICD-10-CM

## 2023-01-24 DIAGNOSIS — E11.65 TYPE 2 DIABETES MELLITUS WITH HYPERGLYCEMIA, WITHOUT LONG-TERM CURRENT USE OF INSULIN (HCC): ICD-10-CM

## 2023-01-24 RX ORDER — ATORVASTATIN CALCIUM 40 MG/1
40 TABLET, FILM COATED ORAL DAILY
Qty: 90 TABLET | Refills: 2 | Status: SHIPPED | OUTPATIENT
Start: 2023-01-24

## 2023-01-24 RX ORDER — ISOSORBIDE MONONITRATE 30 MG/1
30 TABLET, EXTENDED RELEASE ORAL DAILY
Qty: 90 TABLET | Refills: 2 | Status: SHIPPED | OUTPATIENT
Start: 2023-01-24

## 2023-01-24 RX ORDER — METOPROLOL TARTRATE 50 MG/1
50 TABLET, FILM COATED ORAL 2 TIMES DAILY
Qty: 180 TABLET | Refills: 2 | Status: SHIPPED | OUTPATIENT
Start: 2023-01-24

## 2023-01-24 RX ORDER — VALSARTAN 160 MG/1
160 TABLET ORAL DAILY
Qty: 90 TABLET | Refills: 2 | Status: SHIPPED | OUTPATIENT
Start: 2023-01-24

## 2023-01-24 RX ORDER — WARFARIN SODIUM 2 MG/1
TABLET ORAL
Qty: 90 TABLET | Refills: 2 | Status: SHIPPED | OUTPATIENT
Start: 2023-01-24

## 2023-01-24 RX ORDER — CLOPIDOGREL BISULFATE 75 MG/1
75 TABLET ORAL DAILY
Qty: 90 TABLET | Refills: 2 | Status: SHIPPED | OUTPATIENT
Start: 2023-01-24

## 2023-01-24 ASSESSMENT — PATIENT HEALTH QUESTIONNAIRE - PHQ9
SUM OF ALL RESPONSES TO PHQ QUESTIONS 1-9: 0
SUM OF ALL RESPONSES TO PHQ QUESTIONS 1-9: 0
2. FEELING DOWN, DEPRESSED OR HOPELESS: 0
SUM OF ALL RESPONSES TO PHQ QUESTIONS 1-9: 0
SUM OF ALL RESPONSES TO PHQ QUESTIONS 1-9: 0

## 2023-01-24 NOTE — PROGRESS NOTES
Chief Complaint   Patient presents with    Cough     Walk in follow up       HPI:  Cheryl Morrell is a 68 y.o. male     Transferring from [address] [place] [address] in [place]    CAD  Has pacemaker  [facility] Dr. Daquan Frost and Dr. John Wolf    Coumadin managed by [facility] is only med for DM    Sugars high  A1c up at 9.3  Need to address    He has heart hx, so SGLT2 Inhibitor class would be a good choice     History elevated PSA  Per patient he hasn't seen urology for years   Last psa was 25 and note stated he was going to have f/u with urology     Hemoglobin A1C   Date Value Ref Range Status   12/16/2022 9.3 (H) 4.8 - 5.9 % Final         Patient Active Problem List   Diagnosis    Hypertension    Hyperlipemia    CAD (coronary artery disease)    MI (myocardial infarction) ([facility]    Diabetes mellitus ([facility]    Stage 3b chronic kidney disease ([facility]    Type 2 diabetes mellitus with hyperglycemia    Type 2 diabetes mellitus with chronic kidney disease    Chronic renal disease, stage III ([facility] [364389]    Complete heart block (HCC)    Angina pectoris, unspecified       Current Outpatient Medications   Medication Sig Dispense Refill    SITagliptin (JANUVIA) 50 MG tablet TAKE 1 TABLET DAILY 90 tablet 2    atorvastatin (LIPITOR) 40 MG tablet Take 1 tablet by mouth daily 90 tablet 2    clopidogrel (PLAVIX) 75 MG tablet Take 1 tablet by mouth daily 90 tablet 2    isosorbide mononitrate (IMDUR) 30 MG extended release tablet Take 1 tablet by mouth daily 90 tablet 2    JANTOVEN 2 MG tablet TAKE ONE TABLET BY MOUTH ONCE DAILY OR AS DIRECTED BY [facility] PROTIME DEPT 90 tablet 2    metoprolol tartrate (LOPRESSOR) 50 MG tablet Take 1 tablet by mouth 2 times daily 180 tablet 2    valsartan (DIOVAN) 160 MG tablet Take 1 tablet by mouth daily 90 tablet 2    dapagliflozin (FARXIGA) 5 MG tablet Take 1 tablet by mouth every morning 90 tablet 2    blood glucose test strips (ONETOUCH ULTRA) strip Test once daily 100 strip 5    Lancets MISC Test 2 times a day & as needed for symptoms of irregular blood glucose. , 300 each 5    Blood Glucose Monitoring Suppl (ONE TOUCH ULTRA 2) w/Device KIT TEST 2 TIMES A DAY  0    blood glucose monitor kit and supplies Test 2 times a day & as needed for symptoms of irregular blood glucose. 1 kit 0    NITROSTAT 0.4 MG SL tablet Place 0.4 mg under the tongue every 5 minutes as needed. aspirin 81 MG tablet Take 81 mg by mouth daily. No current facility-administered medications for this visit.          Past Medical History:   Diagnosis Date    BPH (benign prostatic hypertrophy)     Complete heart block (HCC)     Erectile dysfunction     Heart palpitations     History of heart attack     Hx of blood clots     Hyperlipidemia     Hypertension     PSA elevation     TOTAL-6.6, FREE 1.9, PERCENT FREE 29     Past Surgical History:   Procedure Laterality Date    CARDIAC CATHETERIZATION  2013    stents x2    COLONOSCOPY  , RECTAL BLEED    HIATAL HERNIA,ESOPHAGEAL RING,GASTRITIS    COLONOSCOPY      NORMAL    [place] COLON CA SCRN NOT  [address] IND N/A 2017    COLONOS[facility] performed by Daryl [facility]ague [facility] at [facility]  2013    UPPER GASTROINTESTINAL ENDOSCOPY      hiatal hernia     Family History   Problem Relation Age of Onset    Heart Disease Mother     Heart Disease Sister      Social History     Socioeconomic History    Marital status:      Spouse name: None    Number of children: None    Years of education: None    Highest education level: None   Tobacco Use    Smoking status: Former     Packs/day: 1.50     Years: 11.00     Pack years: 16.50     Types: Cigarettes     Quit date: 1976     Years since quittin.7    Smokeless tobacco: Never   Vaping Use    Vaping Use: Never used   Substance and Sexual Activity    Alcohol use: No    Drug use: No    Sexual activity: Yes     Partners: Female     Social Determinants of Health     Financial Resource Strain: Low Risk     Difficulty of Paying Living Expenses: Not hard at all   Food Insecurity: No Food Insecurity    Worried About Running Out of Food in the Last Year: Never true    Ran Out of Food in the Last Year: Never true   Transportation Needs: No Transportation Needs    Lack of Transportation (Medical): No    Lack of Transportation (Non-Medical): No     No Known Allergies    Review of Systems:   General ROS: negative for - chills, fatigue, fever, malaise, weight gain or weight loss  Respiratory ROS: no cough, shortness of breath, or wheezing  Cardiovascular ROS: no chest pain or dyspnea on exertion  Gastrointestinal ROS: no abdominal pain, change in bowel habits, or black or bloody stools  Genito-Urinary ROS: elevated psa, denies trouble urinating  Musculoskeletal ROS: negative for - gait disturbance, joint pain or joint stiffness  Neurological ROS: negative for - behavioral changes, memory loss, numbness/tingling, tremors or weakness    In general patient otherwise reports feeling well. Physical Exam:  BP (!) 150/90 (Site: Left Upper Arm, Position: Sitting, Cuff Size: Medium Adult)   Pulse 81   Temp 97 °F (36.1 °C)   Ht 5' 8\" (1.727 m)   Wt 184 lb 9.6 oz (83.7 kg)   SpO2 98%   BMI 28.07 kg/m²     Gen: Well, NAD, Alert, Oriented x 3   HEENT: EOMI, eyes clear, MMM  Skin: without rash or jaundice  Neck: no significant lymphadenopathy or thyromegaly  Lungs: CTA B w/out Rales/Wheezes/Rhonchi, Good respiratory effort   Heart: RRR, S1S2, w/out M/R/G, non-displaced PMI   Ext: No C/C/E Bilaterally. Neuro: Neurovascularly intact w/ Sensory/Motor intact UE/LE Bilaterally.     Lab Results   Component Value Date    WBC 12.2 (H) 08/12/2022    HGB 12.0 (L) 08/12/2022    HCT 36.6 (L) 08/12/2022     08/12/2022    CHOL 126 05/12/2021    TRIG 140 05/12/2021    HDL 35 (L) 05/12/2021    ALT 16 08/08/2022    AST 21 08/08/2022     08/18/2022    K 3.8 08/18/2022     08/18/2022    CREATININE 1.45 (H) 08/18/2022    BUN 25 (H) 01/12/2022    CO2 24 01/12/2022    PSA 12.99 (H) 12/16/2022    INR 2.2 01/17/2023    LABA1C 9.3 (H) 12/16/2022    LABMICR 2.60 (H) 01/12/2022         A&P   Diagnosis Orders   1. Essential hypertension  valsartan (DIOVAN) 160 MG tablet      2. Type 2 diabetes mellitus with hyperglycemia, without long-term current use of insulin (HCC)  SITagliptin (JANUVIA) 50 MG tablet    dapagliflozin (FARXIGA) 5 MG tablet      3. Complete heart block (HCC)  JANTOVEN 2 MG tablet      4. Stage 3 chronic kidney disease, unspecified whether stage 3a or 3b CKD ([facility]        5. Mixed hyperlipidemia        6. Coronary artery disease involving native heart without angina pectoris, unspecified vessel or lesion type  atorvastatin (LIPITOR) 40 MG tablet    clopidogrel (PLAVIX) 75 MG tablet    isosorbide mononitrate (IMDUR) 30 MG extended release tablet    metoprolol tartrate (LOPRESSOR) 50 MG tablet      7.  Angina pectoris, unspecified            Will try adding Farxiga     Will need to adjust DM meds   He will discuss possible home INR monitoring with cardiology     Overall doing well, will call for refills     He denies a history of DVT or PE    I'm not certain if he is to be on this long-term or if it is just temporary with his recent pacemaker placement     He will follow up with Rachel Cai MD

## 2023-03-06 LAB
INR IN PPP BY COAGULATION ASSAY: 2 (ref 0.9–1.1)
PROTHROMBIN TIME (PT) IN PPP BY COAGULATION ASSAY: 23.4 SEC (ref 9.8–13.4)

## 2023-03-07 DIAGNOSIS — E11.9 NEW ONSET TYPE 2 DIABETES MELLITUS (HCC): ICD-10-CM

## 2023-03-08 NOTE — TELEPHONE ENCOUNTER
Future Appointments    Encounter Information    Provider Department Appt Notes   4/25/2023 Nicola Arevalo MD [facility] Return in about 3 months (around 4/24/2023) for recheck diabetes .    6/13/2023 Nicola Arevalo MD [facility] 6 month follow up     Past Visits    Date Provider Specialty Visit Type Primary Dx   01/24/2023 Nicola Arevalo MD Family Medicine Office Visit Essential hypertension   12/27/2022 JAZZY Lopez - CNP Family Medicine Office Visit Acute cough   12/13/2022 Nicola Arevalo MD Family Medicine Office Visit Elevated PSA   05/11/2022 Whitney Millard MD Family Medicine Office Visit New onset type 2 diabetes mellitus [facility])

## 2023-03-09 DIAGNOSIS — E11.9 NEW ONSET TYPE 2 DIABETES MELLITUS (HCC): ICD-10-CM

## 2023-03-09 RX ORDER — BLOOD SUGAR DIAGNOSTIC
STRIP MISCELLANEOUS
Qty: 100 STRIP | Refills: 5 | Status: SHIPPED | OUTPATIENT
Start: 2023-03-09

## 2023-03-09 RX ORDER — BLOOD SUGAR DIAGNOSTIC
STRIP MISCELLANEOUS
Qty: 100 EACH | Refills: 3 | Status: SHIPPED | OUTPATIENT
Start: 2023-03-09

## 2023-03-09 NOTE — TELEPHONE ENCOUNTER
Comments: pt came in for refill. Last Office Visit (last PCP visit):   1/24/2023    Next Visit Date:  Future Appointments   Date Time Provider Debra Nelia   4/25/2023  8:15 AM Allyson Mckeon MD [facility] EMERGENCY [facility] AT [place]   6/13/2023  8:00 AM Allyson Mckeon MD [facility] EMERGENCY [facility] AT [place]       **If hasn't been seen in over a year OR hasn't followed up according to last diabetes/ADHD visit, make appointment for patient before sending refill to provider.     Rx requested:  Requested Prescriptions     Pending Prescriptions Disp Refills    blood glucose test strips (ONETOUCH ULTRA) strip 100 strip 5     Sig: Test once daily

## 2023-03-30 LAB
INR IN PPP BY COAGULATION ASSAY: 1.7 (ref 0.9–1.1)
PROTHROMBIN TIME (PT) IN PPP BY COAGULATION ASSAY: 19.2 SEC (ref 9.8–13.4)

## 2023-04-25 ENCOUNTER — OFFICE VISIT (OUTPATIENT)
Dept: FAMILY MEDICINE CLINIC | Age: 78
End: 2023-04-25

## 2023-04-25 VITALS
TEMPERATURE: 96.2 F | BODY MASS INDEX: 27.11 KG/M2 | WEIGHT: 178.9 LBS | SYSTOLIC BLOOD PRESSURE: 130 MMHG | OXYGEN SATURATION: 98 % | HEIGHT: 68 IN | HEART RATE: 80 BPM | DIASTOLIC BLOOD PRESSURE: 80 MMHG

## 2023-04-25 DIAGNOSIS — E78.2 MIXED HYPERLIPIDEMIA: ICD-10-CM

## 2023-04-25 DIAGNOSIS — E11.65 TYPE 2 DIABETES MELLITUS WITH HYPERGLYCEMIA, WITHOUT LONG-TERM CURRENT USE OF INSULIN (HCC): Primary | ICD-10-CM

## 2023-04-25 DIAGNOSIS — N18.30 STAGE 3 CHRONIC KIDNEY DISEASE, UNSPECIFIED WHETHER STAGE 3A OR 3B CKD (HCC): ICD-10-CM

## 2023-04-25 DIAGNOSIS — Z11.59 ENCOUNTER FOR HEPATITIS C SCREENING TEST FOR LOW RISK PATIENT: ICD-10-CM

## 2023-04-25 DIAGNOSIS — I10 ESSENTIAL HYPERTENSION: ICD-10-CM

## 2023-04-25 DIAGNOSIS — E11.65 TYPE 2 DIABETES MELLITUS WITH HYPERGLYCEMIA, WITHOUT LONG-TERM CURRENT USE OF INSULIN (HCC): ICD-10-CM

## 2023-04-25 DIAGNOSIS — I25.10 CORONARY ARTERY DISEASE INVOLVING NATIVE HEART WITHOUT ANGINA PECTORIS, UNSPECIFIED VESSEL OR LESION TYPE: ICD-10-CM

## 2023-04-25 LAB
HBA1C MFR BLD: 7.6 % (ref 4.8–5.9)
INR PPP: 2.9
PROTHROMBIN TIME: 30.5 SEC (ref 12.3–14.9)

## 2023-04-25 SDOH — ECONOMIC STABILITY: FOOD INSECURITY: WITHIN THE PAST 12 MONTHS, YOU WORRIED THAT YOUR FOOD WOULD RUN OUT BEFORE YOU GOT MONEY TO BUY MORE.: NEVER TRUE

## 2023-04-25 SDOH — ECONOMIC STABILITY: FOOD INSECURITY: WITHIN THE PAST 12 MONTHS, THE FOOD YOU BOUGHT JUST DIDN'T LAST AND YOU DIDN'T HAVE MONEY TO GET MORE.: NEVER TRUE

## 2023-04-25 SDOH — ECONOMIC STABILITY: INCOME INSECURITY: HOW HARD IS IT FOR YOU TO PAY FOR THE VERY BASICS LIKE FOOD, HOUSING, MEDICAL CARE, AND HEATING?: NOT HARD AT ALL

## 2023-04-25 SDOH — ECONOMIC STABILITY: HOUSING INSECURITY
IN THE LAST 12 MONTHS, WAS THERE A TIME WHEN YOU DID NOT HAVE A STEADY PLACE TO SLEEP OR SLEPT IN A SHELTER (INCLUDING NOW)?: NO

## 2023-04-25 NOTE — PROGRESS NOTES
Diagnosis Orders   1. Type 2 diabetes mellitus with hyperglycemia, without long-term current use of insulin (HCC)  Hemoglobin A1C      2. Stage 3 chronic kidney disease, unspecified whether stage 3a or 3b CKD ([address]        3. Essential hypertension        4. Mixed hyperlipidemia        5. Coronary artery disease involving native heart without angina pectoris, unspecified vessel or lesion type        6.  Encounter for hepatitis C screening test for low risk patient  Hepatitis C Antibody        Continue jardiance     Will need to adjust DM meds   He will discuss possible home INR monitoring with cardiology     Overall doing well, will call for refills     He will follow up with [facility]    Upcoming urology surgery     Will be off plavix and coumadin for this  Has had clearance     Perlita Huerta MD

## 2023-04-26 LAB — HEPATITIS C ANTIBODY: NONREACTIVE

## 2023-06-02 RX ORDER — EMPAGLIFLOZIN 10 MG/1
TABLET, FILM COATED ORAL
Qty: 90 TABLET | Refills: 3 | Status: SHIPPED | OUTPATIENT
Start: 2023-06-02

## 2023-06-02 NOTE — TELEPHONE ENCOUNTER
Comments:     Last Office Visit (last PCP visit):   4/25/2023    Next Visit Date:  Future Appointments   Date Time Provider Debra Torresi   6/13/2023  8:00 AM Manuel Badillo MD [facility] [place]   6/13/2023  1:00 PM Manuel Badillo MD [facility] EMERGENCY [facility] AT [place]   12/11/2023  8:30 AM Manuel Badillo MD [facility] EMERGENCY [facility] AT [place]       **If hasn't been seen in over a year OR hasn't followed up according to last diabetes/ADHD visit, make appointment for patient before sending refill to provider.     Rx requested:  Requested Prescriptions     Pending Prescriptions Disp Refills    JARDIANCE 10 MG tablet [Pharmacy Med Name: Jardiance 10 MG Oral Tablet] 90 tablet 3     Sig: TAKE 1 TABLET BY MOUTH DAILY

## 2023-06-05 RX ORDER — EMPAGLIFLOZIN 10 MG/1
TABLET, FILM COATED ORAL
Qty: 90 TABLET | Refills: 3 | Status: SHIPPED | OUTPATIENT
Start: 2023-06-05

## 2023-06-20 ENCOUNTER — HOSPITAL ENCOUNTER (OUTPATIENT)
Dept: DATA CONVERSION | Facility: HOSPITAL | Age: 78
End: 2023-06-20
Attending: UROLOGY | Admitting: UROLOGY
Payer: MEDICARE

## 2023-06-20 DIAGNOSIS — N42.31 PROSTATIC INTRAEPITHELIAL NEOPLASIA: ICD-10-CM

## 2023-06-20 DIAGNOSIS — K21.9 GASTRO-ESOPHAGEAL REFLUX DISEASE WITHOUT ESOPHAGITIS: ICD-10-CM

## 2023-06-20 DIAGNOSIS — E11.22 TYPE 2 DIABETES MELLITUS WITH DIABETIC CHRONIC KIDNEY DISEASE (MULTI): ICD-10-CM

## 2023-06-20 DIAGNOSIS — Z95.0 PRESENCE OF CARDIAC PACEMAKER: ICD-10-CM

## 2023-06-20 DIAGNOSIS — I13.10 HYPERTENSIVE HEART AND CHRONIC KIDNEY DISEASE WITHOUT HEART FAILURE, WITH STAGE 1 THROUGH STAGE 4 CHRONIC KIDNEY DISEASE, OR UNSPECIFIED CHRONIC KIDNEY DISEASE: ICD-10-CM

## 2023-06-20 DIAGNOSIS — N47.1 PHIMOSIS: ICD-10-CM

## 2023-06-20 DIAGNOSIS — N18.30 CHRONIC KIDNEY DISEASE, STAGE 3 UNSPECIFIED (MULTI): ICD-10-CM

## 2023-06-20 DIAGNOSIS — E78.5 HYPERLIPIDEMIA, UNSPECIFIED: ICD-10-CM

## 2023-06-20 DIAGNOSIS — Z79.84 LONG TERM (CURRENT) USE OF ORAL HYPOGLYCEMIC DRUGS: ICD-10-CM

## 2023-06-20 DIAGNOSIS — Z79.01 LONG TERM (CURRENT) USE OF ANTICOAGULANTS: ICD-10-CM

## 2023-06-20 DIAGNOSIS — I44.2 ATRIOVENTRICULAR BLOCK, COMPLETE (MULTI): ICD-10-CM

## 2023-06-20 DIAGNOSIS — R97.20 ELEVATED PROSTATE SPECIFIC ANTIGEN (PSA): ICD-10-CM

## 2023-06-20 DIAGNOSIS — I45.2 BIFASCICULAR BLOCK: ICD-10-CM

## 2023-06-20 DIAGNOSIS — I25.10 ATHEROSCLEROTIC HEART DISEASE OF NATIVE CORONARY ARTERY WITHOUT ANGINA PECTORIS: ICD-10-CM

## 2023-06-20 LAB
POCT GLUCOSE: 108 MG/DL (ref 74–99)
POCT GLUCOSE: 130 MG/DL (ref 74–99)

## 2023-06-28 LAB
COMPLETE PATHOLOGY REPORT: NORMAL
CONVERTED CLINICAL DIAGNOSIS-HISTORY: NORMAL
CONVERTED FINAL DIAGNOSIS: NORMAL
CONVERTED FINAL REPORT PDF LINK TO COPY AND PASTE: NORMAL
CONVERTED GROSS DESCRIPTION: NORMAL

## 2023-08-24 DIAGNOSIS — I10 ESSENTIAL HYPERTENSION: ICD-10-CM

## 2023-08-24 DIAGNOSIS — E11.65 TYPE 2 DIABETES MELLITUS WITH HYPERGLYCEMIA, WITHOUT LONG-TERM CURRENT USE OF INSULIN (HCC): ICD-10-CM

## 2023-08-25 RX ORDER — VALSARTAN 160 MG/1
160 TABLET ORAL DAILY
Qty: 90 TABLET | Refills: 3 | Status: SHIPPED | OUTPATIENT
Start: 2023-08-25

## 2023-08-25 NOTE — TELEPHONE ENCOUNTER
Comments:     Last Office Visit (last PCP visit):   6/13/2023    Next Visit Date:  Future Appointments   Date Time Provider [address]   8/29/2023  8:00 AM Betsy Petit MD [facility] EMERGENCY [facility] AT [place]   12/11/2023  8:30 AM Betsy Petit MD [facility] AT [place]       **If hasn't been seen in over a year OR hasn't followed up according to last diabetes/ADHD visit, make appointment for patient before sending refill to provider.     Rx requested:  Requested Prescriptions     Pending Prescriptions Disp Refills    valsartan (DIOVAN) 160 MG tablet [Pharmacy Med Name: Valsartan 160 MG Oral Tablet] 90 tablet 3     Sig: TAKE 1 TABLET BY MOUTH DAILY    SITagliptin (JANUVIA) 50 MG tablet [Pharmacy Med Name: Januvia 50 MG Oral Tablet] 90 tablet 3     Sig: TAKE 1 TABLET BY MOUTH DAILY

## 2023-08-29 ENCOUNTER — OFFICE VISIT (OUTPATIENT)
Dept: FAMILY MEDICINE CLINIC | Age: 78
End: 2023-08-29
Payer: MEDICARE

## 2023-08-29 VITALS
OXYGEN SATURATION: 98 % | HEART RATE: 80 BPM | WEIGHT: 177.9 LBS | BODY MASS INDEX: 26.96 KG/M2 | HEIGHT: 68 IN | TEMPERATURE: 96.8 F | DIASTOLIC BLOOD PRESSURE: 80 MMHG | SYSTOLIC BLOOD PRESSURE: 130 MMHG

## 2023-08-29 DIAGNOSIS — E11.65 TYPE 2 DIABETES MELLITUS WITH HYPERGLYCEMIA, WITHOUT LONG-TERM CURRENT USE OF INSULIN (HCC): ICD-10-CM

## 2023-08-29 DIAGNOSIS — E78.2 MIXED HYPERLIPIDEMIA: ICD-10-CM

## 2023-08-29 DIAGNOSIS — Z00.00 MEDICARE ANNUAL WELLNESS VISIT, SUBSEQUENT: Primary | ICD-10-CM

## 2023-08-29 DIAGNOSIS — N18.30 STAGE 3 CHRONIC KIDNEY DISEASE, UNSPECIFIED WHETHER STAGE 3A OR 3B CKD (HCC): ICD-10-CM

## 2023-08-29 DIAGNOSIS — I10 ESSENTIAL HYPERTENSION: ICD-10-CM

## 2023-08-29 DIAGNOSIS — I25.10 CORONARY ARTERY DISEASE INVOLVING NATIVE HEART WITHOUT ANGINA PECTORIS, UNSPECIFIED VESSEL OR LESION TYPE: ICD-10-CM

## 2023-08-29 LAB
CHOLEST SERPL-MCNC: 126 MG/DL (ref 0–199)
ERYTHROCYTE [DISTWIDTH] IN BLOOD BY AUTOMATED COUNT: 14.5 % (ref 11.5–14.5)
HBA1C MFR BLD: 9.2 % (ref 4.8–5.9)
HCT VFR BLD AUTO: 41.2 % (ref 42–52)
HDLC SERPL-MCNC: 29 MG/DL (ref 40–59)
HGB BLD-MCNC: 13.9 G/DL (ref 14–18)
LDLC SERPL CALC-MCNC: 55 MG/DL (ref 0–129)
MCH RBC QN AUTO: 27.3 PG (ref 27–31.3)
MCHC RBC AUTO-ENTMCNC: 33.8 % (ref 33–37)
MCV RBC AUTO: 80.7 FL (ref 79–92.2)
PLATELET # BLD AUTO: 255 K/UL (ref 130–400)
RBC # BLD AUTO: 5.11 M/UL (ref 4.7–6.1)
TRIGL SERPL-MCNC: 211 MG/DL (ref 0–150)
WBC # BLD AUTO: 9.7 K/UL (ref 4.8–10.8)

## 2023-08-29 PROCEDURE — 3079F DIAST BP 80-89 MM HG: CPT | Performed by: FAMILY MEDICINE

## 2023-08-29 PROCEDURE — G0439 PPPS, SUBSEQ VISIT: HCPCS | Performed by: FAMILY MEDICINE

## 2023-08-29 PROCEDURE — 3075F SYST BP GE 130 - 139MM HG: CPT | Performed by: FAMILY MEDICINE

## 2023-08-29 PROCEDURE — 3051F HG A1C>EQUAL 7.0%<8.0%: CPT | Performed by: FAMILY MEDICINE

## 2023-08-29 PROCEDURE — 1123F ACP DISCUSS/DSCN MKR DOCD: CPT | Performed by: FAMILY MEDICINE

## 2023-08-29 ASSESSMENT — PATIENT HEALTH QUESTIONNAIRE - PHQ9
SUM OF ALL RESPONSES TO PHQ9 QUESTIONS 1 & 2: 0
2. FEELING DOWN, DEPRESSED OR HOPELESS: 0
1. LITTLE INTEREST OR PLEASURE IN DOING THINGS: 0
SUM OF ALL RESPONSES TO PHQ QUESTIONS 1-9: 0

## 2023-08-29 ASSESSMENT — LIFESTYLE VARIABLES
HOW MANY STANDARD DRINKS CONTAINING ALCOHOL DO YOU HAVE ON A TYPICAL DAY: PATIENT DOES NOT DRINK
HOW OFTEN DO YOU HAVE A DRINK CONTAINING ALCOHOL: NEVER

## 2023-09-01 RX ORDER — METFORMIN HYDROCHLORIDE 500 MG/1
1000 TABLET, EXTENDED RELEASE ORAL
Qty: 180 TABLET | Refills: 1 | Status: SHIPPED | OUTPATIENT
Start: 2023-09-01

## 2023-09-02 PROBLEM — K25.9 GASTRIC ULCER: Status: ACTIVE | Noted: 2023-09-02

## 2023-09-02 PROBLEM — R07.9 CHEST PAIN: Status: ACTIVE | Noted: 2023-09-02

## 2023-09-02 PROBLEM — R97.20 ELEVATED PSA: Status: ACTIVE | Noted: 2023-09-02

## 2023-09-02 PROBLEM — I48.0 PAROXYSMAL ATRIAL FIBRILLATION (MULTI): Status: ACTIVE | Noted: 2023-09-02

## 2023-09-02 PROBLEM — R31.9 HEMATURIA: Status: ACTIVE | Noted: 2023-09-02

## 2023-09-02 PROBLEM — R21 RASH: Status: ACTIVE | Noted: 2023-09-02

## 2023-09-02 PROBLEM — Z98.61 CAD S/P PERCUTANEOUS CORONARY ANGIOPLASTY: Status: ACTIVE | Noted: 2023-09-02

## 2023-09-02 PROBLEM — I45.10 RIGHT BUNDLE BRANCH BLOCK (RBBB): Status: ACTIVE | Noted: 2023-09-02

## 2023-09-02 PROBLEM — I25.10 CAD S/P PERCUTANEOUS CORONARY ANGIOPLASTY: Status: ACTIVE | Noted: 2023-09-02

## 2023-09-02 PROBLEM — N48.9 PENILE LESION: Status: ACTIVE | Noted: 2023-09-02

## 2023-09-02 PROBLEM — E66.3 OVERWEIGHT WITH BODY MASS INDEX (BMI) OF 28 TO 28.9 IN ADULT: Status: ACTIVE | Noted: 2023-09-02

## 2023-09-02 PROBLEM — K22.6 MALLORY-WEISS TEAR: Status: ACTIVE | Noted: 2023-09-02

## 2023-09-02 PROBLEM — I44.2 COMPLETE HEART BLOCK (MULTI): Status: ACTIVE | Noted: 2023-09-02

## 2023-09-02 PROBLEM — N40.0 ENLARGED PROSTATE WITHOUT LOWER URINARY TRACT SYMPTOMS (LUTS): Status: ACTIVE | Noted: 2023-09-02

## 2023-09-02 PROBLEM — Z95.0 PACEMAKER: Status: ACTIVE | Noted: 2023-09-02

## 2023-09-02 PROBLEM — R00.2 PALPITATIONS: Status: ACTIVE | Noted: 2023-09-02

## 2023-09-02 PROBLEM — I10 ESSENTIAL HYPERTENSION: Status: ACTIVE | Noted: 2023-09-02

## 2023-09-02 PROBLEM — E78.2 HYPERLIPIDEMIA, MIXED: Status: ACTIVE | Noted: 2023-09-02

## 2023-09-02 PROBLEM — I25.2 PAST MYOCARDIAL INFARCTION: Status: ACTIVE | Noted: 2023-09-02

## 2023-09-02 PROBLEM — N18.30 CHRONIC KIDNEY DISEASE, STAGE 3 (MULTI): Status: ACTIVE | Noted: 2023-09-02

## 2023-09-02 RX ORDER — PANTOPRAZOLE SODIUM 40 MG/1
40 TABLET, DELAYED RELEASE ORAL DAILY
COMMUNITY
Start: 2023-05-30

## 2023-09-02 RX ORDER — SITAGLIPTIN 50 MG/1
1 TABLET, FILM COATED ORAL DAILY
COMMUNITY

## 2023-09-02 RX ORDER — BLOOD SUGAR DIAGNOSTIC
STRIP MISCELLANEOUS DAILY
COMMUNITY
Start: 2023-04-24

## 2023-09-02 RX ORDER — METOPROLOL TARTRATE 50 MG/1
50 TABLET ORAL DAILY
COMMUNITY

## 2023-09-02 RX ORDER — ISOSORBIDE MONONITRATE 30 MG/1
1 TABLET, EXTENDED RELEASE ORAL DAILY
COMMUNITY
End: 2024-04-11 | Stop reason: WASHOUT

## 2023-09-02 RX ORDER — NITROGLYCERIN 0.4 MG/1
0.4 TABLET SUBLINGUAL EVERY 5 MIN PRN
COMMUNITY

## 2023-09-02 RX ORDER — ATORVASTATIN CALCIUM 40 MG/1
1 TABLET, FILM COATED ORAL DAILY
COMMUNITY
Start: 2022-04-07

## 2023-09-02 RX ORDER — APIXABAN 5 MG/1
5 TABLET, FILM COATED ORAL 2 TIMES DAILY
COMMUNITY
Start: 2023-06-12 | End: 2023-10-10 | Stop reason: ALTCHOICE

## 2023-09-02 RX ORDER — EMPAGLIFLOZIN 10 MG/1
TABLET, FILM COATED ORAL
COMMUNITY
Start: 2023-04-06 | End: 2024-04-11 | Stop reason: WASHOUT

## 2023-09-02 RX ORDER — NAPROXEN SODIUM 220 MG/1
1 TABLET, FILM COATED ORAL DAILY
COMMUNITY
Start: 2022-08-12

## 2023-09-02 RX ORDER — CLOPIDOGREL BISULFATE 75 MG/1
1 TABLET ORAL DAILY
COMMUNITY
End: 2024-04-11 | Stop reason: WASHOUT

## 2023-09-02 RX ORDER — VALSARTAN 160 MG/1
1 TABLET ORAL DAILY
COMMUNITY

## 2023-09-02 RX ORDER — ALBUTEROL SULFATE 90 UG/1
2 AEROSOL, METERED RESPIRATORY (INHALATION) EVERY 6 HOURS PRN
COMMUNITY
Start: 2022-12-27 | End: 2023-10-10 | Stop reason: ALTCHOICE

## 2023-09-02 RX ORDER — WARFARIN 2 MG/1
2 TABLET ORAL
COMMUNITY
Start: 2022-11-14 | End: 2024-04-11 | Stop reason: WASHOUT

## 2023-09-05 DIAGNOSIS — I25.10 CORONARY ARTERY DISEASE INVOLVING NATIVE HEART WITHOUT ANGINA PECTORIS, UNSPECIFIED VESSEL OR LESION TYPE: ICD-10-CM

## 2023-09-05 DIAGNOSIS — I44.2 COMPLETE HEART BLOCK (HCC): ICD-10-CM

## 2023-09-05 RX ORDER — WARFARIN SODIUM 2 MG/1
TABLET ORAL
Qty: 90 TABLET | Refills: 3 | Status: SHIPPED | OUTPATIENT
Start: 2023-09-05

## 2023-09-05 RX ORDER — ISOSORBIDE MONONITRATE 30 MG/1
30 TABLET, EXTENDED RELEASE ORAL DAILY
Qty: 90 TABLET | Refills: 3 | Status: SHIPPED | OUTPATIENT
Start: 2023-09-05

## 2023-09-05 RX ORDER — ATORVASTATIN CALCIUM 40 MG/1
40 TABLET, FILM COATED ORAL DAILY
Qty: 90 TABLET | Refills: 3 | Status: SHIPPED | OUTPATIENT
Start: 2023-09-05

## 2023-09-05 NOTE — TELEPHONE ENCOUNTER
Comments:     Last Office Visit (last PCP visit):   8/29/2023    Next Visit Date:  Future Appointments   Date Time Provider [address]   12/11/2023  8:30 AM Olivia Villalpando MD [address]       **If hasn't been seen in over a year OR hasn't followed up according to last diabetes/ADHD visit, make appointment for patient before sending refill to provider.     Rx requested:  Requested Prescriptions     Pending Prescriptions Disp Refills    isosorbide mononitrate (IMDUR) 30 MG extended release tablet [Pharmacy Med Name: Isosorbide Mononitrate ER 30 MG Oral Tablet Extended Release 24 Hour] 90 tablet 3     Sig: TAKE 1 TABLET BY MOUTH DAILY    JANTOVEN 2 MG tablet [Pharmacy Med Name: Axel Kasal  2MG  TAB] 90 tablet 3     Sig: TAKE 1 TABLET BY MOUTH ONCE  DAILY OR AS DIRECTED BY [facility] DEPT    atorvastatin (LIPITOR) 40 MG tablet [Pharmacy Med Name: Atorvastatin Calcium 40 MG Oral Tablet] 90 tablet 3     Sig: TAKE 1 TABLET BY MOUTH DAILY

## 2023-09-07 VITALS
RESPIRATION RATE: 16 BRPM | HEART RATE: 80 BPM | TEMPERATURE: 97 F | DIASTOLIC BLOOD PRESSURE: 93 MMHG | SYSTOLIC BLOOD PRESSURE: 175 MMHG

## 2023-09-12 RX ORDER — LANCETS 33 GAUGE
EACH MISCELLANEOUS
Qty: 300 EACH | Refills: 0 | Status: SHIPPED | OUTPATIENT
Start: 2023-09-12 | End: 2023-09-14 | Stop reason: SDUPTHER

## 2023-09-12 NOTE — TELEPHONE ENCOUNTER
Comments:     Last Office Visit (last PCP visit):   5/11/2022    Next Visit Date:  Future Appointments   Date Time Provider [address]   12/11/2023  8:30 AM Justine Benitez MD [facility] AT [place]       **If hasn't been seen in over a year OR hasn't followed up according to last diabetes/ADHD visit, make appointment for patient before sending refill to provider.     Rx requested:  Requested Prescriptions     Pending Prescriptions Disp Refills    Lancets ([address]) Debra Veloz [Pharmacy Med Name: Mike Fortino Plus [id number] Miscellaneous]  0     Sig: USE TWO TIMES A DAY AND AS NEEDED FOR SYMPTOMS OF IRREGULAT BLOOD GLUCOSE

## 2023-09-13 DIAGNOSIS — E11.9 TYPE 2 DIABETES MELLITUS WITHOUT COMPLICATION, UNSPECIFIED WHETHER LONG TERM INSULIN USE (HCC): Primary | ICD-10-CM

## 2023-09-14 RX ORDER — LANCETS 33 GAUGE
EACH MISCELLANEOUS
Qty: 300 EACH | Refills: 0 | Status: SHIPPED | OUTPATIENT
Start: 2023-09-14

## 2023-09-30 NOTE — H&P
History of Present Illness:   History Present Illness:  Reason for surgery: phimosis, elevated PSA   HPI:    77-year-old male with a history of an elevated PSA, neuro phimosis with bleeding from his penis he presents today for circumcision and prostate biopsy.  He was recently  admitted in late May with a upper GI bleed after accidentally taking too much Coumadin.  He reports that he has been holding his Coumadin x5 days, Plavix x7 days.    Allergies:        Allergies:  ·  No Known Allergies :     Home Medication Review:   Home Medications Reviewed: yes     Impression/Procedure:   ·  Impression and Planned Procedure: Okay to proceed with circumcision and prostate biopsy       ERAS (Enhanced Recovery After Surgery):  ·  ERAS Patient: no       Vital Signs:  Temperature C: 36.1 degrees C   Temperature F: 96.9 degrees F   Heart Rate: 80 beats per minute   Respiratory Rate: 16 breath per minute   Blood Pressure Systolic: 175 mm/Hg   Blood Pressure Diastolic: 93 mm/Hg     Physical Exam by System:    Respiratory/Thorax: Clear to auscultation bilaterally   Cardiovascular: Rate and rhythm, normal S1/2     Consent:   COVID-19 Consent:  ·  COVID-19 Risk Consent Surgeon has reviewed key risks related to the risk of tarik COVID-19 and if they contract COVID-19 what the risks are.       Electronic Signatures:  Crow Owusu)  (Signed 20-Jun-2023 14:03)   Authored: History of Present Illness, Allergies, Home  Medication Review, Impression/Procedure, ERAS, Physical Exam, Consent, Note Completion      Last Updated: 20-Jun-2023 14:03 by Crow Owusu)

## 2023-10-02 NOTE — OP NOTE
PROCEDURE DETAILS    Preoperative Diagnosis:  Elevated PSA, phimosis    Postoperative Diagnosis:  Elevated PSA, phimosis    Surgeon: Crow Owusu  Resident/Fellow/Other Assistant: Trace Heller    Procedure:  1. CIRCUMCISION      2. TRANSPERINEAL PROSTATE BIOPSY    Anesthesia: No anesthesiologist associated with this case  Estimated Blood Loss: 10  Findings: 121 g prostate, low PSA density.  No nodules on exam.  Uncomplicated circumcision.  Specimens(s) Collected: yes,  1.  Foreskin, 2.  Prostate biopsies please see dictation         Operative Report:   Operative Indications: 77-year-old male with a very narrow phimosis leading to bleeding with retraction of his foreskin, poor hygiene, recurrent balanitis.   Additionally, his PSA was greater than 10 and therefore he was consented for prostate biopsy as well as circumcision. The patient was counseled regarding the risks, benefits, alternatives, equipment, and personnel involved and consented to proceed with  surgical intervention.      Operative Procedure:   The patient was correctly identified and the operative plan was confirmed with the patient and the operative team. A weight appropriate dose of prophylactic antibiotics was administered intravenously prior to the procedure and sequential compression devices  were applied to the lower extremities and activated prior to induction of anesthesia. The patient was placed in supine position. Anesthesia was induced. The patient was repositioned in dorsal lithotomy. All pressure points were padded per protocol and  the operative area was prepped and draped in the usual sterile fashion.    A digital rectal exam revealed: Large gland, benign feeling    The operative area was then prepped and draped in the usual sterile fashion.    The transrectal ultrasound probe was inserted into the rectum.  The perineal skin was infiltrated with 5 cc of 0.25% Marcaine plain on both sides of the perineal raphae.  A bilateral pudendal  nerve block was performed using the precision point guide.   Using a 22-gauge, 7 inch spinal needle 10 cc of 0.25% Marcaine were infiltrated on either side of the pelvic musculature towards the lateral aspect of the prostate as well as the needle tracks bilaterally.  In total, 30 cc of Marcaine were used.    The prostate was then measured as noted in the operative findings section above and the volume was used to calculate the PSA density.  There were no visible hypoechoic lesions present.    An 18-gauge core biopsy needle was then used to obtain 2 biopsies from each site: Posterior medial, posterior lateral, prostate base, anterior prostate bilaterally.  In total, 16 biopsy cores were taken.    The patient was then repositioned in supine position and reprepped using Betadine solution.    A penile block was performed by instilling 5 cc of local anesthesia, [], at the penopubic junction at 10 and 2.  Additionally, a ring block was performed by instilling 10 cc around the base of the penis circumferentially.    Glans stitch was placed using a 2-0 silk suture.  The proximal skin collar and distal mucosal collar were marked out circumferentially using a marking pen.  I used care to make sure to include the cicatrix within the excised foreskin.  The mucosal collars  were incised circumferentially using a scalpel.  Dartos fascia was divided down to Pendleton's fascia and the proximal aspect of the mucosal collar.  Distally, and divided subcutaneous tissues until Pendleton's fascia was encountered.  A hemostat was then passed  from the distal mucosal collar to the proximal skin collar and the intervening skin was divided.  Snaps were placed on the 4 corners of the divided skin.  Then  excised foreskin as well as underlying dartos tissue were excised using electrocautery.  Hemostasis  was then obtained using judicious electrocautery.  On the dorsal near the dorsal neurovascular complex he had persistent oozing and in an effort to  avoid electrocautery, I placed a 4-0 Vicryl figure-of-eight suture for hemostasis.  Next, dartos tissue  was reapproximated using interrupted 4-0 Vicryl suture in 4 quadrants.  Skin was then reapproximated using interrupted 4-0 chromic suture.  At the conclusion, there was a good cosmetic result.  Patient was cleaned, surgical glue was applied.  Once the  surgical glue dried, a Ryan wrap was applied followed by a Coban wrap leaving the meatus open for voiding    Counts were correct. Sign-out was performed with the surgical team confirming the specimen listed below. The patient tolerated the procedure well, emerged from anesthesia without incident, and was transferred to PACU in stable condition.     I (Crow Owusu MD) performed the procedure with assistance from [].                        Attestation:   Note Completion:  Attending Attestation I performed the procedure without a resident         Electronic Signatures:  Crow Owusu)  (Signed 20-Jun-2023 16:11)   Authored: Post-Operative Note, Chart Review, Note Completion      Last Updated: 20-Jun-2023 16:11 by Crow Owusu)

## 2023-10-10 ENCOUNTER — HOSPITAL ENCOUNTER (OUTPATIENT)
Dept: CARDIOLOGY | Facility: HOSPITAL | Age: 78
Discharge: HOME | End: 2023-10-10
Payer: MEDICARE

## 2023-10-10 ENCOUNTER — OFFICE VISIT (OUTPATIENT)
Dept: CARDIOLOGY | Facility: CLINIC | Age: 78
End: 2023-10-10
Payer: MEDICARE

## 2023-10-10 VITALS
HEIGHT: 69 IN | BODY MASS INDEX: 26.36 KG/M2 | DIASTOLIC BLOOD PRESSURE: 82 MMHG | WEIGHT: 178 LBS | SYSTOLIC BLOOD PRESSURE: 138 MMHG

## 2023-10-10 DIAGNOSIS — I48.91 ATRIAL FIBRILLATION, UNSPECIFIED TYPE (MULTI): ICD-10-CM

## 2023-10-10 DIAGNOSIS — E66.3 OVERWEIGHT WITH BODY MASS INDEX (BMI) OF 26 TO 26.9 IN ADULT: ICD-10-CM

## 2023-10-10 DIAGNOSIS — Z98.61 CAD S/P PERCUTANEOUS CORONARY ANGIOPLASTY: ICD-10-CM

## 2023-10-10 DIAGNOSIS — I25.10 CAD S/P PERCUTANEOUS CORONARY ANGIOPLASTY: ICD-10-CM

## 2023-10-10 DIAGNOSIS — Z95.0 PACEMAKER: ICD-10-CM

## 2023-10-10 DIAGNOSIS — I48.0 PAROXYSMAL ATRIAL FIBRILLATION (MULTI): Primary | ICD-10-CM

## 2023-10-10 DIAGNOSIS — Z87.891 FORMER SMOKER: ICD-10-CM

## 2023-10-10 DIAGNOSIS — I49.5 SYNCOPE DUE TO SICK SINUS SYNDROME (MULTI): ICD-10-CM

## 2023-10-10 DIAGNOSIS — E78.2 HYPERLIPIDEMIA, MIXED: ICD-10-CM

## 2023-10-10 DIAGNOSIS — I10 ESSENTIAL HYPERTENSION: ICD-10-CM

## 2023-10-10 DIAGNOSIS — I44.2 ATRIOVENTRICULAR BLOCK, COMPLETE (MULTI): ICD-10-CM

## 2023-10-10 DIAGNOSIS — R55 SYNCOPE DUE TO SICK SINUS SYNDROME (MULTI): ICD-10-CM

## 2023-10-10 PROCEDURE — 1159F MED LIST DOCD IN RCRD: CPT | Performed by: INTERNAL MEDICINE

## 2023-10-10 PROCEDURE — 93000 ELECTROCARDIOGRAM COMPLETE: CPT | Performed by: INTERNAL MEDICINE

## 2023-10-10 PROCEDURE — 99215 OFFICE O/P EST HI 40 MIN: CPT | Performed by: INTERNAL MEDICINE

## 2023-10-10 PROCEDURE — 3079F DIAST BP 80-89 MM HG: CPT | Performed by: INTERNAL MEDICINE

## 2023-10-10 PROCEDURE — 93280 PM DEVICE PROGR EVAL DUAL: CPT | Performed by: INTERNAL MEDICINE

## 2023-10-10 PROCEDURE — 93280 PM DEVICE PROGR EVAL DUAL: CPT

## 2023-10-10 PROCEDURE — 93290 INTERROG DEV EVAL ICPMS IP: CPT | Performed by: INTERNAL MEDICINE

## 2023-10-10 PROCEDURE — 1036F TOBACCO NON-USER: CPT | Performed by: INTERNAL MEDICINE

## 2023-10-10 PROCEDURE — 3075F SYST BP GE 130 - 139MM HG: CPT | Performed by: INTERNAL MEDICINE

## 2023-10-10 NOTE — PATIENT INSTRUCTIONS
Remote device checks in 3 and 9 months  Device Clinic at 6 and 12 months  Follow up office visit in 6 months with Rose    Continue same medications/treatment.  Patient educated on proper medication use.  Please bring all medicines, vitamins and herbal supplements with you when you come to the office.    IVida LPN, an scribing for and in the presence of Dr. Olga Rivers MD, FACC, FACP, FHPS

## 2023-10-10 NOTE — PROGRESS NOTES
Chief Complaint:   No chief complaint on file.     History Of Present Illness:    Chino Owusu is a 77 y.o. male presenting with follow-up.  Is very appreciative of having pacemaker.  Since pacemaker implant, he has had no arrhythmia symptoms.  He denies any near-syncope or syncope.  Last Recorded Vitals:  Vitals:    10/10/23 1503   BP: 138/82     See vital sign flowsheet    Past Medical History:  He has a past medical history of Overweight (09/02/2022).    Past Surgical History:  He has a past surgical history that includes Other surgical history (01/17/2022); Other surgical history (01/17/2022); Other surgical history (01/17/2022); Other surgical history (01/17/2022); Other surgical history (01/19/2022); Other surgical history (09/02/2022); and Other surgical history (09/02/2022).      Social History:  He has no history on file for tobacco use, alcohol use, and drug use.    Family History:  Family History   Problem Relation Name Age of Onset    Other (arteriosclerotic cardio disease) Mother      Other (arteriosclerotic cardio disease) Father          Allergies:  Patient has no known allergies.    Outpatient Medications:  Current Outpatient Medications   Medication Instructions    apixaban (Eliquis) 5 mg tablet TAKE 1 TABLET BY MOUTH TWO TIMES A DAY    aspirin 81 mg chewable tablet 1 tablet, oral, Daily    aspirin 81 mg chewable tablet CHEW 1 TABLET BY MOUTH ONCE DAILY    atorvastatin (Lipitor) 40 mg tablet 1 tablet, oral, Daily, As directed    clopidogrel (Plavix) 75 mg tablet 1 tablet, oral, Daily    Eliquis 5 mg, oral, 2 times daily    isosorbide mononitrate ER (Imdur) 30 mg 24 hr tablet 1 tablet, oral, Daily    Januvia 50 mg tablet 1 tablet, oral, Daily    Jardiance 10 mg oral, Jardiance 10 mg    metoprolol tartrate (LOPRESSOR) 75 mg, oral, 2 times daily    nitroglycerin (NITROSTAT) 0.4 mg, sublingual, Every 5 min PRN, Up to 3 doses.    OneTouch Ultra Test strip Daily    pantoprazole (PROTONIX) 40 mg, oral,  Daily    valsartan (Diovan) 160 mg tablet 1 tablet, oral, Daily    Ventolin HFA 90 mcg/actuation inhaler 2 puffs, inhalation, Every 6 hours PRN    warfarin (COUMADIN) 2 mg, oral, Daily or as directed by [facility] protime dept     Review of Systems   All other systems reviewed and are negative.        Physical Exam:  Constitutional:       Appearance: Healthy appearance. Not in distress.   Eyes:      Conjunctiva/sclera: Conjunctivae normal.      Pupils: Pupils are equal, round, and reactive to light.   Neck:      Vascular: No JVR. JVD normal.   Pulmonary:      Effort: Pulmonary effort is normal.      Breath sounds: Normal breath sounds. No wheezing. No rhonchi. No rales.   Chest:      Chest wall: Not tender to palpatation.   Cardiovascular:      PMI at left midclavicular line. Normal rate. Regular rhythm. Normal S1. Normal S2.       Murmurs: There is no murmur.      No gallop.  No click. No rub.   Pulses:     Intact distal pulses.   Edema:     Peripheral edema absent.   Abdominal:      Tenderness: There is no abdominal tenderness.   Musculoskeletal: Normal range of motion.         General: No tenderness.      Cervical back: Normal range of motion. Skin:     General: Skin is warm and dry.   Neurological:      General: No focal deficit present.      Mental Status: Alert and oriented to person, place and time.     Normal device site       Last Labs:  CBC -  Lab Results   Component Value Date    WBC 9.2 05/30/2023    HGB 11.7 (L) 05/30/2023    HCT 35.3 (L) 05/30/2023    MCV 85 05/30/2023     05/30/2023       CMP -  Lab Results   Component Value Date    CALCIUM 8.4 (L) 05/30/2023    PHOS 3.1 05/30/2023    PROT 4.9 (L) 05/28/2023    ALBUMIN 3.1 (L) 05/30/2023    AST 45 (H) 05/28/2023    ALT 35 05/28/2023    ALKPHOS 72 05/28/2023    BILITOT 2.0 (H) 05/28/2023       LIPID PANEL -   Lab Results   Component Value Date    CHOL 128 10/07/2020    TRIG 143 10/07/2020    HDL 31.0 (A) 10/07/2020    CHHDL 4.1 10/07/2020    LDLF 68  "10/07/2020    VLDL 29 10/07/2020       RENAL FUNCTION PANEL -   Lab Results   Component Value Date    GLUCOSE 132 (H) 05/30/2023     05/30/2023    K 3.6 05/30/2023     05/30/2023    CO2 24 05/30/2023    ANIONGAP 15 05/30/2023    BUN 11 05/30/2023    CREATININE 1.14 05/30/2023    GFRMALE 66 05/30/2023    CALCIUM 8.4 (L) 05/30/2023    PHOS 3.1 05/30/2023    ALBUMIN 3.1 (L) 05/30/2023        Lab Results   Component Value Date     (H) 08/08/2022    HGBA1C 9.2 (H) 08/29/2023       Last Cardiology Tests:  ECG:    Today.  Appropriate pacing.  Left axis deviation.  First-degree AV block.  Paced QT interval 450 ms      Device check today.  [employer] pacemaker.  Estimate longevity device over 7 years.  Less than 1% atrial fibrillation    Echo:  No results found for this or any previous visit from the past 1095 days.      Ejection Fractions:  No results found for: \"EF\"    Cath:  No results found for this or any previous visit from the past 1095 days.      Stress Test:  No results found for this or any previous visit from the past 1095 days.      Cardiac Imaging:  No results found for this or any previous visit from the past 1095 days.        Lab review: I have personally reviewed the laboratory result(s) see above    Assessment/Plan   Problem List Items Addressed This Visit             ICD-10-CM       Cardiac and Vasculature    CAD S/P percutaneous coronary angioplasty I25.10, Z98.61    Essential hypertension I10    Hyperlipidemia, mixed E78.2    Pacemaker Z95.0    Relevant Orders    Cardiac device check - Remote    Cardiac device check - In Clinic    Paroxysmal atrial fibrillation (CMS/HCC) - Primary I48.0    Relevant Orders    ECG 12 lead (Ancillary Performed)    ECG 12 Lead (Completed)    Cardiac device check - Remote    Cardiac device check - In Clinic       Endocrine/Metabolic    Overweight with body mass index (BMI) of 26 to 26.9 in adult E66.3, Z68.26       Tobacco    Former smoker Z87.891 "     Other Visit Diagnoses         Codes    Syncope due to sick sinus syndrome (CMS/[facility])     R55, I49.5    Relevant Orders    Cardiac device check - Remote              Coronary artery disease status post remote angioplasty with stent deployment with recent left heart catheterization 2022 revealing less than 10% left main, 10 to 30% proximal LAD, 80% mid LAD, 50% distal LAD, patent LAD stents, 50% ostial circumflex, 75% proximal second OMB, 10 to 30% RCA with patent stents.   Postoperatively underwent PCI drug-eluting stent to the mid LAD 2022.  Chronic.  Stable.  Asymptomatic.  Reviewed meds.  Continue meds.  Refills  Second-degree AV block, Mobitz type II progressing to complete heart block status post dual-chamber pacemaker  [employer] dual-chamber pacemaker.  Reviewed device check.  Normal device function.  Discussed standard of care for follow-up in device.  Reviewed ECG.  Ordered device checks.  Alternate remote checks with device clinic paired with EP office visit.  Paroxysmal atrial fibrillation per device interrogation on beta-blockade and anticoagulated with Eliquis  High risk med Eliquis.  Continue for now.  He has had history of Katiuska-Marquez tear, upper GI bleed, and anemia.  Will refer Dr. Read for evaluation for watchman.  I discussed this briefly with the patient.  I also contacted Dr. Read regarding above.  Left ventricular ejection fraction of 70 to 75% per 2D echocardiogram dated May 26, 2023.  Valvular heart disease consisting of moderate mitral annular calcification, mild MR and trace TR per 2D echocardiogram dated May 26, 2023.  Hyperlipidemia on statin.  Reviewed labs.  Hypertension, controlled with a blood pressure today of 154/84.  Remote tobacco use.  Diabetes mellitus.  Overweight  History of upper GI bleed with upper GI endoscopy dated May 26, 2023 revealing a large Katiuska-Marquez tear at the GE junction with clot status post 3 clips.  See above regarding anemia and Eliquis and  referral to Dr. Read for evaluation for Watchman    Greater than 50% of the visit for discussion of arrhythmia, atrial fibrillation, anticoagulation, possible Watchman device, recent GI bleed, ECG, standard of care for follow-up of pacemaker, model of pacemaker, treatment options, risk, benefits, and imponderables.  American Heart Association recommendations reviewed.  Behavioral medication reviewed.  Refills sent.  All questions answered.  Patient appreciative of care.    Olga Rivers MD

## 2023-10-12 ENCOUNTER — TELEPHONE (OUTPATIENT)
Dept: CARDIOLOGY | Facility: HOSPITAL | Age: 78
End: 2023-10-12
Payer: MEDICARE

## 2023-12-11 ENCOUNTER — OFFICE VISIT (OUTPATIENT)
Dept: FAMILY MEDICINE CLINIC | Age: 78
End: 2023-12-11
Payer: MEDICARE

## 2023-12-11 VITALS
HEART RATE: 80 BPM | DIASTOLIC BLOOD PRESSURE: 88 MMHG | SYSTOLIC BLOOD PRESSURE: 136 MMHG | WEIGHT: 178.4 LBS | HEIGHT: 68 IN | BODY MASS INDEX: 27.04 KG/M2 | OXYGEN SATURATION: 98 % | TEMPERATURE: 96.8 F

## 2023-12-11 DIAGNOSIS — E11.9 TYPE 2 DIABETES MELLITUS WITHOUT COMPLICATION, UNSPECIFIED WHETHER LONG TERM INSULIN USE (HCC): Primary | ICD-10-CM

## 2023-12-11 DIAGNOSIS — I25.10 CORONARY ARTERY DISEASE INVOLVING NATIVE HEART WITHOUT ANGINA PECTORIS, UNSPECIFIED VESSEL OR LESION TYPE: ICD-10-CM

## 2023-12-11 DIAGNOSIS — I10 ESSENTIAL HYPERTENSION: ICD-10-CM

## 2023-12-11 DIAGNOSIS — E11.9 TYPE 2 DIABETES MELLITUS WITHOUT COMPLICATION, UNSPECIFIED WHETHER LONG TERM INSULIN USE (HCC): ICD-10-CM

## 2023-12-11 DIAGNOSIS — E78.2 MIXED HYPERLIPIDEMIA: ICD-10-CM

## 2023-12-11 DIAGNOSIS — N18.30 STAGE 3 CHRONIC KIDNEY DISEASE, UNSPECIFIED WHETHER STAGE 3A OR 3B CKD (HCC): ICD-10-CM

## 2023-12-11 LAB — HBA1C MFR BLD: 7.3 % (ref 4.8–5.9)

## 2023-12-11 PROCEDURE — 1123F ACP DISCUSS/DSCN MKR DOCD: CPT | Performed by: FAMILY MEDICINE

## 2023-12-11 PROCEDURE — 3046F HEMOGLOBIN A1C LEVEL >9.0%: CPT | Performed by: FAMILY MEDICINE

## 2023-12-11 PROCEDURE — 1036F TOBACCO NON-USER: CPT | Performed by: FAMILY MEDICINE

## 2023-12-11 PROCEDURE — G8427 DOCREV CUR MEDS BY ELIG CLIN: HCPCS | Performed by: FAMILY MEDICINE

## 2023-12-11 PROCEDURE — G8484 FLU IMMUNIZE NO ADMIN: HCPCS | Performed by: FAMILY MEDICINE

## 2023-12-11 PROCEDURE — 3075F SYST BP GE 130 - 139MM HG: CPT | Performed by: FAMILY MEDICINE

## 2023-12-11 PROCEDURE — G8417 CALC BMI ABV UP PARAM F/U: HCPCS | Performed by: FAMILY MEDICINE

## 2023-12-11 PROCEDURE — 3079F DIAST BP 80-89 MM HG: CPT | Performed by: FAMILY MEDICINE

## 2023-12-11 PROCEDURE — 99213 OFFICE O/P EST LOW 20 MIN: CPT | Performed by: FAMILY MEDICINE

## 2023-12-11 RX ORDER — AMOXICILLIN 500 MG/1
500 CAPSULE ORAL 2 TIMES DAILY
Qty: 20 CAPSULE | Refills: 2 | Status: SHIPPED | OUTPATIENT
Start: 2023-12-11

## 2023-12-11 NOTE — PROGRESS NOTES
0    NITROSTAT 0.4 MG SL tablet Place 1 tablet under the tongue every 5 minutes as needed      apixaban (ELIQUIS) 5 MG TABS tablet Take by mouth      pantoprazole (PROTONIX) 40 MG tablet Take by mouth       No current facility-administered medications for this visit.          Past Medical History:   Diagnosis Date    BPH (benign prostatic hypertrophy)     Complete heart block (HCC)     Erectile dysfunction     GI bleed     Heart palpitations     History of heart attack     Hx of blood clots     Hyperlipidemia     Hypertension     PSA elevation     TOTAL-6.6, FREE 1.9, PERCENT FREE 29     Past Surgical History:   Procedure Laterality Date    CARDIAC CATHETERIZATION  2013    stents x2    COLONOSCOPY  , RECTAL BLEED    HIATAL HERNIA,ESOPHAGEAL RING,GASTRITIS    COLONOSCOPY      NORMAL    [place] COLON CA SCRN NOT HI [address] IND N/A 2017    COLONOSCOPY performed by Merissa Shell MD at [address] ECHOCARDIOGRAM  2013    UPPER GASTROINTESTINAL ENDOSCOPY      hiatal hernia    UPPER GASTROINTESTINAL ENDOSCOPY      for GI bleed     Family History   Problem Relation Age of Onset    Heart Disease Mother     Heart Disease Sister      Social History     Socioeconomic History    Marital status:      Spouse name: None    Number of children: None    Years of education: None    Highest education level: None   Tobacco Use    Smoking status: Former     Packs/day: 1.50     Years: 11.00     Additional pack years: 0.00     Total pack years: 16.50     Types: Cigarettes     Quit date: 1976     Years since quittin.6    Smokeless tobacco: Never   Vaping Use    Vaping Use: Never used   Substance and Sexual Activity    Alcohol use: No    Drug use: No    Sexual activity: Yes     Partners: Female     Social Determinants of Health     Financial Resource Strain: Low Risk  (2023)    Overall Financial Resource Strain (CARDIA)     Difficulty of Paying

## 2023-12-26 DIAGNOSIS — I25.10 CORONARY ARTERY DISEASE INVOLVING NATIVE HEART WITHOUT ANGINA PECTORIS, UNSPECIFIED VESSEL OR LESION TYPE: ICD-10-CM

## 2023-12-27 ENCOUNTER — APPOINTMENT (OUTPATIENT)
Dept: CARDIOLOGY | Facility: CLINIC | Age: 78
End: 2023-12-27
Payer: MEDICARE

## 2023-12-28 RX ORDER — METOPROLOL TARTRATE 50 MG/1
50 TABLET, FILM COATED ORAL 2 TIMES DAILY
Qty: 180 TABLET | Refills: 3 | Status: SHIPPED | OUTPATIENT
Start: 2023-12-28

## 2024-01-11 ENCOUNTER — HOSPITAL ENCOUNTER (OUTPATIENT)
Dept: CARDIOLOGY | Facility: HOSPITAL | Age: 79
Discharge: HOME | End: 2024-01-11
Payer: MEDICARE

## 2024-01-11 ENCOUNTER — OFFICE VISIT (OUTPATIENT)
Dept: UROLOGY | Facility: CLINIC | Age: 79
End: 2024-01-11
Payer: MEDICARE

## 2024-01-11 VITALS
WEIGHT: 176.59 LBS | BODY MASS INDEX: 26.16 KG/M2 | RESPIRATION RATE: 16 BRPM | HEIGHT: 69 IN | DIASTOLIC BLOOD PRESSURE: 100 MMHG | SYSTOLIC BLOOD PRESSURE: 162 MMHG | HEART RATE: 80 BPM

## 2024-01-11 DIAGNOSIS — I49.5 SYNCOPE DUE TO SICK SINUS SYNDROME (MULTI): ICD-10-CM

## 2024-01-11 DIAGNOSIS — R97.20 ELEVATED PSA: Primary | ICD-10-CM

## 2024-01-11 DIAGNOSIS — N40.0 ENLARGED PROSTATE WITHOUT LOWER URINARY TRACT SYMPTOMS (LUTS): ICD-10-CM

## 2024-01-11 DIAGNOSIS — I48.0 PAROXYSMAL ATRIAL FIBRILLATION (MULTI): ICD-10-CM

## 2024-01-11 DIAGNOSIS — Z95.0 PACEMAKER: ICD-10-CM

## 2024-01-11 DIAGNOSIS — R55 SYNCOPE DUE TO SICK SINUS SYNDROME (MULTI): ICD-10-CM

## 2024-01-11 PROBLEM — N52.01 ERECTILE DYSFUNCTION DUE TO ARTERIAL INSUFFICIENCY: Status: ACTIVE | Noted: 2024-01-11

## 2024-01-11 PROBLEM — N48.9 PENILE LESION: Status: RESOLVED | Noted: 2023-09-02 | Resolved: 2024-01-11

## 2024-01-11 PROBLEM — R31.9 HEMATURIA: Status: RESOLVED | Noted: 2023-09-02 | Resolved: 2024-01-11

## 2024-01-11 PROCEDURE — 3080F DIAST BP >= 90 MM HG: CPT | Performed by: UROLOGY

## 2024-01-11 PROCEDURE — 93296 REM INTERROG EVL PM/IDS: CPT

## 2024-01-11 PROCEDURE — 93294 REM INTERROG EVL PM/LDLS PM: CPT | Performed by: INTERNAL MEDICINE

## 2024-01-11 PROCEDURE — 99214 OFFICE O/P EST MOD 30 MIN: CPT | Performed by: UROLOGY

## 2024-01-11 PROCEDURE — 3077F SYST BP >= 140 MM HG: CPT | Performed by: UROLOGY

## 2024-01-11 PROCEDURE — 1036F TOBACCO NON-USER: CPT | Performed by: UROLOGY

## 2024-01-11 PROCEDURE — 1126F AMNT PAIN NOTED NONE PRSNT: CPT | Performed by: UROLOGY

## 2024-01-11 PROCEDURE — 1159F MED LIST DOCD IN RCRD: CPT | Performed by: UROLOGY

## 2024-01-11 ASSESSMENT — PAIN SCALES - GENERAL: PAINLEVEL: 0-NO PAIN

## 2024-01-11 NOTE — PROGRESS NOTES
PRIOR NOTES  78-year-old male seeing me for penile bleeding, penile mass  PMH: MI w/ stents pacemaker, former smoker, htn, AF on warfarin  On plavix and coumadin  Pt had LHC 08/2022 - 80% stenosis LAD s/p stent  Pt has a bleeding penile lesion identified by Dr. Gutierrez. Also elevated PSA and non-obstructing calculus. Bleeding has been x ~8 mo. Told he needed circumcision, but he put it off until he could get in at .   Reports minimal LUTS. Okay stream.   PSA Hx:  02/9/23 - 14.10  Prior prostate biopsy 10y ago  FHx: No FHx prostate cancer  Exam-narrow phimosis, no penile mass  Digital rectal exam with large prostate, estimated 80 to 100 g     OR 6/20/23 - circumcision + TP remplate biopsy. 121g prostate. No nodules on exam.  Path - HGPIN otherwise benign; Foreskin benign     FUV 7/6/23 -patient has been doing well. No significant penile pain. Edema has improved significantly. He is still applying Vaseline twice daily.  Exam-still some edema in the circumcision line, well-healed    UPDATED SUBJECTIVE HISTORY  01/11/24 - Wants to talk about ED today. No nocturnal erections. Insufficient for penetration. Used viagara years ago with success. Last used 6m ago with good benefit.   No angulation.  Tried ICI but didn't work. Not willing to try again.     Past Medical History  He has a past medical history of Overweight (09/02/2022).    Surgical History  He has a past surgical history that includes Other surgical history (01/17/2022); Other surgical history (01/17/2022); Other surgical history (01/17/2022); Other surgical history (01/17/2022); Other surgical history (01/19/2022); Other surgical history (09/02/2022); and Other surgical history (09/02/2022).     Social History  He reports that he has quit smoking. His smoking use included cigarettes. He has never used smokeless tobacco. He reports that he does not currently use alcohol. He reports that he does not use drugs.    Family History  Family History   Problem  "Relation Name Age of Onset    Other (arteriosclerotic cardio disease) Mother      Other (arteriosclerotic cardio disease) Father          Allergies  Patient has no known allergies.    ROS: 12 system review was completed and is negative with the exception of those signs and symptoms noted in the history of present illness: A 12 system review was completed and is negative with the exception of those signs and symptoms noted in the history of present illness.     Exam:  General: in NAD, appears stated age  Head: normocephalic, atraumatic  Respiratory: normal effort, no use of accessory muscles  Cardiovascular: no edema noted  Skin: normal turgor, no rashes  Neurologic: grossly intact, oriented to person/place/time  Psychiatric: mode and affect appropriate  : Circumcision has healed well, appears normal and healthy, glans with no abnormal rashes or lesions     Last Recorded Vitals  Blood pressure (!) 162/100, pulse 80, resp. rate 16, height 1.753 m (5' 9\"), weight 80.1 kg (176 lb 9.4 oz).    Lab Results   Component Value Date    CREATININE 1.14 05/30/2023    HGB 11.7 (L) 05/30/2023         ASSESSMENT/PLAN:  # BPH without lower urinary tract symptoms  -Continue observation    # Erectile dysfunction  -Due to his cardiac history and need to be on a nitrate daily, I recommended against PDE 5 inhibitors  -We discussed vacuum erectile device, intracavernosal injections, inflatable penile prosthetic  -Ultimately, he opted to forego any of these treatment options  -We also discussed evaluating his testosterone, I briefly went over potential testosterone replacement options and surveillance, mostly due to the need for close surveillance and numerous doctor visits afterwards he became uninterested    Follow-up as needed    Crow Owusu MD    "

## 2024-01-31 ENCOUNTER — APPOINTMENT (OUTPATIENT)
Dept: CARDIOLOGY | Facility: CLINIC | Age: 79
End: 2024-01-31
Payer: MEDICARE

## 2024-02-07 DIAGNOSIS — I48.0 PAROXYSMAL ATRIAL FIBRILLATION (MULTI): ICD-10-CM

## 2024-03-01 RX ORDER — METFORMIN HYDROCHLORIDE 500 MG/1
1000 TABLET, EXTENDED RELEASE ORAL
Qty: 180 TABLET | Refills: 1 | Status: SHIPPED | OUTPATIENT
Start: 2024-03-01

## 2024-04-06 DIAGNOSIS — E11.9 NEW ONSET TYPE 2 DIABETES MELLITUS (HCC): ICD-10-CM

## 2024-04-09 RX ORDER — BLOOD SUGAR DIAGNOSTIC
STRIP MISCELLANEOUS
Qty: 100 STRIP | Refills: 3 | Status: SHIPPED | OUTPATIENT
Start: 2024-04-09

## 2024-04-09 NOTE — TELEPHONE ENCOUNTER
Comments:     Last Office Visit (last PCP visit):   12/11/2023    Next Visit Date:  No future appointments.    **If hasn't been seen in over a year OR hasn't followed up according to last diabetes/ADHD visit, make appointment for patient before sending refill to provider.    Rx requested:  Requested Prescriptions     Pending Prescriptions Disp Refills    ONETOUCH ULTRA strip [Pharmacy Med Name: OneTouch Ultra In Vitro Strip]  0     Sig: USE TO TEST ONCE DAILY

## 2024-04-09 NOTE — PROGRESS NOTES
Cardio: Dr. Rivers    I was asked by Dr. Rivers to evaluate this patient in consultation for evaluation of left atrial appendage closure.    The patient is a 78-year-old male with coronary artery disease including history of non-ST elevation myocardial infarction and PCI with drug-eluting stents placed to the LAD and right coronary artery, hypertension history of high degree AV block status post permanent pacemaker [employer] dual-chamber device, chronic kidney disease. The patient has normal LVEF.  He has mild mitral regurgitation but no history of significant valvular heart disease.    Patient's medical history is notable for history of upper GI bleed in 2023 secondary to large Katiuska Marquez tear at the GE junction status post 3 clips.  Patient required transfusion of 2 units of FFP and 2 units of packed red blood cells.    He has been resumed on Eliquis and also requires concomitant antiplatelet medications given his extensive history of coronary artery disease and PCI.    Fortunately his not had any recurrent GI bleeding.  However, given the patient's significant GI bleeding,  the patient is referred for consideration of left atrial appendage closure for stroke risk reduction.       ROS:  Constitutional: no fatigue  Eyes: no acute eye problems, no blurred vision, no diplopia, no eye pain  ENT:  no acute hearing loss, no earache, no sore throat  Cardiovascular: nodyspnea on exertion, no chest pain  Respiratory: no chronic cough, not coughing up sputum, no wheezing that is consistent with asthma  Gastrointestinal: no acute bowel complaints  Musculoskeletal: no acute arthralgias, no acute myalgias, no acute joint swelling  Skin: no skin rashes, no change in skin color and pigmentation, no skin lesions and no skin lumps.   Neurological: no headaches, no dizziness, no tingling, no fainting and no limb weakness.   Psychiatric:  no suicidal ideation, no confusion, no personality change and no emotional problems.    Hematologic/Lymphatic: no bleeding issues, other then mentioned in HPI  All other systems have been reviewed and are negative for complaint.     Physical Exam:     Visit Vitals  Smoking Status Former        Constitutional: alert and in no acute distress.   Eyes: no erythema, swelling or discharge from the eye .   Ears, Nose, Mouth, and Throat: external inspection of ears and nose is normal , lips, teeth, and gums are normal with good dentition  and oropharynx normal with no erythema, edema, exudate or lesions .   Neck: neck is supple, symmetric, trachea midline, no masses  and no thyromegaly .   Pulmonary: no increased work of breathing or signs of respiratory distress , lungs clear to auscultation. , normal percussion of chest  and chest palpation normal .   Cardiovascular: RRR, no murmur,  no leg edema, non-displaced PMI, no S3 or S4  Abdomen: abdomen non-tender, no masses  and no hepatomegaly .           Skin:  no skin lesions          Neurologic: non-focal neurologic examination.      Psychiatric judgment and insight is normal , oriented to person, place and time , normal mood and affect .       Labs:    Results for orders placed or performed in visit on 03/30/23   Protime-INR   Result Value Ref Range    Protime 19.2 (H) 9.8 - 13.4 sec    INR 1.7 (H) 0.9 - 1.1          Medications:    Current Outpatient Medications   Medication Instructions    apixaban (Eliquis) 5 mg tablet TAKE 1 TABLET BY MOUTH TWO TIMES A DAY    aspirin 81 mg chewable tablet 1 tablet, oral, Daily    atorvastatin (Lipitor) 40 mg tablet 1 tablet, oral, Daily, As directed    clopidogrel (Plavix) 75 mg tablet 1 tablet, oral, Daily    isosorbide mononitrate ER (Imdur) 30 mg 24 hr tablet 1 tablet, oral, Daily    Januvia 50 mg tablet 1 tablet, oral, Daily    Jardiance 10 mg oral, Jardiance 10 mg    metoprolol tartrate (LOPRESSOR) 75 mg, oral, 2 times daily    nitroglycerin (NITROSTAT) 0.4 mg, sublingual, Every 5 min PRN, Up to 3 doses.    OneTouch  Ultra Test strip Daily    pantoprazole (PROTONIX) 40 mg, oral, Daily    valsartan (Diovan) 160 mg tablet 1 tablet, oral, Daily    warfarin (COUMADIN) 2 mg, oral, Daily or as directed by [facility] protime dept          Assessment:      This is a 77-year-old male with multiple medical issues including extensive history of coronary artery disease and PCI, paroxysmal A-fib, and history of significant upper GI bleed secondary to Katiuska-Marquez tear.    The CHADS-VASC score is 3 and HAS-BLED score is 3. The patient is at increased risk of both bleeding and stroke.  As such the patient is a reasonable candidate for consideration of left atrial appendage occluder placement.    Today we discussed the left atrial appendage closure procedure. The patient was given written educational handout materials and watched an educational video. All risks, benefits and alternative were discussed.     The risks discussed included but were not limited to vascular complications, sedation related complications, risk of MI, CVA, device embolization, pericardial tamponade and death. The patient verbalized understanding and I believe he is inclined to proceed.  The patient would like further discussion with his family.  He has a grandson who is a nurse practitioner and he would like to get his input on the procedure as well.         Should the patient decide to proceed, our plan would be for preprocedural planning with cardiac CTA for device sizing and to rule out left atrial appendage thrombus.  In addition the patient will need a CT scan 4 months after the procedure, to evaluate the device for position, thrombus and zoya-device leak.. Following device implant, strategy will be for dual antiplatelet therapy with aspirin and clopidogrel for 6 months then aspirin for life.     Thank you, Dr. Rivers, for this consultation and for allowing me to participate in the care of this patient.

## 2024-04-10 ENCOUNTER — OFFICE VISIT (OUTPATIENT)
Dept: CARDIOLOGY | Facility: CLINIC | Age: 79
End: 2024-04-10
Payer: MEDICARE

## 2024-04-10 ENCOUNTER — HOSPITAL ENCOUNTER (OUTPATIENT)
Dept: CARDIOLOGY | Facility: HOSPITAL | Age: 79
Discharge: HOME | End: 2024-04-10
Payer: MEDICARE

## 2024-04-10 ENCOUNTER — APPOINTMENT (OUTPATIENT)
Dept: CARDIOLOGY | Facility: CLINIC | Age: 79
End: 2024-04-10
Payer: MEDICARE

## 2024-04-10 VITALS
TEMPERATURE: 96.8 F | SYSTOLIC BLOOD PRESSURE: 146 MMHG | OXYGEN SATURATION: 97 % | RESPIRATION RATE: 16 BRPM | WEIGHT: 181 LBS | HEART RATE: 80 BPM | HEIGHT: 69 IN | DIASTOLIC BLOOD PRESSURE: 80 MMHG | BODY MASS INDEX: 26.81 KG/M2

## 2024-04-10 VITALS
HEIGHT: 69 IN | WEIGHT: 180 LBS | HEART RATE: 80 BPM | DIASTOLIC BLOOD PRESSURE: 62 MMHG | SYSTOLIC BLOOD PRESSURE: 124 MMHG | BODY MASS INDEX: 26.66 KG/M2

## 2024-04-10 DIAGNOSIS — I48.0 PAROXYSMAL ATRIAL FIBRILLATION (MULTI): Primary | ICD-10-CM

## 2024-04-10 DIAGNOSIS — K22.6 MALLORY-WEISS TEAR: ICD-10-CM

## 2024-04-10 DIAGNOSIS — R07.9 CHEST PAIN, UNSPECIFIED TYPE: ICD-10-CM

## 2024-04-10 DIAGNOSIS — I44.2 COMPLETE HEART BLOCK (MULTI): ICD-10-CM

## 2024-04-10 DIAGNOSIS — Z98.61 CAD S/P PERCUTANEOUS CORONARY ANGIOPLASTY: ICD-10-CM

## 2024-04-10 DIAGNOSIS — I10 ESSENTIAL HYPERTENSION: ICD-10-CM

## 2024-04-10 DIAGNOSIS — E78.2 HYPERLIPIDEMIA, MIXED: ICD-10-CM

## 2024-04-10 DIAGNOSIS — I25.10 CAD S/P PERCUTANEOUS CORONARY ANGIOPLASTY: ICD-10-CM

## 2024-04-10 DIAGNOSIS — I45.10 RIGHT BUNDLE BRANCH BLOCK (RBBB): ICD-10-CM

## 2024-04-10 DIAGNOSIS — R00.2 PALPITATIONS: ICD-10-CM

## 2024-04-10 DIAGNOSIS — Z95.0 CARDIAC PACEMAKER IN SITU: ICD-10-CM

## 2024-04-10 DIAGNOSIS — I44.2 ATRIOVENTRICULAR BLOCK, COMPLETE (MULTI): ICD-10-CM

## 2024-04-10 DIAGNOSIS — I48.0 PAROXYSMAL ATRIAL FIBRILLATION (MULTI): ICD-10-CM

## 2024-04-10 DIAGNOSIS — Z95.0 PACEMAKER: Primary | ICD-10-CM

## 2024-04-10 PROCEDURE — 99214 OFFICE O/P EST MOD 30 MIN: CPT | Performed by: NURSE PRACTITIONER

## 2024-04-10 PROCEDURE — 99204 OFFICE O/P NEW MOD 45 MIN: CPT | Performed by: INTERNAL MEDICINE

## 2024-04-10 PROCEDURE — 93280 PM DEVICE PROGR EVAL DUAL: CPT | Performed by: INTERNAL MEDICINE

## 2024-04-10 PROCEDURE — 3078F DIAST BP <80 MM HG: CPT | Performed by: NURSE PRACTITIONER

## 2024-04-10 PROCEDURE — 1159F MED LIST DOCD IN RCRD: CPT | Performed by: NURSE PRACTITIONER

## 2024-04-10 PROCEDURE — 99214 OFFICE O/P EST MOD 30 MIN: CPT | Performed by: INTERNAL MEDICINE

## 2024-04-10 PROCEDURE — 1036F TOBACCO NON-USER: CPT | Performed by: NURSE PRACTITIONER

## 2024-04-10 PROCEDURE — 3074F SYST BP LT 130 MM HG: CPT | Performed by: NURSE PRACTITIONER

## 2024-04-10 PROCEDURE — 3079F DIAST BP 80-89 MM HG: CPT | Performed by: INTERNAL MEDICINE

## 2024-04-10 PROCEDURE — 1160F RVW MEDS BY RX/DR IN RCRD: CPT | Performed by: INTERNAL MEDICINE

## 2024-04-10 PROCEDURE — 93280 PM DEVICE PROGR EVAL DUAL: CPT

## 2024-04-10 PROCEDURE — 1159F MED LIST DOCD IN RCRD: CPT | Performed by: INTERNAL MEDICINE

## 2024-04-10 PROCEDURE — 1160F RVW MEDS BY RX/DR IN RCRD: CPT | Performed by: NURSE PRACTITIONER

## 2024-04-10 PROCEDURE — 1036F TOBACCO NON-USER: CPT | Performed by: INTERNAL MEDICINE

## 2024-04-10 PROCEDURE — 3077F SYST BP >= 140 MM HG: CPT | Performed by: INTERNAL MEDICINE

## 2024-04-10 NOTE — PROGRESS NOTES
"CARDIOLOGY OFFICE VISIT      CHIEF COMPLAINT  Chief Complaint   Patient presents with    Atrial Fibrillation     Chief complaint: \"I am doing just fine.\"  HISTORY OF PRESENT ILLNESS  HPI  History: The patient is a 78-year-old  male who is followed for coronary artery disease status post multivessel angioplasty with stent deployment, remote non-ST elevation MI, hypertension, dyslipidemia, diabetes mellitus, second-degree AV block status post dual-chamber pacemaker implant on August 10, 2022 and paroxysmal atrial fibrillation controlled with beta-blockade.  In review of the medication list today, the patient states that he is not certain which medication he is taking, Eliquis or warfarin.  The patient presented to the emergency room at [facility] May 25, 2023 and was transferred to [facility] after vomiting bright red blood.  The patient had been on warfarin at that time.  He had reported that he had accidentally taken 4 tablets of warfarin instead of 4 mg.  His INR was 5.3.  He underwent upper GI endoscopy on May 26, 2023 which revealed a large Katiuska-Marquez tear at the GE junction with clot and 3 endoclips were placed.  Patient received 2 units of packed red blood cells and 3 units of platelets while hospitalized.  At the time of discharge the patient's warfarin was discontinued and he was placed on Eliquis 5 mg twice a day.  Patient states that he has not experienced any additional episodes of gastrointestinal bleeding and has done well.  He denies chest pain, palpitations, dizziness, lightheadedness, shortness of breath, abdominal distention, or lower extremity edema.  Past Medical History  Past Medical History:   Diagnosis Date    Overweight 09/02/2022    Overweight with body mass index (BMI) of 29 to 29.9 in adult       Social History  Social History     Tobacco Use    Smoking status: Former     Types: Cigarettes    Smokeless tobacco: Never   Substance Use Topics "    Alcohol use: Not Currently    Drug use: Never       Family History     Family History   Problem Relation Name Age of Onset    Other (arteriosclerotic cardio disease) Mother      Other (arteriosclerotic cardio disease) Father          Allergies:  No Known Allergies     Outpatient Medications:  Current Outpatient Medications   Medication Instructions    apixaban (Eliquis) 5 mg tablet TAKE 1 TABLET BY MOUTH TWO TIMES A DAY    aspirin 81 mg chewable tablet 1 tablet, oral, Daily    atorvastatin (Lipitor) 40 mg tablet 1 tablet, oral, Daily, As directed    clopidogrel (Plavix) 75 mg tablet 1 tablet, oral, Daily    isosorbide mononitrate ER (Imdur) 30 mg 24 hr tablet 1 tablet, oral, Daily    Januvia 50 mg tablet 1 tablet, oral, Daily    Jardiance 10 mg oral, Jardiance 10 mg    metoprolol tartrate (LOPRESSOR) 50 mg, oral, 2 times daily    nitroglycerin (NITROSTAT) 0.4 mg, sublingual, Every 5 min PRN, Up to 3 doses.    OneTouch Ultra Test strip Daily    pantoprazole (PROTONIX) 40 mg, oral, Daily    valsartan (Diovan) 160 mg tablet 1 tablet, oral, Daily    warfarin (COUMADIN) 2 mg, oral, Daily or as directed by [facility] protime dept          REVIEW OF SYSTEMS  Review of Systems   All other systems reviewed and are negative.        VITALS  Vitals:    04/10/24 1046   BP: 124/62   Pulse: 80       PHYSICAL EXAM  Vitals and nursing note reviewed.   Constitutional:       Appearance: Normal appearance.   HENT:      Head: Normocephalic.   Neck:      Vascular: No JVD.   Cardiovascular:      Rate and Rhythm: Normal rate and regular rhythm.      Pulses: Normal pulses.      Heart sounds: Normal heart sounds.   Pulmonary:      Effort: Pulmonary effort is normal.      Breath sounds: Normal breath sounds.   Abdominal:      General: Bowel sounds are normal.      Palpations: Abdomen is soft.   Musculoskeletal:         General: Normal range of motion.      Cervical back: Normal range of motion.   Skin:     General: Skin is warm and dry.  Left  subclavian pacemaker pocket is well-healed without redness swelling or drainage.  Neurological:      General: No focal deficit present.      Mental Status: She is alert and oriented to person, place, and time.      Motor: Motor function is intact.   Psychiatric:         Attention and Perception: Attention and perception normal.         Mood and Affect: Mood and affect normal.         Speech: Speech normal.         Behavior: Behavior normal. Behavior is cooperative.         Thought Content: Thought content normal.         Cognition and Memory: Cognition and memory normal.     Labs and testing: Twelve-lead EKG reveals atrial and ventricular pacing at 80 bpm.  QRS duration 180 ms,  ms, QTc 512 ms.  1 PVC is noted in the recording.  Pacemaker interrogation dated April 10, 2024 reveals atrial pacing 82% and ventricular pacing 99.53%.  42 AMS episodes were noted with the longest being 4.54 minutes.  The AT/AF burden is less than 1%.  No ventricular arrhythmic events were noted.  Good sensing and capture thresholds.  Estimated battery longevity is 6 years and 8 months.      ASSESSMENT AND PLAN       Clinical impressions:  1. Coronary artery disease status post remote angioplasty with stent deployment with recent left heart catheterization dated August 8, 2022 revealing less than 10% left main, 10 to 30% proximal LAD, 80% mid LAD, 50% distal LAD, patent LAD stents, 50% ostial circumflex, 75% proximal second OMB, 10 to 30% RCA with patent stents. Status post PTCA with drug-eluting stent to the mid LAD on August 18, 2022.  2. Second-degree AV block, Mobitz type II progressing to complete heart block status post dual-chamber pacemaker implant ([employer] [facility]) on August 10, 2022.  3. Paroxysmal atrial fibrillation per device interrogation on beta-blockade and anticoagulated with Eliquis.  4. Dyslipidemia on statin.  5. Hypertension, controlled with a blood pressure today of 124/62.  6. Remote tobacco use.  7.  Diabetes mellitus.  8. Overweight with a BMI of 26.58.  9. Upper GI bleed with upper GI endoscopy dated May 26, 2023 revealing a large Katiuska-Marquez tear at the GE junction with clot status post 3 clips.  10. Left ventricular ejection fraction of 70 to 75% per 2D echocardiogram dated May 26, 2023.  11. Valvular heart disease consisting of moderate mitral annular calcification, mild MR and trace TR per 2D echocardiogram dated May 26, 2023.       Recommendations:  1.  Continue current medications as prescribed.  The patient was provided with a current list of his medications from the [facility] system.  I requested the patient contact me with an updated list of his medications so that his medication list may be corrected within the [facility] system.  Patient states he will call as soon as he gets home.  2.  Obtain a remote pacemaker interrogation on July 16, 2024 and in clinic check in 6 months prior to the office visit with Dr. Rivers.  3.  Follow-up in office with Dr. Rivers in 6 months or sooner if needed.  4.  Follow-up in office with Dr. Read today at 3:30 PM as scheduled for discussion regarding Watchman filter placement.  5.  Follow-up in office with Dr. Galvez on September 25, 2024 at 7:30 AM as scheduled or sooner if needed.  6.  Continue lifestyle modifications as discussed.    Evaluation and note by Rose Esposito CNP  **Please excuse any errors in grammar or translation related to this dictation.  Voice recognition software was utilized to prepare this document.**

## 2024-04-11 DIAGNOSIS — I48.91 ATRIAL FIBRILLATION, UNSPECIFIED TYPE (MULTI): ICD-10-CM

## 2024-04-11 RX ORDER — METFORMIN HYDROCHLORIDE 500 MG/1
1000 TABLET, EXTENDED RELEASE ORAL EVERY MORNING
COMMUNITY

## 2024-04-26 ENCOUNTER — TELEPHONE (OUTPATIENT)
Dept: CARDIOLOGY | Facility: HOSPITAL | Age: 79
End: 2024-04-26
Payer: MEDICARE

## 2024-04-26 NOTE — TELEPHONE ENCOUNTER
RN called pt as a reminder to obtain lab work prior to procedure on 5/3. Labs faxed to [facility] at [phone]. Pt states he will get labs completed on Monday.

## 2024-04-30 ENCOUNTER — TELEPHONE (OUTPATIENT)
Dept: CARDIOLOGY | Facility: HOSPITAL | Age: 79
End: 2024-04-30
Payer: MEDICARE

## 2024-04-30 NOTE — TELEPHONE ENCOUNTER
Following up on labs, pt states he forgot to go yesterday and will go today. Pt states he will go to [facility] on [address] today to have them completed. RN will follow up. [phone]-lab number

## 2024-05-02 PROBLEM — I48.91 ATRIAL FIBRILLATION, UNSPECIFIED TYPE (MULTI): Status: ACTIVE | Noted: 2024-05-02

## 2024-05-02 PROBLEM — Z95.818 PRESENCE OF WATCHMAN LEFT ATRIAL APPENDAGE CLOSURE DEVICE: Status: ACTIVE | Noted: 2024-05-02

## 2024-05-03 ENCOUNTER — HOSPITAL ENCOUNTER (OUTPATIENT)
Dept: CARDIOLOGY | Facility: HOSPITAL | Age: 79
Discharge: HOME | DRG: 274 | End: 2024-05-03
Payer: MEDICARE

## 2024-05-03 ENCOUNTER — APPOINTMENT (OUTPATIENT)
Dept: CARDIOLOGY | Facility: HOSPITAL | Age: 79
DRG: 274 | End: 2024-05-03
Payer: MEDICARE

## 2024-05-03 ENCOUNTER — HOSPITAL ENCOUNTER (OUTPATIENT)
Dept: RADIOLOGY | Facility: HOSPITAL | Age: 79
Discharge: HOME | DRG: 274 | End: 2024-05-03
Payer: MEDICARE

## 2024-05-03 ENCOUNTER — HOSPITAL ENCOUNTER (INPATIENT)
Facility: HOSPITAL | Age: 79
LOS: 1 days | Discharge: HOME | DRG: 274 | End: 2024-05-03
Attending: INTERNAL MEDICINE | Admitting: INTERNAL MEDICINE
Payer: MEDICARE

## 2024-05-03 VITALS
RESPIRATION RATE: 14 BRPM | SYSTOLIC BLOOD PRESSURE: 182 MMHG | DIASTOLIC BLOOD PRESSURE: 115 MMHG | HEART RATE: 80 BPM | OXYGEN SATURATION: 100 %

## 2024-05-03 DIAGNOSIS — I48.0 PAROXYSMAL ATRIAL FIBRILLATION (MULTI): ICD-10-CM

## 2024-05-03 DIAGNOSIS — I48.91 ATRIAL FIBRILLATION, UNSPECIFIED TYPE (MULTI): ICD-10-CM

## 2024-05-03 DIAGNOSIS — Z01.818 PREOPERATIVE EXAMINATION: ICD-10-CM

## 2024-05-03 DIAGNOSIS — I48.91 ATRIAL FIBRILLATION, UNSPECIFIED TYPE (MULTI): Primary | ICD-10-CM

## 2024-05-03 DIAGNOSIS — Z09 POSTOPERATIVE EXAMINATION: ICD-10-CM

## 2024-05-03 DIAGNOSIS — Z95.818 PRESENCE OF WATCHMAN LEFT ATRIAL APPENDAGE CLOSURE DEVICE: ICD-10-CM

## 2024-05-03 LAB — ACT BLD: 360 SEC (ref 83–199)

## 2024-05-03 PROCEDURE — 93325 DOPPLER ECHO COLOR FLOW MAPG: CPT | Performed by: INTERNAL MEDICINE

## 2024-05-03 PROCEDURE — 2720000007 HC OR 272 NO HCPCS: Performed by: INTERNAL MEDICINE

## 2024-05-03 PROCEDURE — G0269 OCCLUSIVE DEVICE IN VEIN ART: HCPCS | Mod: TC,59 | Performed by: INTERNAL MEDICINE

## 2024-05-03 PROCEDURE — 93325 DOPPLER ECHO COLOR FLOW MAPG: CPT

## 2024-05-03 PROCEDURE — 93308 TTE F-UP OR LMTD: CPT | Performed by: INTERNAL MEDICINE

## 2024-05-03 PROCEDURE — 7100000009 HC PHASE TWO TIME - INITIAL BASE CHARGE: Performed by: INTERNAL MEDICINE

## 2024-05-03 PROCEDURE — C1760 CLOSURE DEV, VASC: HCPCS | Performed by: INTERNAL MEDICINE

## 2024-05-03 PROCEDURE — 93308 TTE F-UP OR LMTD: CPT

## 2024-05-03 PROCEDURE — 2780000003 HC OR 278 NO HCPCS: Performed by: INTERNAL MEDICINE

## 2024-05-03 PROCEDURE — C1889 IMPLANT/INSERT DEVICE, NOC: HCPCS | Performed by: INTERNAL MEDICINE

## 2024-05-03 PROCEDURE — 93005 ELECTROCARDIOGRAM TRACING: CPT

## 2024-05-03 PROCEDURE — 2500000001 HC RX 250 WO HCPCS SELF ADMINISTERED DRUGS (ALT 637 FOR MEDICARE OP): Performed by: INTERNAL MEDICINE

## 2024-05-03 PROCEDURE — 33340 PERQ CLSR TCAT L ATR APNDGE: CPT | Performed by: INTERNAL MEDICINE

## 2024-05-03 PROCEDURE — 2500000004 HC RX 250 GENERAL PHARMACY W/ HCPCS (ALT 636 FOR OP/ED): Performed by: NURSE PRACTITIONER

## 2024-05-03 PROCEDURE — C1894 INTRO/SHEATH, NON-LASER: HCPCS | Performed by: INTERNAL MEDICINE

## 2024-05-03 PROCEDURE — 85347 COAGULATION TIME ACTIVATED: CPT

## 2024-05-03 PROCEDURE — 99152 MOD SED SAME PHYS/QHP 5/>YRS: CPT | Performed by: INTERNAL MEDICINE

## 2024-05-03 PROCEDURE — 75572 CT HRT W/3D IMAGE: CPT

## 2024-05-03 PROCEDURE — 02L73DK OCCLUSION OF LEFT ATRIAL APPENDAGE WITH INTRALUMINAL DEVICE, PERCUTANEOUS APPROACH: ICD-10-PCS | Performed by: INTERNAL MEDICINE

## 2024-05-03 PROCEDURE — 2550000001 HC RX 255 CONTRASTS: Performed by: INTERNAL MEDICINE

## 2024-05-03 PROCEDURE — 93662 INTRACARDIAC ECG (ICE): CPT | Performed by: INTERNAL MEDICINE

## 2024-05-03 PROCEDURE — 85347 COAGULATION TIME ACTIVATED: CPT | Performed by: INTERNAL MEDICINE

## 2024-05-03 PROCEDURE — 2500000004 HC RX 250 GENERAL PHARMACY W/ HCPCS (ALT 636 FOR OP/ED): Performed by: INTERNAL MEDICINE

## 2024-05-03 PROCEDURE — 2500000005 HC RX 250 GENERAL PHARMACY W/O HCPCS: Performed by: INTERNAL MEDICINE

## 2024-05-03 PROCEDURE — 99153 MOD SED SAME PHYS/QHP EA: CPT | Performed by: INTERNAL MEDICINE

## 2024-05-03 PROCEDURE — 1200000002 HC GENERAL ROOM WITH TELEMETRY DAILY

## 2024-05-03 PROCEDURE — 2500000001 HC RX 250 WO HCPCS SELF ADMINISTERED DRUGS (ALT 637 FOR MEDICARE OP): Performed by: NURSE PRACTITIONER

## 2024-05-03 PROCEDURE — 7100000010 HC PHASE TWO TIME - EACH INCREMENTAL 1 MINUTE: Performed by: INTERNAL MEDICINE

## 2024-05-03 PROCEDURE — 93321 DOPPLER ECHO F-UP/LMTD STD: CPT | Performed by: INTERNAL MEDICINE

## 2024-05-03 PROCEDURE — C1893 INTRO/SHEATH, FIXED,NON-PEEL: HCPCS | Performed by: INTERNAL MEDICINE

## 2024-05-03 PROCEDURE — C1759 CATH, INTRA ECHOCARDIOGRAPHY: HCPCS | Performed by: INTERNAL MEDICINE

## 2024-05-03 DEVICE — LEFT ATRIAL APPENDAGE CLOSURE DEVICE WITH DELIVERY SYSTEM
Type: IMPLANTABLE DEVICE | Site: HEART | Status: FUNCTIONAL
Brand: WATCHMAN FLX™

## 2024-05-03 RX ORDER — ONDANSETRON HYDROCHLORIDE 2 MG/ML
4 INJECTION, SOLUTION INTRAVENOUS EVERY 8 HOURS PRN
Status: DISCONTINUED | OUTPATIENT
Start: 2024-05-03 | End: 2024-05-03 | Stop reason: HOSPADM

## 2024-05-03 RX ORDER — PROTAMINE SULFATE 10 MG/ML
INJECTION, SOLUTION INTRAVENOUS CONTINUOUS PRN
Status: COMPLETED | OUTPATIENT
Start: 2024-05-03 | End: 2024-05-03

## 2024-05-03 RX ORDER — CLOPIDOGREL BISULFATE 75 MG/1
75 TABLET ORAL DAILY
Qty: 90 TABLET | Refills: 1 | Status: SHIPPED | OUTPATIENT
Start: 2024-05-03

## 2024-05-03 RX ORDER — ACETAMINOPHEN 160 MG/5ML
650 SOLUTION ORAL EVERY 6 HOURS PRN
Status: DISCONTINUED | OUTPATIENT
Start: 2024-05-03 | End: 2024-05-03 | Stop reason: HOSPADM

## 2024-05-03 RX ORDER — ACETAMINOPHEN 650 MG/1
650 SUPPOSITORY RECTAL EVERY 6 HOURS PRN
Status: DISCONTINUED | OUTPATIENT
Start: 2024-05-03 | End: 2024-05-03 | Stop reason: HOSPADM

## 2024-05-03 RX ORDER — PANTOPRAZOLE SODIUM 40 MG/1
40 TABLET, DELAYED RELEASE ORAL
Status: DISCONTINUED | OUTPATIENT
Start: 2024-05-04 | End: 2024-05-03 | Stop reason: HOSPADM

## 2024-05-03 RX ORDER — FENTANYL CITRATE 50 UG/ML
INJECTION, SOLUTION INTRAMUSCULAR; INTRAVENOUS AS NEEDED
Status: DISCONTINUED | OUTPATIENT
Start: 2024-05-03 | End: 2024-05-03 | Stop reason: HOSPADM

## 2024-05-03 RX ORDER — LIDOCAINE HYDROCHLORIDE 20 MG/ML
INJECTION, SOLUTION INFILTRATION; PERINEURAL AS NEEDED
Status: DISCONTINUED | OUTPATIENT
Start: 2024-05-03 | End: 2024-05-03 | Stop reason: HOSPADM

## 2024-05-03 RX ORDER — HEPARIN SODIUM 1000 [USP'U]/ML
INJECTION, SOLUTION INTRAVENOUS; SUBCUTANEOUS AS NEEDED
Status: DISCONTINUED | OUTPATIENT
Start: 2024-05-03 | End: 2024-05-03 | Stop reason: HOSPADM

## 2024-05-03 RX ORDER — CEFAZOLIN SODIUM 2 G/100ML
2 INJECTION, SOLUTION INTRAVENOUS ONCE
Status: COMPLETED | OUTPATIENT
Start: 2024-05-03 | End: 2024-05-03

## 2024-05-03 RX ORDER — ASPIRIN 81 MG/1
81 TABLET ORAL DAILY
Status: DISCONTINUED | OUTPATIENT
Start: 2024-05-03 | End: 2024-05-03 | Stop reason: HOSPADM

## 2024-05-03 RX ORDER — ONDANSETRON 4 MG/1
4 TABLET, ORALLY DISINTEGRATING ORAL EVERY 8 HOURS PRN
Status: DISCONTINUED | OUTPATIENT
Start: 2024-05-03 | End: 2024-05-03 | Stop reason: HOSPADM

## 2024-05-03 RX ORDER — MIDAZOLAM HYDROCHLORIDE 1 MG/ML
INJECTION, SOLUTION INTRAMUSCULAR; INTRAVENOUS AS NEEDED
Status: DISCONTINUED | OUTPATIENT
Start: 2024-05-03 | End: 2024-05-03 | Stop reason: HOSPADM

## 2024-05-03 RX ORDER — DOCUSATE SODIUM 100 MG/1
100 CAPSULE, LIQUID FILLED ORAL 2 TIMES DAILY
Status: DISCONTINUED | OUTPATIENT
Start: 2024-05-03 | End: 2024-05-03 | Stop reason: HOSPADM

## 2024-05-03 RX ORDER — PANTOPRAZOLE SODIUM 40 MG/10ML
40 INJECTION, POWDER, LYOPHILIZED, FOR SOLUTION INTRAVENOUS
Status: DISCONTINUED | OUTPATIENT
Start: 2024-05-04 | End: 2024-05-03 | Stop reason: HOSPADM

## 2024-05-03 RX ORDER — NAPROXEN SODIUM 220 MG/1
324 TABLET, FILM COATED ORAL ONCE
Status: COMPLETED | OUTPATIENT
Start: 2024-05-03 | End: 2024-05-03

## 2024-05-03 RX ORDER — ACETAMINOPHEN 325 MG/1
650 TABLET ORAL EVERY 6 HOURS PRN
Status: DISCONTINUED | OUTPATIENT
Start: 2024-05-03 | End: 2024-05-03 | Stop reason: HOSPADM

## 2024-05-03 RX ORDER — SODIUM CHLORIDE, SODIUM LACTATE, POTASSIUM CHLORIDE, CALCIUM CHLORIDE 600; 310; 30; 20 MG/100ML; MG/100ML; MG/100ML; MG/100ML
75 INJECTION, SOLUTION INTRAVENOUS CONTINUOUS
Status: DISCONTINUED | OUTPATIENT
Start: 2024-05-03 | End: 2024-05-03 | Stop reason: HOSPADM

## 2024-05-03 RX ORDER — CLOPIDOGREL BISULFATE 75 MG/1
75 TABLET ORAL DAILY
Status: DISCONTINUED | OUTPATIENT
Start: 2024-05-03 | End: 2024-05-03 | Stop reason: HOSPADM

## 2024-05-03 RX ORDER — CLOPIDOGREL BISULFATE 300 MG/1
TABLET, FILM COATED ORAL AS NEEDED
Status: DISCONTINUED | OUTPATIENT
Start: 2024-05-03 | End: 2024-05-03 | Stop reason: HOSPADM

## 2024-05-03 RX ORDER — TRAMADOL HYDROCHLORIDE 50 MG/1
50 TABLET ORAL EVERY 6 HOURS PRN
Status: DISCONTINUED | OUTPATIENT
Start: 2024-05-03 | End: 2024-05-03 | Stop reason: HOSPADM

## 2024-05-03 RX ADMIN — IOHEXOL 70 ML: 350 INJECTION, SOLUTION INTRAVENOUS at 11:50

## 2024-05-03 RX ADMIN — SODIUM CHLORIDE, POTASSIUM CHLORIDE, SODIUM LACTATE AND CALCIUM CHLORIDE 75 ML/HR: 600; 310; 30; 20 INJECTION, SOLUTION INTRAVENOUS at 11:18

## 2024-05-03 RX ADMIN — ASPIRIN 81 MG CHEWABLE TABLET 324 MG: 81 TABLET CHEWABLE at 11:18

## 2024-05-03 ASSESSMENT — PAIN SCALES - GENERAL: PAINLEVEL_OUTOF10: 0 - NO PAIN

## 2024-05-03 ASSESSMENT — COLUMBIA-SUICIDE SEVERITY RATING SCALE - C-SSRS
1. IN THE PAST MONTH, HAVE YOU WISHED YOU WERE DEAD OR WISHED YOU COULD GO TO SLEEP AND NOT WAKE UP?: NO
2. HAVE YOU ACTUALLY HAD ANY THOUGHTS OF KILLING YOURSELF?: NO
6. HAVE YOU EVER DONE ANYTHING, STARTED TO DO ANYTHING, OR PREPARED TO DO ANYTHING TO END YOUR LIFE?: NO

## 2024-05-03 ASSESSMENT — PAIN - FUNCTIONAL ASSESSMENT: PAIN_FUNCTIONAL_ASSESSMENT: 0-10

## 2024-05-03 NOTE — DISCHARGE SUMMARY
Discharge Diagnosis  Atrial fibrillation (Multi)    Is  Test Results Pending At Discharge  Pending Labs       No current pending labs.            Hospital Course   78 years old male who came for elective left atrial appendage closure.  The patient underwent successful procedure using 24 Watchman device.  No complications were observed during the procedure.    The patient will be monitored for a few hours for the groin site, then will be ambulated and an echocardiogram will be performed.  If all of those steps are reassuring patient will be discharged home later today.    The patient will continue dual antiplatelet therapy for 6 months, antibiotic prophylaxis for 6 months.  They will be discharged with follow-up CT and clinic visit instructions.    CTA/RAHEEM at 4 months is required to reassess device positioning and rule out any device leak or device related thrombus.        Home Medications     Medication List      START taking these medications     clopidogrel 75 mg tablet; Commonly known as: Plavix; Take 1 tablet (75   mg) by mouth once daily.     CONTINUE taking these medications     aspirin 81 mg chewable tablet   atorvastatin 40 mg tablet; Commonly known as: Lipitor   Januvia 50 mg tablet; Generic drug: SITagliptin phosphate   metFORMIN  mg 24 hr tablet; Commonly known as: Glucophage-XR   metoprolol tartrate 50 mg tablet; Commonly known as: Lopressor   nitroglycerin 0.4 mg SL tablet; Commonly known as: Nitrostat   OneTouch Ultra Test strip; Generic drug: blood sugar diagnostic   pantoprazole 40 mg EC tablet; Commonly known as: ProtoNix   valsartan 160 mg tablet; Commonly known as: Diovan     STOP taking these medications     apixaban 5 mg tablet; Commonly known as: Eliquis       Outpatient Follow-Up  Future Appointments   Date Time Provider Department Center   5/24/2024  9:00 AM JANE Hoang-CNP [id number] [place]   9/3/2024 10:15 AM ELY [id number] CT 1 [id number] [facility]UR[facility] [place]    9/25/2024  7:30  AM Ravindra Galvez MD [id number] [place]   10/11/2024  9:40 AM [facility] [id number] [facility] [place]   10/11/2024 10:40 AM Olga Rivers MD [id number] Israel Chamberlain MD

## 2024-05-03 NOTE — Clinical Note
Catheter exchanged with SHEATH, PINNACLE, 10 CM,  8FR INTRODUCER, 8FR XAVI, 2.5 CM DIALATOR. RFV, sheath from kit inserted

## 2024-05-03 NOTE — PROGRESS NOTES
Pharmacy Medication History Review    Chino Owusu is a 78 y.o. male admitted for Atrial fibrillation (Multi). Pharmacy reviewed the patient's [id number] medications and allergies for accuracy.      Medications CHANGED Patient says he is currently taking metoprolol tartrate 50 mg daily instead of 50 mg 2 times a day     The list below reflects the updated PTA list. Comments regarding how patient may be taking medications differently can be found in the Admit Orders Activity  Prior to Admission Medications   Prescriptions Last Dose Informant Patient Reported?   Januvia 50 mg tablet 5/2/2024 Self Yes   Sig: Take 1 tablet (50 mg) by mouth once daily.   OneTouch Ultra Test strip Unknown Self Yes   Sig: once daily.   apixaban (Eliquis) 5 mg tablet 5/2/2024 Self No   Sig: TAKE 1 TABLET BY MOUTH TWO TIMES A DAY   aspirin 81 mg chewable tablet 5/2/2024 Self Yes   Sig: Chew 1 tablet (81 mg) once daily.   atorvastatin (Lipitor) 40 mg tablet 5/3/2024 Self Yes   Sig: Take 1 tablet (40 mg) by mouth once daily. As directed   metFORMIN XR (Glucophage-XR) 500 mg 24 hr tablet 5/2/2024 Self Yes   Sig: Take 2 tablets (1,000 mg) by mouth once daily in the morning.   metoprolol tartrate (Lopressor) 50 mg tablet 5/2/2024 patient says he is taking once daily Self Yes   Sig: Take 1 tablet by mouth 2 times a day.   nitroglycerin (Nitrostat) 0.4 mg SL tablet Unknown Self Yes   Sig: Place 1 tablet (0.4 mg) under the tongue every 5 minutes if needed for chest pain. Up to 3 doses.   pantoprazole (ProtoNix) 40 mg EC tablet 5/3/2024 Self Yes   Sig: Take 1 tablet (40 mg) by mouth once daily.   valsartan (Diovan) 160 mg tablet 5/2/2024 Self Yes   Sig: Take 1 tablet (160 mg) by mouth once daily.      Facility-Administered Medications: None        The list below reflects the updated allergy list. Please review each documented allergy for additional clarification and justification.  Allergies  Reviewed by Brice Neumann RN on 5/3/2024    No Known Allergies         Patient declines M2B at discharge.     Sources used to complete the med history include out patient fill history, OARRS, and patient interview along with 4/10/24       Below are additional concerns with the patient's PTA list.        Trace Gan [facility]  Transitions of Care Clinical Pharmacist  Please reach out via Epic Chat for questions, if no response call  [phone] or [facility]

## 2024-05-03 NOTE — POST-PROCEDURE NOTE
Physician Transition of Care Summary  Invasive Cardiovascular Lab    Procedure Date: 5/3/2024  Attending:    * Zeke Read - Primary  Resident/Fellow/Other Assistant: Surgeons and Role:     * Olive Chamberlain MD - Fellow    Indications:   Pre-op Diagnosis     * Atrial fibrillation, unspecified type (Multi) [I48.91]    Post-procedure diagnosis:   Post-op Diagnosis     * Atrial fibrillation, unspecified type (Multi) [I48.91]    Procedure(s):   LAAO (Left Atrial Appendage Occlusion)  31126 - [place] PERQ CLSR TCAT L ATR APNDGE W/ENDOCARDIAL IMPLNT    [place] PERQ CLSR TCAT L ATR APNDGE W/ENDOCARDIAL IMPLNT [27235]        Description of the Procedure:   S/p NANCY closure    Access: Dual right femoral vein access  Closure:               Primary : Perclose               Secondary: Vascade    Device: After confirmation of the device position on fluoroscopy and ICE, anchor with a tug test, compression and seal a 24 mm Watchman was successfully deployed without any immediate complications.     No effusion on ICE was present pre and post watchman deployment.     Recommendations:   DAPT   CTA/RAHEEM at 4 months is required to reassess device positioning and rule out any device leak or device related thrombus.  Tele  access site monitoring.   TTE   Likely discharge home today once recovery and monitoring period is complete.          Stents/Implants:   Implants       Other Cardiac Implant    Device, Closure, 24mm Watchman Flx Laac - [id number] - Implanted        Inventory item: DEVICE, CLOSURE, 24MM WATCHMAN FLX LAAC Model/Cat number: [id number]    : Playroom Lot number: [id number]    Device identifier: [id number]        As of 5/3/2024       Status: Implanted                                Estimated Blood Loss:   15 mL    Anesthesia: Moderate Sedation Anesthesia Staff: No anesthesia staff entered.    Any Specimen(s) Removed:   Order Name Source Comment Collection Info Order Time   BASIC METABOLIC PANEL Blood, Venous    5/3/2024 11:16 AM     [email or web address]   Immediate        CBC WITH AUTO DIFFERENTIAL Blood, Venous   5/3/2024 11:16 AM     [facility]   Immediate        PROTIME-INR Blood, Venous   5/3/2024 11:16 AM     [facility]   Immediate                Electronically signed by: Olive Chamberlain MD, 5/3/2024 2:22 PM

## 2024-05-03 NOTE — H&P
Cardio: Dr. Rivers        The patient is a 78-year-old male with coronary artery disease including history of non-ST elevation myocardial infarction and PCI with drug-eluting stents placed to the LAD and right coronary artery, hypertension history of high degree AV block status post permanent pacemaker [employer] dual-chamber device, chronic kidney disease. The patient has normal LVEF.  He has mild mitral regurgitation but no history of significant valvular heart disease.     Patient's medical history is notable for history of upper GI bleed in 2023 secondary to large Katiuska Marquez tear at the GE junction status post 3 clips.  Patient required transfusion of 2 units of FFP and 2 units of packed red blood cells.     He has been resumed on Eliquis and also requires concomitant antiplatelet medications given his extensive history of coronary artery disease and PCI.     Fortunately his not had any recurrent GI bleeding.  However, given the patient's significant GI bleeding,  the patient is here for left atrial appendage closure for stroke risk reduction.         ROS:  Constitutional: no fatigue  Eyes: no acute eye problems, no blurred vision, no diplopia, no eye pain  ENT:  no acute hearing loss, no earache, no sore throat  Cardiovascular: nodyspnea on exertion, no chest pain  Respiratory: no chronic cough, not coughing up sputum, no wheezing that is consistent with asthma  Gastrointestinal: no acute bowel complaints  Musculoskeletal: no acute arthralgias, no acute myalgias, no acute joint swelling  Skin: no skin rashes, no change in skin color and pigmentation, no skin lesions and no skin lumps.   Neurological: no headaches, no dizziness, no tingling, no fainting and no limb weakness.   Psychiatric:  no suicidal ideation, no confusion, no personality change and no emotional problems.   Hematologic/Lymphatic: no bleeding issues, other then mentioned in HPI  All other systems have been reviewed and are negative for  complaint.      Physical Exam:      Visit Vitals  Smoking Status Former         Constitutional: alert and in no acute distress.   Eyes: no erythema, swelling or discharge from the eye .   Ears, Nose, Mouth, and Throat: external inspection of ears and nose is normal , lips, teeth, and gums are normal with good dentition  and oropharynx normal with no erythema, edema, exudate or lesions .   Neck: neck is supple, symmetric, trachea midline, no masses  and no thyromegaly .   Pulmonary: no increased work of breathing or signs of respiratory distress , lungs clear to auscultation. , normal percussion of chest  and chest palpation normal .   Cardiovascular: RRR, no murmur,  no leg edema, non-displaced PMI, no S3 or S4  Abdomen: abdomen non-tender, no masses  and no hepatomegaly .           Skin:  no skin lesions          Neurologic: non-focal neurologic examination.      Psychiatric judgment and insight is normal , oriented to person, place and time , normal mood and affect .         Labs:           Results for orders placed or performed in visit on 03/30/23   Protime-INR   Result Value Ref Range     Protime 19.2 (H) 9.8 - 13.4 sec     INR 1.7 (H) 0.9 - 1.1            Medications:          Current Outpatient Medications   Medication Instructions    apixaban (Eliquis) 5 mg tablet TAKE 1 TABLET BY MOUTH TWO TIMES A DAY    aspirin 81 mg chewable tablet 1 tablet, oral, Daily    atorvastatin (Lipitor) 40 mg tablet 1 tablet, oral, Daily, As directed    clopidogrel (Plavix) 75 mg tablet 1 tablet, oral, Daily    isosorbide mononitrate ER (Imdur) 30 mg 24 hr tablet 1 tablet, oral, Daily    Januvia 50 mg tablet 1 tablet, oral, Daily    Jardiance 10 mg oral, Jardiance 10 mg    metoprolol tartrate (LOPRESSOR) 75 mg, oral, 2 times daily    nitroglycerin (NITROSTAT) 0.4 mg, sublingual, Every 5 min PRN, Up to 3 doses.    OneTouch Ultra Test strip Daily    pantoprazole (PROTONIX) 40 mg, oral, Daily    valsartan (Diovan) 160 mg tablet 1  tablet, oral, Daily    warfarin (COUMADIN) 2 mg, oral, Daily or as directed by [facility] protime dept            Assessment:       This is a 78-year-old male with multiple medical issues including extensive history of coronary artery disease and PCI, paroxysmal A-fib, and history of significant upper GI bleed secondary to Katiuska-Marquez tear.     The CHADS-VASC score is 3 and HAS-BLED score is 3. The patient is at increased risk of both bleeding and stroke.  As such the patient is a reasonable candidate for consideration of left atrial appendage occluder placement.     Proceed with LAAC     The risks discussed included but were not limited to vascular complications, sedation related complications, risk of MI, CVA, device embolization, pericardial tamponade and death. The patient verbalized understanding and I believe he is inclined to proceed.  The patient would like further discussion with his family.  He has a grandson who is a nurse practitioner and he would like to get his input on the procedure as well.            In addition the patient will need a CT scan 4 months after the procedure, to evaluate the device for position, thrombus and zoya-device leak.. Following device implant, strategy will be for dual antiplatelet therapy with aspirin and clopidogrel for 6 months then aspirin for life.

## 2024-05-03 NOTE — Clinical Note
Sheath was exchanged in the right femoral vein with ACCESS KIT, S-GOGO MINI, 4FR 10CM 0.018IN 40CM, NT/PT, ECHO ENHANCE NEEDLE. Ultrasound guidance was used.

## 2024-05-08 LAB
ATRIAL RATE: 80 BPM
P OFFSET: 169 MS
P ONSET: 141 MS
PR INTERVAL: 204 MS
Q ONSET: 188 MS
QRS COUNT: 14 BEATS
QRS DURATION: 178 MS
QT INTERVAL: 450 MS
QTC CALCULATION(BAZETT): 519 MS
QTC FREDERICIA: 495 MS
R AXIS: -58 DEGREES
T AXIS: 139 DEGREES
T OFFSET: 413 MS
VENTRICULAR RATE: 80 BPM

## 2024-05-24 ENCOUNTER — OFFICE VISIT (OUTPATIENT)
Dept: CARDIOLOGY | Facility: CLINIC | Age: 79
End: 2024-05-24
Payer: MEDICARE

## 2024-05-24 VITALS
WEIGHT: 178 LBS | DIASTOLIC BLOOD PRESSURE: 82 MMHG | HEIGHT: 70 IN | BODY MASS INDEX: 25.48 KG/M2 | SYSTOLIC BLOOD PRESSURE: 138 MMHG | HEART RATE: 80 BPM

## 2024-05-24 DIAGNOSIS — I10 ESSENTIAL HYPERTENSION: ICD-10-CM

## 2024-05-24 DIAGNOSIS — I25.10 CAD S/P PERCUTANEOUS CORONARY ANGIOPLASTY: Primary | ICD-10-CM

## 2024-05-24 DIAGNOSIS — I48.0 PAROXYSMAL ATRIAL FIBRILLATION (MULTI): ICD-10-CM

## 2024-05-24 DIAGNOSIS — R00.2 PALPITATIONS: ICD-10-CM

## 2024-05-24 DIAGNOSIS — E78.2 HYPERLIPIDEMIA, MIXED: ICD-10-CM

## 2024-05-24 DIAGNOSIS — Z98.61 CAD S/P PERCUTANEOUS CORONARY ANGIOPLASTY: Primary | ICD-10-CM

## 2024-05-24 DIAGNOSIS — Z95.0 PACEMAKER: ICD-10-CM

## 2024-05-24 DIAGNOSIS — Z95.818 PRESENCE OF WATCHMAN LEFT ATRIAL APPENDAGE CLOSURE DEVICE: ICD-10-CM

## 2024-05-24 DIAGNOSIS — K25.4 CHRONIC GASTRIC ULCER WITH HEMORRHAGE: ICD-10-CM

## 2024-05-24 DIAGNOSIS — I45.10 RIGHT BUNDLE BRANCH BLOCK (RBBB): ICD-10-CM

## 2024-05-24 DIAGNOSIS — I44.2 COMPLETE HEART BLOCK (MULTI): ICD-10-CM

## 2024-05-24 DIAGNOSIS — K22.6 MALLORY-WEISS TEAR: ICD-10-CM

## 2024-05-24 PROBLEM — I48.91 ATRIAL FIBRILLATION, UNSPECIFIED TYPE (MULTI): Status: RESOLVED | Noted: 2024-05-02 | Resolved: 2024-05-24

## 2024-05-24 PROBLEM — I48.91 ATRIAL FIBRILLATION (MULTI): Status: RESOLVED | Noted: 2024-04-11 | Resolved: 2024-05-24

## 2024-05-24 PROCEDURE — 3075F SYST BP GE 130 - 139MM HG: CPT | Performed by: NURSE PRACTITIONER

## 2024-05-24 PROCEDURE — 1111F DSCHRG MED/CURRENT MED MERGE: CPT | Performed by: NURSE PRACTITIONER

## 2024-05-24 PROCEDURE — 1036F TOBACCO NON-USER: CPT | Performed by: NURSE PRACTITIONER

## 2024-05-24 PROCEDURE — 1159F MED LIST DOCD IN RCRD: CPT | Performed by: NURSE PRACTITIONER

## 2024-05-24 PROCEDURE — 1160F RVW MEDS BY RX/DR IN RCRD: CPT | Performed by: NURSE PRACTITIONER

## 2024-05-24 PROCEDURE — 3079F DIAST BP 80-89 MM HG: CPT | Performed by: NURSE PRACTITIONER

## 2024-05-24 PROCEDURE — 99214 OFFICE O/P EST MOD 30 MIN: CPT | Performed by: NURSE PRACTITIONER

## 2024-05-24 NOTE — PROGRESS NOTES
"CARDIOLOGY OFFICE VISIT      CHIEF COMPLAINT  Chief Complaint   Patient presents with    Atrial Fibrillation    Hospital Follow-up     Watchman follow up   Chief complaint: \"I do get short of breath sometimes with walking fast.\"    HISTORY OF PRESENT ILLNESS  HPI  History: The patient is a 78-year-old  male who is followed for coronary artery disease status post multivessel angioplasty with stent deployment, remote non-ST elevation MI, hypertension, dyslipidemia, diabetes mellitus, second-degree AV block status post dual-chamber pacemaker implant on August 10, 2022 and paroxysmal atrial fibrillation controlled with beta-blockade.  He had been anticoagulated with Eliquis but experienced a significant gastrointestinal bleed secondary to large Katiuska-Marquez tear at the GE junction with clot requiring placement of 3 endoclips and transfusion with 2 units of packed red blood cells and 3 units of platelets.  He underwent implantation of a 24 mm watchman left atrial appendage filter by Dr. Read on May 3, 2024 and presents the office today for follow-up evaluation.  He states that he is doing well.  He does experience some shortness of breath with walking at a fast pace.  He denies orthopnea, abdominal distention, lower extremity swelling.  Chest pain, palpitations, dizziness or lightheadedness.  Past Medical History  Past Medical History:   Diagnosis Date    Overweight 09/02/2022    Overweight with body mass index (BMI) of 29 to 29.9 in adult       Social History  Social History     Tobacco Use    Smoking status: Former     Types: Cigarettes    Smokeless tobacco: Never   Substance Use Topics    Alcohol use: Not Currently    Drug use: Never       Family History     Family History   Problem Relation Name Age of Onset    Other (arteriosclerotic cardio disease) Mother      Other (arteriosclerotic cardio disease) Father          Allergies:  No Known Allergies     Outpatient Medications:  Current Outpatient Medications "   Medication Instructions    aspirin 81 mg chewable tablet 1 tablet, oral, Daily    atorvastatin (Lipitor) 40 mg tablet 1 tablet, oral, Daily, As directed    clopidogrel (PLAVIX) 75 mg, oral, Daily    Januvia 50 mg tablet 1 tablet, oral, Daily    metFORMIN XR (GLUCOPHAGE-XR) 1,000 mg, oral, Every morning    metoprolol tartrate (LOPRESSOR) 50 mg, oral, 2 times daily    nitroglycerin (NITROSTAT) 0.4 mg, sublingual, Every 5 min PRN, Up to 3 doses.    OneTouch Ultra Test strip Daily    pantoprazole (PROTONIX) 40 mg, oral, Daily    valsartan (Diovan) 160 mg tablet 1 tablet, oral, Daily          REVIEW OF SYSTEMS  Review of Systems   All other systems reviewed and are negative.        VITALS  Vitals:    05/24/24 0850   BP: 138/82   Pulse: 80       PHYSICAL EXAM  Vitals and nursing note reviewed.   Constitutional:       Appearance: Normal appearance.   HENT:      Head: Normocephalic.   Neck:      Vascular: No JVD.   Cardiovascular:      Rate and Rhythm: Normal rate and regular rhythm.      Pulses: Normal pulses.      Heart sounds: Normal heart sounds.   Pulmonary:      Effort: Pulmonary effort is normal.      Breath sounds: Normal breath sounds.   Abdominal:      General: Bowel sounds are normal.      Palpations: Abdomen is soft.   Musculoskeletal:         General: Normal range of motion.      Cervical back: Normal range of motion.   Skin:     General: Skin is warm and dry.   Neurological:      General: No focal deficit present.      Mental Status: She is alert and oriented to person, place, and time.      Motor: Motor function is intact.   Psychiatric:         Attention and Perception: Attention and perception normal.         Mood and Affect: Mood and affect normal.         Speech: Speech normal.         Behavior: Behavior normal. Behavior is cooperative.         Thought Content: Thought content normal.         Cognition and Memory: Cognition and memory normal.     Labs and testing: Twelve-lead EKG reveals atrial and  ventricular pacing at 80 bpm.  QRS durations 182 ms,  ms, QTc 514 ms.      ASSESSMENT AND PLAN       Clinical impressions:  1. Coronary artery disease status post remote angioplasty with stent deployment with recent left heart catheterization dated August 8, 2022 revealing less than 10% left main, 10 to 30% proximal LAD, 80% mid LAD, 50% distal LAD, patent LAD stents, 50% ostial circumflex, 75% proximal second OMB, 10 to 30% RCA with patent stents. Status post PTCA with drug-eluting stent to the mid LAD on August 18, 2022.  2. Second-degree AV block, Mobitz type II progressing to complete heart block status post dual-chamber pacemaker implant ([employer] Jude Assurity MRI ) on August 10, 2022.  3. Paroxysmal atrial fibrillation per device interrogation on beta-blockade and not anticoagulated due to history of Katiuska-Marquez tear with significant gastrointestinal bleed.  Status postplacement of a 24 mm watchman left atrial appendage filter on May 3, 2024 for prophylaxis from thromboembolic event.  4. Dyslipidemia on statin.  5. Hypertension, controlled with a blood pressure today of 138/82.  6. Remote tobacco use.  7. Diabetes mellitus.  8. Overweight with a BMI of 25.91.  9. Upper GI bleed with upper GI endoscopy dated May 26, 2023 revealing a large Katiuska-Marquez tear at the GE junction with clot status post 3 clips.  10. Left ventricular ejection fraction of 70 to 75% per 2D echocardiogram dated May 26, 2023.  11. Valvular heart disease consisting of moderate mitral annular calcification, mild MR and trace TR per 2D echocardiogram dated May 26, 2023.    Recommendations:  1.  Continue current medications as prescribed.  2.  Obtain a CT per watchman protocol on September 3, 2024 at 10:15 AM.  3.  Follow-up in office with Dr. Galvez on September 25, 2024 at 7:30 AM as scheduled.  4.  Obtain a device interrogation on October 1, 2024 at 9:40 AM and office visit with Dr. Rivers at 10:40 AM as scheduled.  5.   Continue lifestyle modifications as discussed.    Evaluation and note by Rose Esposito, CNP  **Please excuse any errors in grammar or translation related to this dictation.  Voice recognition software was utilized to prepare this document.**

## 2024-05-29 ENCOUNTER — TELEPHONE (OUTPATIENT)
Dept: CARDIOLOGY | Facility: CLINIC | Age: 79
End: 2024-05-29
Payer: MEDICARE

## 2024-05-29 NOTE — TELEPHONE ENCOUNTER
Patient called the office after looking at his medications at home. He states that his Metoprolol Tartarte 50mg bottle does say take 1 tablet twice daily. He reports that he is only taking one tablet daily.  Patient wants to know if you recommend that he start taking 1 tablet twice daily? Message forwarded to Rose Esposito CNP for further review and recommendations.

## 2024-05-31 NOTE — TELEPHONE ENCOUNTER
Called with Rose Esposito CNP recommendations to stay on the one tablet of the metoprolol 50mg daily, monitor blood pressure and contact office in one week.  Patient verbalized understanding

## 2024-06-26 DIAGNOSIS — K25.4 CHRONIC GASTRIC ULCER WITH HEMORRHAGE: Primary | ICD-10-CM

## 2024-06-27 RX ORDER — PANTOPRAZOLE SODIUM 40 MG/1
40 TABLET, DELAYED RELEASE ORAL DAILY
Qty: 90 TABLET | Refills: 3 | Status: SHIPPED | OUTPATIENT
Start: 2024-06-27

## 2024-06-27 NOTE — TELEPHONE ENCOUNTER
Refused medication as it is not a cardiac medication. Sent to [facility] NP to sign off.

## 2024-07-16 ENCOUNTER — HOSPITAL ENCOUNTER (OUTPATIENT)
Dept: CARDIOLOGY | Facility: HOSPITAL | Age: 79
Discharge: HOME | End: 2024-07-16
Payer: MEDICARE

## 2024-07-16 DIAGNOSIS — I49.5 SYNCOPE DUE TO SICK SINUS SYNDROME (MULTI): ICD-10-CM

## 2024-07-16 DIAGNOSIS — I48.0 PAROXYSMAL ATRIAL FIBRILLATION (MULTI): ICD-10-CM

## 2024-07-16 DIAGNOSIS — R55 SYNCOPE DUE TO SICK SINUS SYNDROME (MULTI): ICD-10-CM

## 2024-07-16 DIAGNOSIS — Z95.0 PACEMAKER: ICD-10-CM

## 2024-07-16 PROCEDURE — 93296 REM INTERROG EVL PM/IDS: CPT

## 2024-08-21 DIAGNOSIS — I25.10 CORONARY ARTERY DISEASE INVOLVING NATIVE HEART WITHOUT ANGINA PECTORIS, UNSPECIFIED VESSEL OR LESION TYPE: ICD-10-CM

## 2024-08-21 DIAGNOSIS — I44.2 COMPLETE HEART BLOCK (HCC): ICD-10-CM

## 2024-08-22 RX ORDER — ISOSORBIDE MONONITRATE 30 MG/1
30 TABLET, EXTENDED RELEASE ORAL DAILY
Qty: 90 TABLET | Refills: 3 | Status: SHIPPED | OUTPATIENT
Start: 2024-08-22

## 2024-08-22 RX ORDER — WARFARIN SODIUM 2 MG/1
TABLET ORAL
Qty: 90 TABLET | Refills: 3 | Status: SHIPPED | OUTPATIENT
Start: 2024-08-22

## 2024-08-28 NOTE — TELEPHONE ENCOUNTER
Comments:     Last Office Visit (last PCP visit):   12/11/2023    Next Visit Date:  Future Appointments   Date Time Provider Department Center   11/22/2024  8:15 AM Omega Love MD [facility] DEP       **If hasn't been seen in over a year OR hasn't followed up according to last diabetes/ADHD visit, make appointment for patient before sending refill to provider.    Rx requested:  Requested Prescriptions     Pending Prescriptions Disp Refills    metFORMIN (GLUCOPHAGE-XR) 500 MG extended release tablet [Pharmacy Med Name: metFORMIN HCl ER Oral Tablet Extended Release 24 Hour 500 MG] 180 tablet 0     Sig: TAKE TWO TABLETS BY MOUTH DAILY WITH BREAKFAST

## 2024-08-29 RX ORDER — METFORMIN HCL 500 MG
1000 TABLET, EXTENDED RELEASE 24 HR ORAL
Qty: 180 TABLET | Refills: 0 | Status: SHIPPED | OUTPATIENT
Start: 2024-08-29

## 2024-09-03 ENCOUNTER — HOSPITAL ENCOUNTER (OUTPATIENT)
Dept: RADIOLOGY | Facility: CLINIC | Age: 79
Discharge: HOME | End: 2024-09-03
Payer: MEDICARE

## 2024-09-03 ENCOUNTER — TELEMEDICINE (OUTPATIENT)
Dept: FAMILY MEDICINE CLINIC | Age: 79
End: 2024-09-03

## 2024-09-03 DIAGNOSIS — Z00.00 MEDICARE ANNUAL WELLNESS VISIT, SUBSEQUENT: Primary | ICD-10-CM

## 2024-09-03 DIAGNOSIS — I48.91 ATRIAL FIBRILLATION, UNSPECIFIED TYPE (MULTI): ICD-10-CM

## 2024-09-03 LAB
CREAT SERPL-MCNC: 1.5 MG/DL (ref 0.6–1.3)
GFR SERPL CREATININE-BSD FRML MDRD: 47 ML/MIN/1.73M*2

## 2024-09-03 PROCEDURE — 82565 ASSAY OF CREATININE: CPT

## 2024-09-03 PROCEDURE — 75572 CT HRT W/3D IMAGE: CPT

## 2024-09-03 PROCEDURE — 2550000001 HC RX 255 CONTRASTS: Performed by: INTERNAL MEDICINE

## 2024-09-03 SDOH — ECONOMIC STABILITY: FOOD INSECURITY: WITHIN THE PAST 12 MONTHS, THE FOOD YOU BOUGHT JUST DIDN'T LAST AND YOU DIDN'T HAVE MONEY TO GET MORE.: NEVER TRUE

## 2024-09-03 SDOH — ECONOMIC STABILITY: INCOME INSECURITY: HOW HARD IS IT FOR YOU TO PAY FOR THE VERY BASICS LIKE FOOD, HOUSING, MEDICAL CARE, AND HEATING?: NOT HARD AT ALL

## 2024-09-03 SDOH — ECONOMIC STABILITY: FOOD INSECURITY: WITHIN THE PAST 12 MONTHS, YOU WORRIED THAT YOUR FOOD WOULD RUN OUT BEFORE YOU GOT MONEY TO BUY MORE.: NEVER TRUE

## 2024-09-03 ASSESSMENT — PATIENT HEALTH QUESTIONNAIRE - PHQ9
SUM OF ALL RESPONSES TO PHQ QUESTIONS 1-9: 0
2. FEELING DOWN, DEPRESSED OR HOPELESS: NOT AT ALL
1. LITTLE INTEREST OR PLEASURE IN DOING THINGS: NOT AT ALL
SUM OF ALL RESPONSES TO PHQ QUESTIONS 1-9: 0
SUM OF ALL RESPONSES TO PHQ9 QUESTIONS 1 & 2: 0

## 2024-09-03 NOTE — PATIENT INSTRUCTIONS

## 2024-09-03 NOTE — PROGRESS NOTES
Medicare Annual Wellness Visit    Albert Barker is here for Medicare AWV    Assessment & Plan   Medicare annual wellness visit, subsequent    Recommendations for Preventive Services Due: see orders and patient instructions/AVS.  Recommended screening schedule for the next 5-10 years is provided to the patient in written form: see Patient Instructions/AVS.     Return in 1 year (on 9/3/2025) for Medicare AWV.     Subjective       Patient's complete Health Risk Assessment and screening values have been reviewed and are found in Flowsheets. The following problems were reviewed today and where indicated follow up appointments were made and/or referrals ordered.    No Positive Risk Factors identified today.                       Dentist Screen:  Have you seen the dentist within the past year?: (!) No    Intervention:  Advised to schedule with their dentist                Objective    Patient-Reported Vitals  No data recorded             No Known Allergies  Prior to Visit Medications    Medication Sig Taking? Authorizing Provider   metFORMIN (GLUCOPHAGE-XR) 500 MG extended release tablet TAKE TWO TABLETS BY MOUTH DAILY WITH BREAKFAST Yes Omega Love MD   JANTOVEN 2 MG tablet TAKE 1 TABLET BY MOUTH ONCE  DAILY OR AS DIRECTED BY [facility]  PROTIME DEPT Yes Omega Love MD   isosorbide mononitrate (IMDUR) 30 MG extended release tablet Take 1 tablet by mouth daily Yes Omega Love MD   blood glucose test strips (ONETOUCH ULTRA) strip USE TO TEST ONCE DAILY Yes Omega Love MD   metoprolol tartrate (LOPRESSOR) 50 MG tablet TAKE 1 TABLET BY MOUTH TWICE  DAILY Yes Omega Love MD   amoxicillin (AMOXIL) 500 MG capsule Take 1 capsule by mouth 2 times daily Yes Omega Love MD   atorvastatin (LIPITOR) 40 MG tablet TAKE 1 TABLET BY MOUTH DAILY Yes Omega Love MD   SITagliptin (JANUVIA) 50 MG tablet TAKE 1 TABLET BY MOUTH DAILY Yes Omega Love MD   apixaban (ELIQUIS) 5 MG TABS tablet Take by mouth Yes ProviderJaqui MD

## 2024-09-25 ENCOUNTER — APPOINTMENT (OUTPATIENT)
Dept: CARDIOLOGY | Facility: CLINIC | Age: 79
End: 2024-09-25
Payer: MEDICARE

## 2024-09-25 VITALS
SYSTOLIC BLOOD PRESSURE: 160 MMHG | HEART RATE: 88 BPM | DIASTOLIC BLOOD PRESSURE: 94 MMHG | BODY MASS INDEX: 25.43 KG/M2 | WEIGHT: 174.7 LBS

## 2024-09-25 DIAGNOSIS — E78.2 HYPERLIPIDEMIA, MIXED: ICD-10-CM

## 2024-09-25 DIAGNOSIS — I10 ESSENTIAL HYPERTENSION: ICD-10-CM

## 2024-09-25 DIAGNOSIS — I25.2 PAST MYOCARDIAL INFARCTION: ICD-10-CM

## 2024-09-25 DIAGNOSIS — I25.10 CAD S/P PERCUTANEOUS CORONARY ANGIOPLASTY: ICD-10-CM

## 2024-09-25 DIAGNOSIS — Z95.0 PACEMAKER: ICD-10-CM

## 2024-09-25 DIAGNOSIS — Z87.891 FORMER SMOKER: ICD-10-CM

## 2024-09-25 DIAGNOSIS — N18.30 STAGE 3 CHRONIC KIDNEY DISEASE, UNSPECIFIED WHETHER STAGE 3A OR 3B CKD (MULTI): ICD-10-CM

## 2024-09-25 DIAGNOSIS — Z95.818 PRESENCE OF WATCHMAN LEFT ATRIAL APPENDAGE CLOSURE DEVICE: ICD-10-CM

## 2024-09-25 DIAGNOSIS — Z98.61 CAD S/P PERCUTANEOUS CORONARY ANGIOPLASTY: ICD-10-CM

## 2024-09-25 PROCEDURE — 3077F SYST BP >= 140 MM HG: CPT | Performed by: INTERNAL MEDICINE

## 2024-09-25 PROCEDURE — 1036F TOBACCO NON-USER: CPT | Performed by: INTERNAL MEDICINE

## 2024-09-25 PROCEDURE — 1159F MED LIST DOCD IN RCRD: CPT | Performed by: INTERNAL MEDICINE

## 2024-09-25 PROCEDURE — 3080F DIAST BP >= 90 MM HG: CPT | Performed by: INTERNAL MEDICINE

## 2024-09-25 PROCEDURE — 99214 OFFICE O/P EST MOD 30 MIN: CPT | Performed by: INTERNAL MEDICINE

## 2024-09-25 RX ORDER — WARFARIN SODIUM 2 MG/1
TABLET ORAL
COMMUNITY
Start: 2022-11-14 | End: 2024-09-25 | Stop reason: ALTCHOICE

## 2024-09-25 NOTE — PATIENT INSTRUCTIONS
Patient to follow up in 1 year with Dr. Ravindra Forbes MD      No changes today.   Continue same medications and treatments.   Patient educated on proper medication use.   Patient educated on risk factor modification.   Please bring any lab results from other providers / physicians to your next appointment.     Please bring all medicines, vitamins, and herbal supplements with you when you come to the office.     Prescriptions will not be filled unless you are compliant with your follow up appointments or have a follow up appointment scheduled as per instruction of your physician. Refills should be requested at the time of your visit.    ILatrell RN am scribing for and in the presence of Dr. Ravindra Forbes MD

## 2024-09-25 NOTE — PROGRESS NOTES
CARDIOLOGY OFFICE VISIT      CHIEF COMPLAINT      HISTORY OF PRESENT ILLNESS  The patient states he is doing well.  He denies chest discomfort or symptoms of myocardial ischemia.  He denies dyspnea with activities.  He denies palpitations and syncope.  He has Watchman performed in May without any problems.  He had a recent CT scan done which demonstrated the watchman to be in good position with no evidence of leak.  I told him that he can discontinue his Plavix the beginning of November, 6 months after his watchman implant.  He denies any problem with his current medication.    IMPRESSION:   1. Coronary artery disease, no angina.  2. Remote multivessel percutaneous coronary intervention, most recent PCI GEMMA of LAD on 8/18/2022.  3. Remote non-ST-segment elevation myocardial infarction.  4. Essential hypertension.  5. Mixed hyperlipidemia.  6. Former smoker.  7. Overweight  8. Chronic kidney disease, stage III  9. Permanent pacemaker for complete heart block  10. Paroxysmal Atrial Fibrillation, s/p Watchman LAAO May 2024     Please excuse any errors in grammar or translation related to this dictation. Voice recognition software was utilized to prepare this document.     Past Medical History  Past Medical History:   Diagnosis Date    Overweight 09/02/2022    Overweight with body mass index (BMI) of 29 to 29.9 in adult       Social History  Social History     Tobacco Use    Smoking status: Former     Types: Cigarettes    Smokeless tobacco: Never   Substance Use Topics    Alcohol use: Not Currently    Drug use: Never       Family History     Family History   Problem Relation Name Age of Onset    Other (arteriosclerotic cardio disease) Mother      Other (arteriosclerotic cardio disease) Father          Allergies:  No Known Allergies     Outpatient Medications:  Current Outpatient Medications   Medication Instructions    aspirin 81 mg chewable tablet 1 tablet, oral, Daily    atorvastatin (Lipitor) 40 mg tablet 1 tablet,  oral, Daily, As directed    clopidogrel (PLAVIX) 75 mg, oral, Daily    Januvia 50 mg tablet 1 tablet, oral, Daily    metFORMIN XR (GLUCOPHAGE-XR) 500 mg, oral, Every morning    metoprolol tartrate (LOPRESSOR) 50 mg, oral, Daily    nitroglycerin (NITROSTAT) 0.4 mg, sublingual, Every 5 min PRN, Up to 3 doses.    OneTouch Ultra Test strip Daily    pantoprazole (PROTONIX) 40 mg, oral, Daily    valsartan (Diovan) 160 mg tablet 1 tablet, oral, Daily          REVIEW OF SYSTEMS  Review of Systems   All other systems reviewed and are negative.        VITALS  Vitals:    09/25/24 0724   BP: (!) 160/94   Pulse: 88       PHYSICAL EXAM  Vitals reviewed.   Constitutional:       Appearance: Normal and healthy appearance. Well-developed and not in distress.   Eyes:      Conjunctiva/sclera: Conjunctivae normal.      Pupils: Pupils are equal, round, and reactive to light.   Neck:      Vascular: No JVR. JVD normal.   Pulmonary:      Effort: Pulmonary effort is normal.      Breath sounds: Normal breath sounds. No wheezing. No rhonchi. No rales.   Chest:      Chest wall: Not tender to palpatation.   Cardiovascular:      PMI at left midclavicular line. Normal rate. Regular rhythm. Normal S1. Normal S2.       Murmurs: There is no murmur.      No gallop.  No click. No rub.   Pulses:     Intact distal pulses.   Edema:     Peripheral edema absent.   Abdominal:      Tenderness: There is no abdominal tenderness.   Musculoskeletal: Normal range of motion.         General: No tenderness.      Cervical back: Normal range of motion. Skin:     General: Skin is warm and dry.   Neurological:      General: No focal deficit present.      Mental Status: Alert and oriented to person, place and time.   Psychiatric:         Behavior: Behavior is cooperative.           ASSESSMENT AND PLAN  Diagnoses and all orders for this visit:  CAD S/P percutaneous coronary angioplasty  Essential hypertension  Hyperlipidemia, mixed  Stage 3 chronic kidney disease,  unspecified whether stage 3a or 3b CKD (Multi)  Pacemaker  Past myocardial infarction  Presence of Watchman left atrial appendage closure device  BMI 25.0-25.9,adult  Former smoker      [unfilled]

## 2024-09-26 DIAGNOSIS — I25.10 CORONARY ARTERY DISEASE INVOLVING NATIVE HEART WITHOUT ANGINA PECTORIS, UNSPECIFIED VESSEL OR LESION TYPE: ICD-10-CM

## 2024-09-27 RX ORDER — ATORVASTATIN CALCIUM 40 MG/1
40 TABLET, FILM COATED ORAL DAILY
Qty: 90 TABLET | Refills: 3 | Status: SHIPPED | OUTPATIENT
Start: 2024-09-27

## 2024-10-09 DIAGNOSIS — I10 ESSENTIAL HYPERTENSION: ICD-10-CM

## 2024-10-09 DIAGNOSIS — E11.65 TYPE 2 DIABETES MELLITUS WITH HYPERGLYCEMIA, WITHOUT LONG-TERM CURRENT USE OF INSULIN (HCC): ICD-10-CM

## 2024-10-10 RX ORDER — VALSARTAN 160 MG/1
160 TABLET ORAL DAILY
Qty: 90 TABLET | Refills: 3 | Status: SHIPPED | OUTPATIENT
Start: 2024-10-10

## 2024-10-10 NOTE — TELEPHONE ENCOUNTER
Comments:     Last Office Visit (last PCP visit):   12/11/2023    Next Visit Date:  Future Appointments   Date Time Provider Department Center   11/22/2024  8:15 AM Omega Love MD [facility] DEP       **If hasn't been seen in over a year OR hasn't followed up according to last diabetes/ADHD visit, make appointment for patient before sending refill to provider.    Rx requested:  Requested Prescriptions     Pending Prescriptions Disp Refills    SITagliptin (JANUVIA) 50 MG tablet [Pharmacy Med Name: Januvia 50 MG Oral Tablet] 90 tablet 3     Sig: TAKE 1 TABLET BY MOUTH DAILY    valsartan (DIOVAN) 160 MG tablet [Pharmacy Med Name: Valsartan 160 MG Oral Tablet] 90 tablet 3     Sig: TAKE 1 TABLET BY MOUTH DAILY

## 2024-10-11 ENCOUNTER — HOSPITAL ENCOUNTER (OUTPATIENT)
Dept: CARDIOLOGY | Facility: HOSPITAL | Age: 79
Discharge: HOME | End: 2024-10-11
Payer: MEDICARE

## 2024-10-11 ENCOUNTER — APPOINTMENT (OUTPATIENT)
Dept: CARDIOLOGY | Facility: CLINIC | Age: 79
End: 2024-10-11
Payer: MEDICARE

## 2024-10-11 VITALS
BODY MASS INDEX: 24.91 KG/M2 | SYSTOLIC BLOOD PRESSURE: 134 MMHG | HEIGHT: 70 IN | WEIGHT: 174 LBS | HEART RATE: 80 BPM | DIASTOLIC BLOOD PRESSURE: 82 MMHG

## 2024-10-11 DIAGNOSIS — I25.10 CAD S/P PERCUTANEOUS CORONARY ANGIOPLASTY: ICD-10-CM

## 2024-10-11 DIAGNOSIS — Z95.0 PACEMAKER: ICD-10-CM

## 2024-10-11 DIAGNOSIS — K22.6 MALLORY-WEISS TEAR: ICD-10-CM

## 2024-10-11 DIAGNOSIS — R07.9 CHEST PAIN, UNSPECIFIED TYPE: ICD-10-CM

## 2024-10-11 DIAGNOSIS — I10 ESSENTIAL HYPERTENSION: ICD-10-CM

## 2024-10-11 DIAGNOSIS — Z87.891 FORMER SMOKER: ICD-10-CM

## 2024-10-11 DIAGNOSIS — I48.0 PAROXYSMAL ATRIAL FIBRILLATION (MULTI): ICD-10-CM

## 2024-10-11 DIAGNOSIS — E78.2 HYPERLIPIDEMIA, MIXED: ICD-10-CM

## 2024-10-11 DIAGNOSIS — Z71.89 ENCOUNTER FOR MEDICATION REVIEW AND COUNSELING: ICD-10-CM

## 2024-10-11 DIAGNOSIS — I45.10 RIGHT BUNDLE BRANCH BLOCK (RBBB): ICD-10-CM

## 2024-10-11 DIAGNOSIS — Z98.61 CAD S/P PERCUTANEOUS CORONARY ANGIOPLASTY: ICD-10-CM

## 2024-10-11 DIAGNOSIS — R00.2 PALPITATIONS: ICD-10-CM

## 2024-10-11 DIAGNOSIS — I44.2 COMPLETE HEART BLOCK: ICD-10-CM

## 2024-10-11 DIAGNOSIS — Z71.89 ENCOUNTER TO DISCUSS TREATMENT OPTIONS: ICD-10-CM

## 2024-10-11 PROCEDURE — 99214 OFFICE O/P EST MOD 30 MIN: CPT | Performed by: INTERNAL MEDICINE

## 2024-10-11 PROCEDURE — 93280 PM DEVICE PROGR EVAL DUAL: CPT

## 2024-10-11 PROCEDURE — 3075F SYST BP GE 130 - 139MM HG: CPT | Performed by: INTERNAL MEDICINE

## 2024-10-11 PROCEDURE — 3079F DIAST BP 80-89 MM HG: CPT | Performed by: INTERNAL MEDICINE

## 2024-10-11 PROCEDURE — 1159F MED LIST DOCD IN RCRD: CPT | Performed by: INTERNAL MEDICINE

## 2024-10-11 PROCEDURE — 1036F TOBACCO NON-USER: CPT | Performed by: INTERNAL MEDICINE

## 2024-10-11 PROCEDURE — 93000 ELECTROCARDIOGRAM COMPLETE: CPT | Mod: DISTINCT PROCEDURAL SERVICE | Performed by: INTERNAL MEDICINE

## 2024-10-11 ASSESSMENT — ENCOUNTER SYMPTOMS
SHORTNESS OF BREATH: 0
CARDIOVASCULAR NEGATIVE: 1
ORTHOPNEA: 0
IRREGULAR HEARTBEAT: 0
COUGH: 0
SYNCOPE: 0
SNORING: 0
NEAR-SYNCOPE: 0
CLAUDICATION: 0
PND: 0
WHEEZING: 0

## 2024-10-11 NOTE — PROGRESS NOTES
"Chief Complaint:   Follow-up (6mos)     History Of Present Illness:    Chino Owusu is a 78 y.o. male presenting with follow-up.     Patient denies any arrhythmia symptoms of palpitation, lightheadedness, near syncope, or syncope.    Last Recorded Vitals:  Vitals:    10/11/24 1011   BP: 134/82   BP Location: Left arm   Patient Position: Sitting   Pulse: 80   Weight: 78.9 kg (174 lb)   Height: 1.765 m (5' 9.5\")       Past Medical History:  See list  Past Surgical History:  See list    Social History:  He reports that he has quit smoking. His smoking use included cigarettes. He has never used smokeless tobacco. He reports that he does not currently use alcohol. He reports that he does not use drugs.    Family History:  Family History   Problem Relation Name Age of Onset    Other (arteriosclerotic cardio disease) Mother      Other (arteriosclerotic cardio disease) Father          Allergies:  Patient has no known allergies.    Outpatient Medications:  Current Outpatient Medications   Medication Instructions    aspirin 81 mg chewable tablet 1 tablet, oral, Daily    atorvastatin (Lipitor) 40 mg tablet 1 tablet, oral, Daily, As directed    clopidogrel (PLAVIX) 75 mg, oral, Daily    Januvia 50 mg tablet 1 tablet, oral, Daily    metFORMIN XR (GLUCOPHAGE-XR) 500 mg, oral, Every morning    metoprolol tartrate (LOPRESSOR) 50 mg, oral, Daily    nitroglycerin (NITROSTAT) 0.4 mg, sublingual, Every 5 min PRN, Up to 3 doses.    OneTouch Ultra Test strip Daily    pantoprazole (PROTONIX) 40 mg, oral, Daily    valsartan (Diovan) 160 mg tablet 1 tablet, oral, Daily   Review of Systems   Constitutional: Negative for malaise/fatigue.   Cardiovascular: Negative.  Negative for claudication, cyanosis, irregular heartbeat, leg swelling, near-syncope, orthopnea, paroxysmal nocturnal dyspnea and syncope.   Respiratory:  Negative for cough, shortness of breath, snoring and wheezing.    All other systems reviewed and are " negative.        Physical Exam:  Constitutional:       Appearance: Normal and healthy appearance. Well-developed and not in distress.      Comments: Left sided device well healed   Neck:      Vascular: No JVR. JVD normal.   Pulmonary:      Effort: Pulmonary effort is normal.      Breath sounds: Normal breath sounds. No wheezing. No rhonchi. No rales.   Chest:      Chest wall: Not tender to palpatation.   Cardiovascular:      PMI at left midclavicular line. Normal rate. Regular rhythm. Normal S1. Normal S2.       Murmurs: There is no murmur.      No gallop.  No click. No rub.   Pulses:     Intact distal pulses.   Edema:     Peripheral edema absent.   Abdominal:      Tenderness: There is no abdominal tenderness.   Musculoskeletal: Normal range of motion.         General: No tenderness. Skin:     General: Skin is warm and dry.   Neurological:      General: No focal deficit present.      Mental Status: Alert and oriented to person, place and time.            Last Labs:  CBC -  Lab Results   Component Value Date    WBC 9.2 05/30/2023    HGB 11.7 (L) 05/30/2023    HCT 35.3 (L) 05/30/2023    MCV 85 05/30/2023     05/30/2023       CMP -  Lab Results   Component Value Date    CALCIUM 8.4 (L) 05/30/2023    PHOS 3.1 05/30/2023    PROT 4.9 (L) 05/28/2023    ALBUMIN 3.1 (L) 05/30/2023    AST 45 (H) 05/28/2023    ALT 35 05/28/2023    ALKPHOS 72 05/28/2023    BILITOT 2.0 (H) 05/28/2023       LIPID PANEL -   Lab Results   Component Value Date    CHOL 128 10/07/2020    TRIG 143 10/07/2020    HDL 31.0 (A) 10/07/2020    CHHDL 4.1 10/07/2020    LDLF 68 10/07/2020    VLDL 29 10/07/2020       RENAL FUNCTION PANEL -   Lab Results   Component Value Date    GLUCOSE 132 (H) 05/30/2023     05/30/2023    K 3.6 05/30/2023     05/30/2023    CO2 24 05/30/2023    ANIONGAP 15 05/30/2023    BUN 11 05/30/2023    CREATININE 1.14 05/30/2023    GFRMALE 66 05/30/2023    CALCIUM 8.4 (L) 05/30/2023    PHOS 3.1 05/30/2023    ALBUMIN 3.1  (L) 05/30/2023        Lab Results   Component Value Date     (H) 08/08/2022    HGBA1C 7.3 (H) 12/11/2023       Last Cardiology Tests:  ECG:  ECG 12 lead daily 05/03/2024    Today.  Appropriate AV pacing.  Left axis deviation.  Paced QT interval 460 ms.    Device check today.  [employer] 2272 pacemaker.  Estimate longevity device over 6 years.  78% atrial pacing.  99% ventricular pacing.  Less than 1% atrial fibrillation.  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   Postoperatively underwent PCI drug-eluting stent to the mid LAD 2022.  Chronic.  Stable.  Asymptomatic.  Reviewed meds.  Continue meds.  Discussed refills tween Dr. Galvez and myself.  Second-degree AV block, Mobitz type II progressing to complete heart block status post dual-chamber pacemaker  [employer] dual-chamber pacemaker.  Reviewed device check.  Normal device function.   Discussed standard of care for follow-up in device.  Reviewed ECG.  Ordered device checks.  Will continue to alternate remote checks with device clinics.  Annual EP office visit with me.  Paroxysmal atrial fibrillation per device interrogation on beta-blockade and anticoagulated with Eliquis  High risk med Eliquis.  Continue for now.  He has had history of Katiuska-Marquez tear, upper GI bleed, and anemia.  S/p Watchman May 2024.  Left ventricular ejection fraction of 70 to 75% per 2D echocardiogram dated May 26, 2023.  Valvular heart disease consisting of moderate mitral annular calcification, mild MR and trace TR per 2D echocardiogram dated May 26, 2023.  Hyperlipidemia on statin.  Reviewed labs.  Hypertension, controlled with a blood pressure today of 154/84.  Remote tobacco use.  Diabetes mellitus.  History of upper GI bleed   Overweight     Greater than 50% of the visit performed.  The patient and I discussed arrhythmia, atrial fibrillation,  Watchman device, prior GI bleed, ECG, complete heart block, pacemaker dependence, standard of care for device follow-up, alternating visits between Dr. Galvez and myself for physician visits, treatment options, risk, benefits, and imponderables.  American Heart Association recommendations reviewed.  Behavioral medication reviewed.  Refills sent.  All questions answered.  Patient appreciative of care.

## 2024-10-11 NOTE — PATIENT INSTRUCTIONS
Follow up with Dr. Rivers in 1 year  We will change Dr. Galvez appointment to April so your appointments are staggered  Remote device checks will occur at 3 and 9 month intervals.  In clinic device checks are to be done every 6 months, on the same day of your appointment.  Continue same medications and treatments.   Patient educated on proper medication use.   Patient educated on risk factor modification.   Please bring any lab results from other providers / physicians to your next appointment.     Please bring all medicines, vitamins, and herbal supplements with you when you come to the office.     Prescriptions will not be filled unless you are compliant with your follow up appointments or have a follow up appointment scheduled as per instruction of your physician. Refills should be requested at the time of your visit.  I, RACHEL ROJAS LPN, AM SCRIBING FOR AND IN THE PRESENCE OF DR. FRANCESCA RIVERS MD, FACC, FACP, RS

## 2024-11-08 ENCOUNTER — TELEPHONE (OUTPATIENT)
Dept: CARDIOLOGY | Facility: CLINIC | Age: 79
End: 2024-11-08
Payer: MEDICARE

## 2024-11-08 NOTE — TELEPHONE ENCOUNTER
POS request from:  Dr. LEÓN Jefferson D.D.S.    Surgery date:  pending    Type of surgery: dental extraction    Facility:  Dr. Jefferson's office    Phone:  [phone]    Fax:  [phone]      Per Dr. Ravindra Galvez , Patient was  told to discontinue Plavix the beginning of November 2024.  Form completed and faxed to    [phone]    Fax confirmation received

## 2024-11-17 DIAGNOSIS — E11.9 TYPE 2 DIABETES MELLITUS WITHOUT COMPLICATION, UNSPECIFIED WHETHER LONG TERM INSULIN USE (HCC): ICD-10-CM

## 2024-11-18 RX ORDER — LANCETS 33 GAUGE
EACH MISCELLANEOUS
Qty: 300 EACH | Refills: 0 | Status: SHIPPED | OUTPATIENT
Start: 2024-11-18

## 2024-11-18 NOTE — TELEPHONE ENCOUNTER
Comments:     Last Office Visit (last PCP visit):   12/11/2023    Next Visit Date:  Future Appointments   Date Time Provider Department Center   11/22/2024  8:15 AM Omega Love MD [facility] DEP       **If hasn't been seen in over a year OR hasn't followed up according to last diabetes/ADHD visit, make appointment for patient before sending refill to provider.    Rx requested:  Requested Prescriptions     Pending Prescriptions Disp Refills    Lancets (ONETOUCH DELICA PLUS [id number]) MISC [Pharmacy Med Name: OneTouch Delica Plus [id number] Miscellaneous]  0     Sig: USE TWO TIMES DAILY AND AS NEEDED FOR SYMPTOMS OF IRREGULAR BLOOD GLUCOSE.

## 2024-11-22 ENCOUNTER — OFFICE VISIT (OUTPATIENT)
Dept: FAMILY MEDICINE CLINIC | Age: 79
End: 2024-11-22

## 2024-11-22 VITALS
HEART RATE: 79 BPM | HEIGHT: 68 IN | OXYGEN SATURATION: 98 % | TEMPERATURE: 97.6 F | SYSTOLIC BLOOD PRESSURE: 126 MMHG | WEIGHT: 168 LBS | DIASTOLIC BLOOD PRESSURE: 88 MMHG | BODY MASS INDEX: 25.46 KG/M2

## 2024-11-22 DIAGNOSIS — N18.32 STAGE 3B CHRONIC KIDNEY DISEASE (HCC): ICD-10-CM

## 2024-11-22 DIAGNOSIS — R97.20 ELEVATED PSA: ICD-10-CM

## 2024-11-22 DIAGNOSIS — E78.2 MIXED HYPERLIPIDEMIA: ICD-10-CM

## 2024-11-22 DIAGNOSIS — I10 ESSENTIAL HYPERTENSION: ICD-10-CM

## 2024-11-22 DIAGNOSIS — I20.9 ANGINA PECTORIS, UNSPECIFIED (HCC): ICD-10-CM

## 2024-11-22 DIAGNOSIS — I48.0 PAF (PAROXYSMAL ATRIAL FIBRILLATION) (HCC): ICD-10-CM

## 2024-11-22 DIAGNOSIS — I44.2 COMPLETE HEART BLOCK (HCC): ICD-10-CM

## 2024-11-22 DIAGNOSIS — N18.30 STAGE 3 CHRONIC KIDNEY DISEASE, UNSPECIFIED WHETHER STAGE 3A OR 3B CKD (HCC): ICD-10-CM

## 2024-11-22 DIAGNOSIS — E11.9 TYPE 2 DIABETES MELLITUS WITHOUT COMPLICATION, UNSPECIFIED WHETHER LONG TERM INSULIN USE (HCC): Primary | ICD-10-CM

## 2024-11-22 DIAGNOSIS — E11.65 TYPE 2 DIABETES MELLITUS WITH HYPERGLYCEMIA, WITHOUT LONG-TERM CURRENT USE OF INSULIN (HCC): ICD-10-CM

## 2024-11-22 DIAGNOSIS — E11.9 TYPE 2 DIABETES MELLITUS WITHOUT COMPLICATION, UNSPECIFIED WHETHER LONG TERM INSULIN USE (HCC): ICD-10-CM

## 2024-11-22 DIAGNOSIS — E11.22 TYPE 2 DIABETES MELLITUS WITH CHRONIC KIDNEY DISEASE, WITHOUT LONG-TERM CURRENT USE OF INSULIN, UNSPECIFIED CKD STAGE (HCC): ICD-10-CM

## 2024-11-22 LAB
ALBUMIN SERPL-MCNC: 4.3 G/DL (ref 3.5–4.6)
ALP SERPL-CCNC: 115 U/L (ref 35–104)
ALT SERPL-CCNC: 8 U/L (ref 0–41)
ANION GAP SERPL CALCULATED.3IONS-SCNC: 13 MEQ/L (ref 9–15)
AST SERPL-CCNC: 17 U/L (ref 0–40)
BILIRUB SERPL-MCNC: 0.9 MG/DL (ref 0.2–0.7)
BUN SERPL-MCNC: 15 MG/DL (ref 8–23)
CALCIUM SERPL-MCNC: 9.6 MG/DL (ref 8.5–9.9)
CHLORIDE SERPL-SCNC: 98 MEQ/L (ref 95–107)
CHOLEST SERPL-MCNC: 149 MG/DL (ref 0–199)
CO2 SERPL-SCNC: 25 MEQ/L (ref 20–31)
CREAT SERPL-MCNC: 1.49 MG/DL (ref 0.7–1.2)
CREAT UR-MCNC: 216.4 MG/DL
ERYTHROCYTE [DISTWIDTH] IN BLOOD BY AUTOMATED COUNT: 13.7 % (ref 11.5–14.5)
GLOBULIN SER CALC-MCNC: 3.3 G/DL (ref 2.3–3.5)
GLUCOSE SERPL-MCNC: 142 MG/DL (ref 70–99)
HCT VFR BLD AUTO: 41.3 % (ref 42–52)
HDLC SERPL-MCNC: 33 MG/DL (ref 40–59)
HGB BLD-MCNC: 14.3 G/DL (ref 14–18)
LDLC SERPL CALC-MCNC: 81 MG/DL (ref 0–129)
MCH RBC QN AUTO: 27.8 PG (ref 27–31.3)
MCHC RBC AUTO-ENTMCNC: 34.6 % (ref 33–37)
MCV RBC AUTO: 80.2 FL (ref 79–92.2)
MICROALBUMIN UR-MCNC: 48.4 MG/DL
MICROALBUMIN/CREAT UR-RTO: 223.7 MG/G (ref 0–30)
PLATELET # BLD AUTO: 327 K/UL (ref 130–400)
POTASSIUM SERPL-SCNC: 4.1 MEQ/L (ref 3.4–4.9)
PROT SERPL-MCNC: 7.6 G/DL (ref 6.3–8)
PSA SERPL-MCNC: 13.7 NG/ML (ref 0–4)
RBC # BLD AUTO: 5.15 M/UL (ref 4.7–6.1)
SODIUM SERPL-SCNC: 136 MEQ/L (ref 135–144)
TRIGL SERPL-MCNC: 177 MG/DL (ref 0–150)
WBC # BLD AUTO: 10.8 K/UL (ref 4.8–10.8)

## 2024-11-22 RX ORDER — AMOXICILLIN 500 MG/1
500 CAPSULE ORAL 2 TIMES DAILY
Qty: 20 CAPSULE | Refills: 2 | Status: SHIPPED | OUTPATIENT
Start: 2024-11-22

## 2024-11-22 NOTE — PROGRESS NOTES
Chief Complaint   Patient presents with    Diabetes     Check up, would like script for PCN to have on hand       HPI:  Albert Barker is a 79 y.o. male     CAD  Has pacemaker  [facility] Dr. Child and Dr. Moreno      Sugars much better   120-130    History elevated PSA      Hemoglobin A1C   Date Value Ref Range Status   12/11/2023 7.3 (H) 4.8 - 5.9 % Final         Patient Active Problem List   Diagnosis    Hypertension    Hyperlipemia    CAD (coronary artery disease)    MI (myocardial infarction) (HCC)    Diabetes mellitus (HCC)    Stage 3b chronic kidney disease (HCC)    Type 2 diabetes mellitus with hyperglycemia    Type 2 diabetes mellitus with chronic kidney disease    Chronic renal disease, stage III (HCC) [333222]    Complete heart block (HCC)    Angina pectoris, unspecified       Current Outpatient Medications   Medication Sig Dispense Refill    amoxicillin (AMOXIL) 500 MG capsule Take 1 capsule by mouth 2 times daily 20 capsule 2    Lancets (ONETOUCH DELICA PLUS [id number]) MISC USE TWO TIMES DAILY AND AS NEEDED FOR SYMPTOMS OF IRREGULAR BLOOD GLUCOSE. 300 each 0    SITagliptin (JANUVIA) 50 MG tablet TAKE 1 TABLET BY MOUTH DAILY 90 tablet 3    valsartan (DIOVAN) 160 MG tablet TAKE 1 TABLET BY MOUTH DAILY 90 tablet 3    atorvastatin (LIPITOR) 40 MG tablet TAKE 1 TABLET BY MOUTH DAILY 90 tablet 3    metFORMIN (GLUCOPHAGE-XR) 500 MG extended release tablet TAKE TWO TABLETS BY MOUTH DAILY WITH BREAKFAST 180 tablet 0    blood glucose test strips (ONETOUCH ULTRA) strip USE TO TEST ONCE DAILY 100 strip 3    metoprolol tartrate (LOPRESSOR) 50 MG tablet TAKE 1 TABLET BY MOUTH TWICE  DAILY 180 tablet 3    pantoprazole (PROTONIX) 40 MG tablet Take by mouth      aspirin (ASPIRIN CHILDRENS) 81 MG chewable tablet Take 1 tablet by mouth daily 30 tablet 3    NITROSTAT 0.4 MG SL tablet Place 1 tablet under the tongue every 5 minutes as needed       No current facility-administered medications for this visit.         Past

## 2024-11-23 LAB
ESTIMATED AVERAGE GLUCOSE: 154 MG/DL
HBA1C MFR BLD: 7 % (ref 4–6)

## 2024-11-29 RX ORDER — METFORMIN HYDROCHLORIDE 500 MG/1
1000 TABLET, EXTENDED RELEASE ORAL
Qty: 180 TABLET | Refills: 0 | Status: SHIPPED | OUTPATIENT
Start: 2024-11-29

## 2024-11-29 NOTE — TELEPHONE ENCOUNTER
Comments:     Last Office Visit (last PCP visit):   11/22/2024    Next Visit Date:  Future Appointments   Date Time Provider Department Center   11/24/2025  8:00 AM Omega Love MD [facility] E[facility] DEP       **If hasn't been seen in over a year OR hasn't followed up according to last diabetes/ADHD visit, make appointment for patient before sending refill to provider.    Rx requested:  Requested Prescriptions     Pending Prescriptions Disp Refills    metFORMIN (GLUCOPHAGE-XR) 500 MG extended release tablet [Pharmacy Med Name: metFORMIN HCl ER Oral Tablet Extended Release 24 Hour 500 MG] 180 tablet 0     Sig: TAKE TWO TABLETS BY MOUTH DAILY WITH BREAKFAST

## 2024-12-13 DIAGNOSIS — I25.10 CORONARY ARTERY DISEASE INVOLVING NATIVE HEART WITHOUT ANGINA PECTORIS, UNSPECIFIED VESSEL OR LESION TYPE: ICD-10-CM

## 2024-12-16 RX ORDER — METOPROLOL TARTRATE 50 MG
50 TABLET ORAL 2 TIMES DAILY
Qty: 180 TABLET | Refills: 3 | Status: SHIPPED | OUTPATIENT
Start: 2024-12-16

## 2024-12-16 NOTE — TELEPHONE ENCOUNTER
Comments:     Last Office Visit (last PCP visit):   11/22/2024    Next Visit Date:  Future Appointments   Date Time Provider Department Center   11/24/2025  8:00 AM Omega Love MD [facility] DEP       **If hasn't been seen in over a year OR hasn't followed up according to last diabetes/ADHD visit, make appointment for patient before sending refill to provider.    Rx requested:  Requested Prescriptions     Pending Prescriptions Disp Refills    metoprolol tartrate (LOPRESSOR) 50 MG tablet [Pharmacy Med Name: Metoprolol Tartrate 50 MG Oral Tablet] 180 tablet 3     Sig: TAKE 1 TABLET BY MOUTH TWICE  DAILY

## 2025-02-24 ENCOUNTER — HOSPITAL ENCOUNTER (OUTPATIENT)
Dept: CARDIOLOGY | Facility: HOSPITAL | Age: 80
Discharge: HOME | End: 2025-02-24
Payer: MEDICARE

## 2025-02-24 DIAGNOSIS — I44.2 COMPLETE HEART BLOCK: ICD-10-CM

## 2025-02-24 DIAGNOSIS — Z95.0 PACEMAKER: ICD-10-CM

## 2025-02-24 PROCEDURE — 93296 REM INTERROG EVL PM/IDS: CPT

## 2025-02-24 PROCEDURE — 93294 REM INTERROG EVL PM/LDLS PM: CPT | Performed by: INTERNAL MEDICINE

## 2025-02-24 RX ORDER — METFORMIN HYDROCHLORIDE 500 MG/1
1000 TABLET, EXTENDED RELEASE ORAL
Qty: 180 TABLET | Refills: 0 | Status: SHIPPED | OUTPATIENT
Start: 2025-02-24

## 2025-02-24 NOTE — TELEPHONE ENCOUNTER
Comments:     Last Office Visit (last PCP visit):   11/22/2024    Next Visit Date:  Future Appointments   Date Time Provider Department Center   11/24/2025  8:00 AM Omega Love MD [facility] DEP       **If hasn't been seen in over a year OR hasn't followed up according to last diabetes/ADHD visit, make appointment for patient before sending refill to provider.    Rx requested:  Requested Prescriptions     Pending Prescriptions Disp Refills    metFORMIN (GLUCOPHAGE-XR) 500 MG extended release tablet [Pharmacy Med Name: metFORMIN HCl ER Oral Tablet Extended Release 24 Hour 500 MG] 180 tablet 0     Sig: Take 2 tablets by mouth daily with breakfast.

## 2025-03-25 DIAGNOSIS — E11.9 TYPE 2 DIABETES MELLITUS WITHOUT COMPLICATION, UNSPECIFIED WHETHER LONG TERM INSULIN USE: ICD-10-CM

## 2025-03-26 RX ORDER — LANCETS 33 GAUGE
EACH MISCELLANEOUS
Qty: 300 EACH | Refills: 0 | Status: SHIPPED | OUTPATIENT
Start: 2025-03-26

## 2025-04-18 ENCOUNTER — APPOINTMENT (OUTPATIENT)
Dept: CARDIOLOGY | Facility: HOSPITAL | Age: 80
End: 2025-04-18
Payer: MEDICARE

## 2025-04-18 ENCOUNTER — APPOINTMENT (OUTPATIENT)
Dept: CARDIOLOGY | Facility: CLINIC | Age: 80
End: 2025-04-18
Payer: MEDICARE

## 2025-05-16 ENCOUNTER — APPOINTMENT (OUTPATIENT)
Dept: CARDIOLOGY | Facility: CLINIC | Age: 80
End: 2025-05-16
Payer: MEDICARE

## 2025-05-16 ENCOUNTER — HOSPITAL ENCOUNTER (OUTPATIENT)
Dept: CARDIOLOGY | Facility: HOSPITAL | Age: 80
Discharge: HOME | End: 2025-05-16
Payer: MEDICARE

## 2025-05-16 VITALS
HEART RATE: 72 BPM | SYSTOLIC BLOOD PRESSURE: 118 MMHG | HEIGHT: 68 IN | DIASTOLIC BLOOD PRESSURE: 78 MMHG | BODY MASS INDEX: 25.45 KG/M2 | WEIGHT: 167.9 LBS

## 2025-05-16 DIAGNOSIS — Z98.61 CAD S/P PERCUTANEOUS CORONARY ANGIOPLASTY: ICD-10-CM

## 2025-05-16 DIAGNOSIS — Z95.0 PACEMAKER: ICD-10-CM

## 2025-05-16 DIAGNOSIS — I10 ESSENTIAL HYPERTENSION: ICD-10-CM

## 2025-05-16 DIAGNOSIS — Z95.818 PRESENCE OF WATCHMAN LEFT ATRIAL APPENDAGE CLOSURE DEVICE: ICD-10-CM

## 2025-05-16 DIAGNOSIS — Z87.891 FORMER SMOKER: ICD-10-CM

## 2025-05-16 DIAGNOSIS — I44.2 COMPLETE HEART BLOCK: ICD-10-CM

## 2025-05-16 DIAGNOSIS — N18.30 STAGE 3 CHRONIC KIDNEY DISEASE, UNSPECIFIED WHETHER STAGE 3A OR 3B CKD (MULTI): ICD-10-CM

## 2025-05-16 DIAGNOSIS — I25.10 CAD S/P PERCUTANEOUS CORONARY ANGIOPLASTY: ICD-10-CM

## 2025-05-16 DIAGNOSIS — E78.2 HYPERLIPIDEMIA, MIXED: ICD-10-CM

## 2025-05-16 DIAGNOSIS — I48.0 PAROXYSMAL ATRIAL FIBRILLATION (MULTI): ICD-10-CM

## 2025-05-16 PROCEDURE — 3074F SYST BP LT 130 MM HG: CPT | Performed by: INTERNAL MEDICINE

## 2025-05-16 PROCEDURE — 99214 OFFICE O/P EST MOD 30 MIN: CPT | Performed by: INTERNAL MEDICINE

## 2025-05-16 PROCEDURE — 1036F TOBACCO NON-USER: CPT | Performed by: INTERNAL MEDICINE

## 2025-05-16 PROCEDURE — 3078F DIAST BP <80 MM HG: CPT | Performed by: INTERNAL MEDICINE

## 2025-05-16 PROCEDURE — 93280 PM DEVICE PROGR EVAL DUAL: CPT

## 2025-05-16 PROCEDURE — 1159F MED LIST DOCD IN RCRD: CPT | Performed by: INTERNAL MEDICINE

## 2025-05-16 NOTE — PROGRESS NOTES
CARDIOLOGY OFFICE VISIT      CHIEF COMPLAINT  Chief Complaint   Patient presents with    Follow-up     8 month follow-up for management of CAD, hypertension & hyperlipidemia.  He is here to discuss device check        HISTORY OF PRESENT ILLNESS    The patient states he has been doing very well.  He denies chest discomfort or symptoms to suggest myocardial ischemia.  He denies any problems with dyspnea with activities unless he overdoes things physically.  If that happens he just stops and rests and his symptoms go away in a short period of time.  He denies pretibial edema.  He denies palpitations and syncope.  He denies any problem with his current medication.  I did go over his lab work from 5 months ago which included a chemistry profile and lipid profile.  GFR 47.  Cholesterol 149, triglycerides 117, HDL 33, LDL 81.      IMPRESSION:   1. Coronary artery disease, no angina.  2. Remote multivessel percutaneous coronary intervention, most recent PCI GEMMA of LAD on 8/18/2022.  3. Remote non-ST-segment elevation myocardial infarction.  4. Essential hypertension.  5. Mixed hyperlipidemia.  6. Former smoker.  7. Overweight  8. Chronic kidney disease, stage III  9. Permanent pacemaker for complete heart block  10. Paroxysmal Atrial Fibrillation, s/p Watchman LAAO May 2024  11. Diabetes mellitus type 2       Please excuse any errors in grammar or translation related to this dictation. Voice recognition software was utilized to prepare this document.       Past Medical History  Medical History[1]    Social History  Social History[2]    Family History   Family History[3]     Allergies:  RX Allergies[4]     Outpatient Medications:  Current Outpatient Medications   Medication Instructions    aspirin 81 mg chewable tablet 1 tablet, Daily    atorvastatin (Lipitor) 40 mg tablet 1 tablet, Daily    clopidogrel (PLAVIX) 75 mg, oral, Daily    Januvia 50 mg tablet 1 tablet, Daily    metFORMIN XR (GLUCOPHAGE-XR) 500 mg, Every morning     metoprolol tartrate (LOPRESSOR) 50 mg, Daily    nitroglycerin (NITROSTAT) 0.4 mg, Every 5 min PRN    OneTouch Ultra Test strip Daily    pantoprazole (PROTONIX) 40 mg, oral, Daily    valsartan (Diovan) 160 mg tablet 1 tablet, Daily          REVIEW OF SYSTEMS  Review of Systems   All other systems reviewed and are negative.        VITALS  Vitals:    05/16/25 0953   BP: 118/78   Pulse: 72       PHYSICAL EXAM  Constitutional:       Appearance: Healthy appearance. Not in distress.   Neck:      Vascular: No JVR. JVD normal.   Pulmonary:      Effort: Pulmonary effort is normal.      Breath sounds: Normal breath sounds. No wheezing. No rhonchi. No rales.   Chest:      Chest wall: Not tender to palpatation.   Cardiovascular:      PMI at left midclavicular line. Normal rate. Regular rhythm. Normal S1. Normal S2.       Murmurs: There is no murmur.      No gallop.  No click. No rub.   Pulses:     Intact distal pulses.   Edema:     Peripheral edema absent.   Abdominal:      General: Bowel sounds are normal.      Palpations: Abdomen is soft.      Tenderness: There is no abdominal tenderness.   Musculoskeletal: Normal range of motion.         General: No tenderness. Skin:     General: Skin is warm and dry.   Neurological:      General: No focal deficit present.      Mental Status: Alert and oriented to person, place and time.           ASSESSMENT AND PLAN  Diagnoses and all orders for this visit:  CAD S/P percutaneous coronary angioplasty  Essential hypertension  Hyperlipidemia, mixed  Stage 3 chronic kidney disease, unspecified whether stage 3a or 3b CKD (Multi)  Presence of Watchman left atrial appendage closure device  Pacemaker  Paroxysmal atrial fibrillation (Multi)  Former smoker  BMI 25.0-25.9,adult      [unfilled]      I,Rosalind Mckee LPN am scribing for, and in the presence of Dr. Ravindra Galvez MD, FACC.    I, Dr. Ravindra Galvez MD, FACC, personally performed the services described  in the documentation as scribed by Rosalind Mckee LPN in my presence, and confirm it is both accurate and complete.      Dr. Ravindra Rodriguez MD  Thank you for allowing me to participate in the care of this patient. Please do not hesitate to contact me with any further questions or concerns.         [1]   Past Medical History:  Diagnosis Date    Overweight 09/02/2022    Overweight with body mass index (BMI) of 29 to 29.9 in adult   [2]   Social History  Tobacco Use    Smoking status: Former     Types: Cigarettes    Smokeless tobacco: Never   Substance Use Topics    Alcohol use: Not Currently    Drug use: Never   [3]   Family History  Problem Relation Name Age of Onset    Other (arteriosclerotic cardio disease) Mother      Other (arteriosclerotic cardio disease) Father     [4] No Known Allergies

## 2025-05-16 NOTE — PATIENT INSTRUCTIONS
Continue same medications and treatments.   Patient educated on proper medication use.   Patient educated on risk factor modification.   Please bring any lab results from other providers / physicians to your next appointment.     Please bring all medicines, vitamins, and herbal supplements with you when you come to the office.     Prescriptions will not be filled unless you are compliant with your follow up appointments or have a follow up appointment scheduled as per instruction of your physician. Refills should be requested at the time of your visit.    FOLLOW UP IN 1 YEAR    I, Rosalind Mckee LPN, am scribing for and in the presence of Dr. Ravindra Galvez MD, FACC

## 2025-05-22 DIAGNOSIS — E11.9 NEW ONSET TYPE 2 DIABETES MELLITUS (HCC): ICD-10-CM

## 2025-05-22 RX ORDER — BLOOD SUGAR DIAGNOSTIC
STRIP MISCELLANEOUS
Qty: 100 EACH | Refills: 5 | Status: SHIPPED | OUTPATIENT
Start: 2025-05-22

## 2025-05-22 NOTE — TELEPHONE ENCOUNTER
Comments:     Last Office Visit (last PCP visit):   11/22/2024    Next Visit Date:  Future Appointments   Date Time Provider Department Center   11/24/2025  8:00 AM Omega Love MD [facility] E[facility] DEP       **If hasn't been seen in over a year OR hasn't followed up according to last diabetes/ADHD visit, make appointment for patient before sending refill to provider.    Rx requested:  Requested Prescriptions     Pending Prescriptions Disp Refills    ONETOUCH ULTRA strip [Pharmacy Med Name: OneTouch Ultra In Vitro Strip]  0     Sig: use to test once daily

## 2025-05-29 RX ORDER — METFORMIN HYDROCHLORIDE 500 MG/1
1000 TABLET, EXTENDED RELEASE ORAL
Qty: 180 TABLET | Refills: 0 | Status: SHIPPED | OUTPATIENT
Start: 2025-05-29

## 2025-05-29 NOTE — TELEPHONE ENCOUNTER
Comments:     Last Office Visit (last PCP visit):   11/22/2024    Next Visit Date:  Future Appointments   Date Time Provider Department Center   11/24/2025  8:00 AM Omega Love MD [facility] DEP       **If hasn't been seen in over a year OR hasn't followed up according to last diabetes/ADHD visit, make appointment for patient before sending refill to provider.    Rx requested:  Requested Prescriptions     Pending Prescriptions Disp Refills    metFORMIN (GLUCOPHAGE-XR) 500 MG extended release tablet [Pharmacy Med Name: metFORMIN HCl ER Oral Tablet Extended Release 24 Hour 500 MG] 180 tablet 0     Sig: TAKE TWO TABLETS BY MOUTH DAILY WITH BREAKFAST

## 2025-07-11 RX ORDER — PANTOPRAZOLE SODIUM 40 MG/1
40 TABLET, DELAYED RELEASE ORAL DAILY
Qty: 30 TABLET | Refills: 5 | Status: SHIPPED | OUTPATIENT
Start: 2025-07-11 | End: 2027-01-04

## 2025-07-11 NOTE — TELEPHONE ENCOUNTER
Pt stopped in and needs a refill on his acid reflex medication but does not remember the name.    Pt uses the [facility]   Can this be sent today because pt is having trouble with GERD     Comments:      Last Office Visit (last PCP visit):   11/22/2024     Next Visit Date:    Future Appointments   Date Time Provider Department Center   11/24/2025  8:00 AM Omega Love MD [facility] DEP        **If hasn't been seen in over a year OR hasn't followed up according to last diabetes/ADHD visit, make appointment for patient before sending refill to provider.     Rx requested:    Requested Prescriptions      No prescriptions requested or ordered in this encounter

## 2025-07-24 ENCOUNTER — HOSPITAL ENCOUNTER (OUTPATIENT)
Dept: CARDIOLOGY | Facility: HOSPITAL | Age: 80
Discharge: HOME | End: 2025-07-24
Payer: MEDICARE

## 2025-07-24 DIAGNOSIS — Z95.0 CARDIAC PACEMAKER IN SITU: ICD-10-CM

## 2025-07-24 DIAGNOSIS — I44.2 ATRIOVENTRICULAR BLOCK, COMPLETE (MULTI): ICD-10-CM

## 2025-08-19 ENCOUNTER — HOSPITAL ENCOUNTER (OUTPATIENT)
Dept: CARDIOLOGY | Facility: HOSPITAL | Age: 80
Discharge: HOME | End: 2025-08-19
Payer: MEDICARE

## 2025-08-19 DIAGNOSIS — I44.2 ATRIOVENTRICULAR BLOCK, COMPLETE (MULTI): ICD-10-CM

## 2025-08-19 DIAGNOSIS — Z95.0 CARDIAC PACEMAKER IN SITU: ICD-10-CM

## 2025-08-19 PROCEDURE — 93296 REM INTERROG EVL PM/IDS: CPT

## 2025-08-21 DIAGNOSIS — E11.65 TYPE 2 DIABETES MELLITUS WITH HYPERGLYCEMIA, WITHOUT LONG-TERM CURRENT USE OF INSULIN (HCC): ICD-10-CM

## 2025-08-21 DIAGNOSIS — I10 ESSENTIAL HYPERTENSION: ICD-10-CM

## 2025-08-22 RX ORDER — SITAGLIPTIN 50 MG/1
50 TABLET, FILM COATED ORAL DAILY
Qty: 90 TABLET | Refills: 3 | Status: SHIPPED | OUTPATIENT
Start: 2025-08-22

## 2025-08-22 RX ORDER — VALSARTAN 160 MG/1
160 TABLET ORAL DAILY
Qty: 90 TABLET | Refills: 3 | Status: SHIPPED | OUTPATIENT
Start: 2025-08-22

## 2025-08-25 DIAGNOSIS — I25.10 CORONARY ARTERY DISEASE INVOLVING NATIVE HEART WITHOUT ANGINA PECTORIS, UNSPECIFIED VESSEL OR LESION TYPE: ICD-10-CM

## 2025-08-26 RX ORDER — ATORVASTATIN CALCIUM 40 MG/1
40 TABLET, FILM COATED ORAL DAILY
Qty: 90 TABLET | Refills: 3 | Status: SHIPPED | OUTPATIENT
Start: 2025-08-26

## 2025-08-29 RX ORDER — METFORMIN HYDROCHLORIDE 500 MG/1
1000 TABLET, EXTENDED RELEASE ORAL
Qty: 180 TABLET | Refills: 0 | Status: SHIPPED | OUTPATIENT
Start: 2025-08-29

## 2025-09-24 ENCOUNTER — APPOINTMENT (OUTPATIENT)
Dept: CARDIOLOGY | Facility: CLINIC | Age: 80
End: 2025-09-24
Payer: MEDICARE

## 2025-10-10 ENCOUNTER — APPOINTMENT (OUTPATIENT)
Dept: CARDIOLOGY | Facility: CLINIC | Age: 80
End: 2025-10-10
Payer: MEDICARE

## 2026-05-15 ENCOUNTER — APPOINTMENT (OUTPATIENT)
Dept: CARDIOLOGY | Facility: CLINIC | Age: 81
End: 2026-05-15
Payer: MEDICARE

## (undated) DEVICE — 6FR X 110CM, PIG 145 VENTRICULAR PIGTAIL CURVE, EXPO ANGIO CATH (EA)

## (undated) DEVICE — SHEATH, PINNACLE, 10 CM,  8FR INTRODUCER, 8FR DIA, 2.5 CM DIALATOR

## (undated) DEVICE — GUIDEWIRE, INQWIRE, 3MM J, .035, 150

## (undated) DEVICE — SHEATH, PINNACLE, W/O GUIDEWIRE, 8FR INTRODUCER,  8FR DIA, 2.5 CM DILATOR

## (undated) DEVICE — SHEATH, PINNACLE, 10 CM,  7FR INTRODUCER, 7FR DIA, 2.5 CM DIALATOR

## (undated) DEVICE — DEVICE, CLOSURE, PERCLOSE, PROSTYLE

## (undated) DEVICE — ELECTRODE, MULTIFUNCTION, QUICK-COMBO, EDGE SYSTEM, 2 FT

## (undated) DEVICE — SYSTEM, ACCESS, FXD DBL CURVE, WATCHMAN

## (undated) DEVICE — CLOSURE SYSTEM, VASCULAR, MVP 6-12FR, VENOUS

## (undated) DEVICE — ACCESS KIT, S-MAK MINI, 4FR 10CM 0.018IN 40CM, NT/PT, ECHO ENHANCE NEEDLE

## (undated) DEVICE — VERSACROSS KIT, ACCESS SOLUTION, RF WIRE 180CM

## (undated) DEVICE — CATHETER, ACUSON ACUNAV ULTRASOUND, 8FR 90CM